# Patient Record
Sex: FEMALE | Race: WHITE | NOT HISPANIC OR LATINO | Employment: OTHER | URBAN - METROPOLITAN AREA
[De-identification: names, ages, dates, MRNs, and addresses within clinical notes are randomized per-mention and may not be internally consistent; named-entity substitution may affect disease eponyms.]

---

## 2017-01-20 ENCOUNTER — ALLSCRIPTS OFFICE VISIT (OUTPATIENT)
Dept: OTHER | Facility: OTHER | Age: 67
End: 2017-01-20

## 2017-01-20 ENCOUNTER — LAB REQUISITION (OUTPATIENT)
Dept: LAB | Facility: HOSPITAL | Age: 67
End: 2017-01-20
Payer: COMMERCIAL

## 2017-01-20 DIAGNOSIS — Z01.419 ENCOUNTER FOR GYNECOLOGICAL EXAMINATION WITHOUT ABNORMAL FINDING: ICD-10-CM

## 2017-01-20 PROCEDURE — G0145 SCR C/V CYTO,THINLAYER,RESCR: HCPCS | Performed by: OBSTETRICS & GYNECOLOGY

## 2017-01-25 LAB
LAB AP GYN PRIMARY INTERPRETATION: NORMAL
Lab: NORMAL

## 2017-01-27 ENCOUNTER — HOSPITAL ENCOUNTER (OUTPATIENT)
Dept: MAMMOGRAPHY | Facility: MEDICAL CENTER | Age: 67
Discharge: HOME | End: 2017-01-27
Payer: COMMERCIAL

## 2017-01-27 DIAGNOSIS — Z12.31 ENCOUNTER FOR SCREENING MAMMOGRAM FOR MALIGNANT NEOPLASM OF BREAST: ICD-10-CM

## 2017-01-27 PROCEDURE — G0202 SCR MAMMO BI INCL CAD: HCPCS

## 2017-02-23 ENCOUNTER — ALLSCRIPTS OFFICE VISIT (OUTPATIENT)
Dept: OTHER | Facility: OTHER | Age: 67
End: 2017-02-23

## 2017-05-26 ENCOUNTER — GENERIC CONVERSION - ENCOUNTER (OUTPATIENT)
Dept: OTHER | Facility: OTHER | Age: 67
End: 2017-05-26

## 2017-06-05 ENCOUNTER — GENERIC CONVERSION - ENCOUNTER (OUTPATIENT)
Dept: OTHER | Facility: OTHER | Age: 67
End: 2017-06-05

## 2017-07-24 ENCOUNTER — GENERIC CONVERSION - ENCOUNTER (OUTPATIENT)
Dept: OTHER | Facility: OTHER | Age: 67
End: 2017-07-24

## 2017-08-23 ENCOUNTER — ALLSCRIPTS OFFICE VISIT (OUTPATIENT)
Dept: OTHER | Facility: OTHER | Age: 67
End: 2017-08-23

## 2017-08-28 ENCOUNTER — GENERIC CONVERSION - ENCOUNTER (OUTPATIENT)
Dept: OTHER | Facility: OTHER | Age: 67
End: 2017-08-28

## 2017-11-14 DIAGNOSIS — E78.5 HYPERLIPIDEMIA: ICD-10-CM

## 2017-11-14 DIAGNOSIS — R41.3 OTHER AMNESIA: ICD-10-CM

## 2017-11-14 DIAGNOSIS — R53.82 CHRONIC FATIGUE: ICD-10-CM

## 2017-11-14 DIAGNOSIS — M79.7 FIBROMYALGIA: ICD-10-CM

## 2017-11-14 DIAGNOSIS — N18.30 CHRONIC KIDNEY DISEASE, STAGE III (MODERATE) (HCC): ICD-10-CM

## 2017-11-14 DIAGNOSIS — M81.0 AGE-RELATED OSTEOPOROSIS WITHOUT CURRENT PATHOLOGICAL FRACTURE: ICD-10-CM

## 2017-11-14 DIAGNOSIS — G31.84 MILD COGNITIVE IMPAIRMENT: ICD-10-CM

## 2017-11-29 ENCOUNTER — LAB CONVERSION - ENCOUNTER (OUTPATIENT)
Dept: OTHER | Facility: OTHER | Age: 67
End: 2017-11-29

## 2017-11-29 LAB
MISCELLANEOUS LAB TEST RESULT (HISTORICAL): 0.1
TEST NAME (HISTORICAL): NORMAL

## 2017-12-18 ENCOUNTER — GENERIC CONVERSION - ENCOUNTER (OUTPATIENT)
Dept: FAMILY MEDICINE CLINIC | Facility: CLINIC | Age: 67
End: 2017-12-18

## 2017-12-18 ENCOUNTER — LAB CONVERSION - ENCOUNTER (OUTPATIENT)
Dept: OTHER | Facility: OTHER | Age: 67
End: 2017-12-18

## 2017-12-18 LAB
25(OH)D3 SERPL-MCNC: 28 NG/ML (ref 30–100)
A/G RATIO (HISTORICAL): 1.5 (CALC) (ref 1–2.5)
ALBUMIN SERPL BCP-MCNC: 4.2 G/DL (ref 3.6–5.1)
ALP SERPL-CCNC: 65 U/L (ref 33–130)
ALT SERPL W P-5'-P-CCNC: 14 U/L (ref 6–29)
AST SERPL W P-5'-P-CCNC: 24 U/L (ref 10–35)
BILIRUB SERPL-MCNC: 0.4 MG/DL (ref 0.2–1.2)
BUN SERPL-MCNC: 14 MG/DL (ref 7–25)
BUN/CREA RATIO (HISTORICAL): 12 (CALC) (ref 6–22)
CALCIUM SERPL-MCNC: 9.4 MG/DL (ref 8.6–10.4)
CHLORIDE SERPL-SCNC: 103 MMOL/L (ref 98–110)
CHOLEST SERPL-MCNC: 292 MG/DL
CHOLEST/HDLC SERPL: 3.8 (CALC)
CO2 SERPL-SCNC: 29 MMOL/L (ref 20–31)
CREAT SERPL-MCNC: 1.13 MG/DL (ref 0.5–0.99)
EGFR AFRICAN AMERICAN (HISTORICAL): 58 ML/MIN/1.73M2
EGFR-AMERICAN CALC (HISTORICAL): 50 ML/MIN/1.73M2
ERYTHROCYTE SEDIMENTATION RATE (HISTORICAL): 38 MM/H
GAMMA GLOBULIN (HISTORICAL): 2.8 G/DL (CALC) (ref 1.9–3.7)
GLUCOSE (HISTORICAL): 84 MG/DL (ref 65–99)
HDLC SERPL-MCNC: 76 MG/DL
LDL CHOLESTEROL (HISTORICAL): 183 MG/DL (CALC)
NON-HDL-CHOL (CHOL-HDL) (HISTORICAL): 216 MG/DL (CALC)
POTASSIUM SERPL-SCNC: 4.1 MMOL/L (ref 3.5–5.3)
SODIUM SERPL-SCNC: 141 MMOL/L (ref 135–146)
TOTAL PROTEIN (HISTORICAL): 7 G/DL (ref 6.1–8.1)
TRIGL SERPL-MCNC: 178 MG/DL
TSH SERPL DL<=0.05 MIU/L-ACNC: 4.88 MIU/L (ref 0.4–4.5)
VIT B12 SERPL-MCNC: 948 PG/ML (ref 200–1100)

## 2017-12-20 ENCOUNTER — GENERIC CONVERSION - ENCOUNTER (OUTPATIENT)
Dept: FAMILY MEDICINE CLINIC | Facility: CLINIC | Age: 67
End: 2017-12-20

## 2018-01-04 ENCOUNTER — TRANSCRIBE ORDERS (OUTPATIENT)
Dept: ADMINISTRATIVE | Facility: HOSPITAL | Age: 68
End: 2018-01-04

## 2018-01-04 DIAGNOSIS — M76.30 ILIOTIBIAL BAND SYNDROME: ICD-10-CM

## 2018-01-04 DIAGNOSIS — Z12.31 ENCOUNTER FOR SCREENING MAMMOGRAM FOR MALIGNANT NEOPLASM OF BREAST: ICD-10-CM

## 2018-01-04 DIAGNOSIS — Z12.31 VISIT FOR SCREENING MAMMOGRAM: Primary | ICD-10-CM

## 2018-01-09 NOTE — PROGRESS NOTES
Chief Complaint  Pt here today for Prolia injection, administered SQ right arm  patient also mentioned her horse bit her in the right forearm, revealed a small open area with bruising  After speaking with Dr Peri Calvertie was also administered IM to the left deltoid  Active Problems    1  Acute gastritis (535 00) (K29 00)   2  Chronic fatigue (780 79) (R53 82)   3  Chronic kidney disease, stage III (moderate) (585 3) (N18 3)   4  Depression (311) (F32 9)   5  Elevated troponin level (790 6) (R79 89)   6  Encounter for cervical Pap smear with pelvic exam (V76 2,V72 31) (Z01 419)   7  Encounter for routine gynecological examination with Papanicolaou smear of cervix   (V72 31,V76 2) (Z01 419)   8  Encounter for screening colonoscopy (V76 51) (Z12 11)   9  Encounter for screening mammogram for malignant neoplasm of breast (V76 12)   (Z12 31)   10  Fibromyalgia (729 1) (M79 7)   11  Generalized anxiety disorder (300 02) (F41 1)   12  Hyperlipidemia (272 4) (E78 5)   13  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)   14  Left otitis externa (380 10) (H60 92)   15  Neoplasm of skin, benign (216 9) (D23 9)   16  Non-cardiac chest pain (786 59) (R07 89)   17  Osteopenia (733 90) (M85 80)   18  Osteoporosis (733 00) (M81 0)   19  Renal function test abnormal (794 4) (R94 4)   20  Somatic dysfunction of cervical region (739 1) (M99 01)   21  Somatic dysfunction of lumbar region (739 3) (M99 03)   22  Somatic dysfunction of thoracic region (739 2) (M99 02)   23  Transient global amnesia (437 7) (G45 4)   24  Unsatisfactory cervical Papanicolaou smear (795 08) (R87 615)    Current Meds   1  CholestOff 450 MG Oral Tablet; TAKE AS DIRECTED PER PACKAGE INSTRUCTIONS; Therapy: 66EBJ1660 to (Last Rx:35Ujs0869) Ordered   2  Fish Oil 1000 MG Oral Capsule; Therapy: 74PPP4815 to Recorded   3  Glucosamine Chondroitin Plus Oral Capsule; Therapy: 88JXU4051 to Recorded   4  Green Tea 150 MG Oral Capsule;    Therapy: 51XJH1163 to Recorded   5  LORazepam 1 MG Oral Tablet; TAKE ONE TABLET DAILY AS DIRECTED AND   NEEDED; Last VA:70NLB9735 Ordered   6  Multi Complete Oral Capsule; Therapy: 48LZD8941 to Recorded   7  Prolia 60 MG/ML Subcutaneous Solution; INJECT SUBCUTANEOUSLY  60 MG / 1 ML   EVERY 6 MONTHS; Therapy: 53QZJ5225 to (Last Rx:69Fpr0348) Ordered   8  RaNITidine HCl - 150 MG Oral Tablet; take one tablet by mouth twice daily; Therapy: 20Sgz0560 to (Evaluate:12Jun2015)  Requested for: 04Klb5033; Last   Rx:66Wkz3613 Ordered   9  Venlafaxine HCl ER 75 MG Oral Capsule Extended Release 24 Hour; TAKE 1 CAPSULE   BY MOUTH 3 TIMES A DAY; Therapy: 05KKD6180 to (Evaluate:04Maz2786)  Requested for: 83Bqy3063; Last   Rx:63Usq6585 Ordered    Allergies    1   No Known Drug Allergies    Plan  Bite wound of forearm, right, initial encounter    · Adacel 5-2-15 5 LF-MCG/0 5 Intramuscular Suspension  Osteoporosis    · Prolia 60 MG/ML Subcutaneous Solution    Future Appointments    Date/Time Provider Specialty Site   01/25/2018 02:20 PM Memo Sesay DO [facility]     Signatures   Electronically signed by : Jaqueline Mitchell DO; Feb 23 2088  5:15PM EST                       (Author)

## 2018-01-10 NOTE — PROGRESS NOTES
Assessment    1  Encounter for preventive health examination (V70 0) (Z00 00)   2  Osteopenia (733 90) (M85 80)    Plan  Encounter for cervical Pap smear with pelvic exam    · Follow-up visit in 1 year Evaluation and Treatment  Follow-up  Status: Hold For -  Scheduling  Requested for: 97QTK0538   Ordered; For: Encounter for cervical Pap smear with pelvic exam; Ordered By: Marshall Mccauley Performed:  Due: 58CPK6475  Encounter for screening mammogram for malignant neoplasm of breast    · Digital Bilateral Screening Mammogram With CAD; Status:Active; Requested  for:94Twn1022;    Perform:[facility]; AIA:21KTX7594; Last Updated By:Kailee Mcrae; 1/13/2016 5:05:08 PM;Ordered;  For:Encounter for screening mammogram for malignant neoplasm of breast; Ordered By:Christina Long;  Osteoporosis    · * DXA BONE DENSITY SPINE HIP AND PELVIS; Status:Hold For -  Scheduling,Retrospective By Protocol Authorization; Requested YQF:[id number];    Perform:[facility]; SWC:74VYO0068; Last Updated By:Kailee Mcrae; 1/15/2016 2:46:52 PM;Ordered;  For:Osteoporosis; Ordered By:Max Gonsales;    Discussion/Summary    Discussed potential gyn emergencies;will heed pap, mammo and DEXA; Rxs given; seen with p a  student, [address] [place], [place] [address]  Chief Complaint  The patient presents for her routine exam       Review of Systems    Constitutional: No fever, no chills, feels well, no tiredness, no recent weight gain or loss  ENT: no ear ache, no loss of hearing, no nosebleeds or nasal discharge, no sore throat or hoarseness  Cardiovascular: no complaints of slow or fast heart rate, no chest pain, no palpitations, no leg claudication or lower extremity edema  Respiratory: no complaints of shortness of breath, no wheezing, no dyspnea on exertion, no orthopnea or PND  Breasts: no complaints of breast pain, breast lump or nipple discharge     Gastrointestinal: no complaints of abdominal pain, no constipation, no nausea or diarrhea, no vomiting, no bloody stools  Genitourinary: no complaints of dysuria, no incontinence, no pelvic pain, no dysmenorrhea, no vaginal discharge or abnormal vaginal bleeding  Musculoskeletal: no complaints of arthralgia, no myalgia, no joint swelling or stiffness, no limb pain or swelling  Integumentary: no complaints of skin rash or lesion, no itching or dry skin, no skin wounds  Neurological: no complaints of headache, no confusion, no numbness or tingling, no dizziness or fainting  Active Problems    1  Acute gastritis (535 00) (K29 00)   2  Depression (311) (F32 9)   3  Elevated troponin level (790 6) (R79 89)   4  Encounter for screening colonoscopy (V76 51) (Z12 11)   5  Encounter for screening mammogram for malignant neoplasm of breast (V76 12)   (Z12 31)   6  Fibromyalgia (729 1) (M79 7)   7  Generalized anxiety disorder (300 02) (F41 1)   8  Hyperlipidemia (272 4) (E78 5)   9  Left otitis externa (380 10) (H60 92)   10  Neoplasm of skin, benign (216 9) (D23 9)   11  Non-cardiac chest pain (786 59) (R07 89)   12  Osteopenia (733 90) (M85 80)   13  Osteoporosis (733 00) (M81 0)   14  Somatic dysfunction of cervical region (739 1) (M99 01)   15  Somatic dysfunction of lumbar region (739 3) (M99 03)   16  Somatic dysfunction of thoracic region (739 2) (M99 02)   17  Transient global amnesia (437 7) (G45 4)   18   Unsatisfactory cervical Papanicolaou smear (795 08) (R87 615)    Past Medical History    · History of A Fall (E888 9)   · History of Abdominal pain, epigastric (789 06) (R10 13)   · History of Encounter for routine gynecological examination (V72 31) (Z01 419)   · History of Foot Pain (Soft Tissue) (729 5)   · History of Hip pain, unspecified laterality   · History of acute gastritis (V12 70) (Z87 19)   · History of Impacted cerumen of left ear (380 4) (H61 22)   · History of Malignant neoplasm without specification of site (199 1) (C80 1)   · History of Need for pneumococcal vaccination (V03 82) (Z23)   · History of Pain in hand (729 5) (M79 643)   · History of Routine Gynecological Exam With Cervical Pap Smear (V72 31)   · History of Thrombophlebitis Of Deep Vessels Of The Lower Extremity (V12 51)    Surgical History    · History of Appendiceal Laparoscopy   · History of Biopsy Oropharynx Tonsillar Fossa   · History of Chemotherapy Administration (V58 11)   · History of Chemotherapy Administration (V58 11)   · History of Diagnostic Cystoscopy   · History of Percutaneous Gastrojejunostomy   · History of Percutaneous Gastrojejunostomy   · History of Reconstruction Of Inferior Vena Cava    Family History    · Family history of Malignant neoplasm of breast, unspecified laterality    · Family history of Bone Cancer (170 9)   · Family history of Malignant neoplasm of breast, unspecified laterality    Social History    · Currently working   · Former smoker (R99 94) (X32 372)   · Quit 2004   · Single    Current Meds   1  Alpha-Lipoic Acid 200 MG Oral Capsule; Therapy: 38HCF0971 to Recorded   2  CholestOff 450 MG Oral Tablet; TAKE AS DIRECTED PER PACKAGE INSTRUCTIONS; Therapy: 68ASI1949 to (Last Rx:57Tsv3648) Ordered   3  Fish Oil 1000 MG Oral Capsule; Therapy: 90LAP6395 to Recorded   4  Glucosamine Chondroitin Plus Oral Capsule; Therapy: 38ZPZ6591 to Recorded   5  Green Tea 150 MG Oral Capsule; Therapy: 45URP4128 to Recorded   6  LORazepam 1 MG Oral Tablet; TAKE ONE TABLET DAILY AS DIRECTED AND   NEEDED; Last Rx:97Syc8612 Ordered   7  Multi Complete Oral Capsule; Therapy: 81ZJV1516 to Recorded   8  Prolia 60 MG/ML Subcutaneous Solution; INJECT SUBCUTANEOUSLY  60 MG / 1 ML   EVERY 6 MONTHS; Therapy: 91VTM5946 to (Last Rx:01Soc9791) Ordered   9  Ranitidine HCl - 150 MG Oral Tablet; take one tablet by mouth twice daily; Therapy: 02Nme8374 to (Evaluate:12Jun2015)  Requested for: 13Apr2015; Last   Rx:13Apr2015 Ordered   10   Venlafaxine HCl ER 37 5 MG Oral Capsule Extended Release 24 Hour; TAKE THREE    CAPSULES IN [facility] AND THREE CAPSULES IN THE EVENING; Therapy: 57EUN3429 to (Ane Resides)  Requested for: 63JMU7031; Last    Rx:73Brf1001 Ordered    Allergies    1  No Known Drug Allergies    Vitals   Recorded: 70TEN7627 73:74ZM   Systolic 548   Diastolic 80   Height 5 ft 3 8 in   Weight 141 lb    BMI Calculated 24 35   BSA Calculated 1 68   LMP 1997     Physical Exam    Constitutional   General appearance: No acute distress, well appearing and well nourished  Neck   Neck: Normal, supple, trachea midline, no masses  Thyroid: Normal, no thyromegaly  Pulmonary   Respiratory effort: No increased work of breathing or signs of respiratory distress  Auscultation of lungs: Clear to auscultation  Cardiovascular   Auscultation of heart: Normal rate and rhythm, normal S1 and S2, no murmurs  Peripheral vascular exam: Normal pulses Throughout  Genitourinary   External genitalia: Normal and no lesions appreciated  Vagina: Normal, no lesions or dryness appreciated  Urethra: Normal     Urethral meatus: Normal     Bladder: Normal, soft, non-tender and no prolapse or masses appreciated  Cervix: Normal, no palpable masses  ec and c pap done  Uterus: Normal, non-tender, not enlarged, and no palpable masses  Adnexa/parametria: Normal, non-tender and no fullness or masses appreciated  Anus, perineum, and rectum: Normal sphincter tone, no masses, and no prolapse  Chest   Breasts: Normal and no dimpling or skin changes noted  Abdomen   Abdomen: Normal, non-tender, and no organomegaly noted  Liver and spleen: No hepatomegaly or splenomegaly  Examination for hernias: No hernias appreciated  Stool sample for occult blood: Negative  Lymphatic   Palpation of lymph nodes in neck, axillae, groin and/or other locations: No lymphadenopathy or masses noted  Skin   Skin and subcutaneous tissue: Normal skin turgor and no rashes      Palpation of skin and subcutaneous tissue: Normal     Psychiatric   Orientation to person, place, and time: Normal     Mood and affect: Normal        Signatures   Electronically signed by : Kesah Dey DO; Serjio 15 2016  3:01PM EST                       (Author)

## 2018-01-12 ENCOUNTER — ALLSCRIPTS OFFICE VISIT (OUTPATIENT)
Dept: OTHER | Facility: OTHER | Age: 68
End: 2018-01-12

## 2018-01-12 NOTE — PROGRESS NOTES
Chief Complaint  Pt is here for a prolia inj  Active Problems    1  Acute gastritis (535 00) (K29 00)   2  Chronic fatigue (780 79) (R53 82)   3  Depression (311) (F32 9)   4  Elevated troponin level (790 6) (R79 89)   5  Encounter for cervical Pap smear with pelvic exam (V76 2,V72 31) (Z01 419)   6  Encounter for routine gynecological examination with Papanicolaou smear of cervix   (V72 31,V76 2) (Z01 419)   7  Encounter for screening colonoscopy (V76 51) (Z12 11)   8  Encounter for screening mammogram for malignant neoplasm of breast (V76 12)   (Z12 31)   9  Fibromyalgia (729 1) (M79 7)   10  Generalized anxiety disorder (300 02) (F41 1)   11  Hyperlipidemia (272 4) (E78 5)   12  Left otitis externa (380 10) (H60 92)   13  Neoplasm of skin, benign (216 9) (D23 9)   14  Non-cardiac chest pain (786 59) (R07 89)   15  Osteopenia (733 90) (M85 80)   16  Osteoporosis (733 00) (M81 0)   17  Somatic dysfunction of cervical region (739 1) (M99 01)   18  Somatic dysfunction of lumbar region (739 3) (M99 03)   19  Somatic dysfunction of thoracic region (739 2) (M99 02)   20  Transient global amnesia (437 7) (G45 4)   21  Unsatisfactory cervical Papanicolaou smear (795 08) (R87 615)    Current Meds   1  Alpha-Lipoic Acid 200 MG Oral Capsule; Therapy: 60CRK8609 to Recorded   2  CholestOff 450 MG Oral Tablet; TAKE AS DIRECTED PER PACKAGE INSTRUCTIONS; Therapy: 79MEW3260 to (Last Rx:48Fej1140) Ordered   3  Fish Oil 1000 MG Oral Capsule; Therapy: 18LAU0174 to Recorded   4  Glucosamine Chondroitin Plus Oral Capsule; Therapy: 16ATW5745 to Recorded   5  Green Tea 150 MG Oral Capsule; Therapy: 63RGO9246 to Recorded   6  LORazepam 1 MG Oral Tablet; TAKE ONE TABLET DAILY AS DIRECTED AND   NEEDED; Last Rx:30Jun2016 Ordered   7  Multi Complete Oral Capsule; Therapy: 38RED3559 to Recorded   8  Prolia 60 MG/ML Subcutaneous Solution; INJECT SUBCUTANEOUSLY  60 MG / 1 ML   EVERY 6 MONTHS;    Therapy: 63BXA0666 to (Last Rx: 15ZHR2092) Ordered   9  Ranitidine HCl - 150 MG Oral Tablet; take one tablet by mouth twice daily; Therapy: 61Tuc6407 to (Evaluate:12Jun2015)  Requested for: 13Apr2015; Last   Rx:99Hhq6396 Ordered   10  Venlafaxine HCl ER 75 MG Oral Capsule Extended Release 24 Hour; TAKE 1 CAPSULE    3 times daily; Therapy: 74DAP2501 to (Evaluate:27Oct2016)  Requested for: 31Xgd1753; Last    Rx:53Utb1570 Ordered    Allergies    1  No Known Drug Allergies    Assessment    1  Chronic fatigue (780 79) (R53 82)    Plan  Acute gastritis, Chronic fatigue    · (1) ALLERGY, FOOD PANEL; Status:Active;  Requested for:07Xgg2062;     Future Appointments    Date/Time Provider Specialty Site   01/20/2017 03:00 PM Marshall Mccauley DO Obstetrics/Gynecology [place] [facility] [place]     Signatures   Electronically signed by : Jeffrey Tirado DO; Aug  2 7322  1:21PM EST                       (Author)

## 2018-01-13 NOTE — PROGRESS NOTES
Assessment   1  Hyperlipidemia (272 4) (E78 5)   2  Hypothyroidism, adult (244 9) (E03 9)   3  Vitamin D deficiency, unspecified (268 9) (E55 9)    Plan   Hypothyroidism, adult    · (1) FREE T3; Status:Active; Requested FTD:44CIJ0717;    · (1) T4, FREE; Status:Active; Requested OJW:17PRC8146;    · (1) TSH; Status:Active; Requested NAM:33OIY2979;     Chief Complaint   Pt here for review blood work  Active Problems   1  Chronic fatigue (780 79) (R53 82)   2  Depression (311) (F32 9)   3  Encounter for routine gynecological examination with Papanicolaou smear of cervix     (V72 31,V76 2) (Z01 419)   4  Encounter for screening colonoscopy (V76 51) (Z12 11)   5  Encounter for screening mammogram for malignant neoplasm of breast (V76 12)     (Z12 31)   6  Fibromyalgia (729 1) (M79 7)   7  Generalized anxiety disorder (300 02) (F41 1)   8  Hyperlipidemia (272 4) (E78 5)   9  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)   10  Mild cognitive impairment (331 83) (G31 84)   11  Osteoporosis (733 00) (M81 0)    Past Medical History   1  History of A Fall (E888 9)   2  History of Abdominal pain, epigastric (789 06) (R10 13)   3  History of Encounter for routine gynecological examination (V72 31) (Z01 419)   4  History of Foot Pain (Soft Tissue) (729 5)   5  History of Hip pain, unspecified laterality   6  History of acute gastritis (V12 70) (Z87 19)   7  History of Impacted cerumen of left ear (380 4) (H61 22)   8  History of Malignant neoplasm without specification of site (199 1) (C80 1)   9  History of Need for pneumococcal vaccination (V03 82) (Z23)   10  History of Pain in hand (729 5) (M79 643)   11  History of Routine Gynecological Exam With Cervical Pap Smear (V72 31)   12  History of Thrombophlebitis Of Deep Vessels Of The Lower Extremity (V12 51)     The active problems and past medical history were reviewed and updated today  Surgical History   1  History of Appendiceal Laparoscopy   2   History of Biopsy Oropharynx Tonsillar Fossa   3  History of Chemotherapy Administration (V58 11)   4  History of Chemotherapy Administration (V58 11)   5  History of Diagnostic Cystoscopy   6  History of Percutaneous Gastrojejunostomy   7  History of Percutaneous Gastrojejunostomy   8  History of Reconstruction Of Inferior Vena Cava     The surgical history was reviewed and updated today  Family History   Mother    1  Family history of Malignant neoplasm of breast, unspecified laterality  Family History    2  Family history of Bone Cancer (170 9)   3  Family history of Malignant neoplasm of breast, unspecified laterality     The family history was reviewed and updated today  Social History    · Currently working   · Former smoker (P32 99) (Z91 200)   · Single  The social history was reviewed and updated today  Current Meds    1  Fish Oil 1000 MG Oral Capsule; Therapy: 29NTE0544 to Recorded   2  Glucosamine Chondroitin Plus Oral Capsule; Therapy: 12QUH3031 to Recorded   3  Green Tea 150 MG Oral Capsule; Therapy: 88ZOS4877 to Recorded   4  LORazepam 1 MG Oral Tablet; TAKE ONE TABLET DAILY AS DIRECTED AND NEEDED;     Last Rx:42Mmh5266 Ordered   5  Multi Complete Oral Capsule; Therapy: 58OCG7508 to Recorded   6  Prolia 60 MG/ML Subcutaneous Solution; INJECT SUBCUTANEOUSLY  60 MG / 1 ML     EVERY 6 MONTHS; Therapy: 54VFJ8860 to (Last Rx:54Skq7246) Ordered   7  Venlafaxine HCl ER 75 MG Oral Capsule Extended Release 24 Hour; TAKE 1 CAPSULE     BY MOUTH 3 TIMES A DAY; Therapy: 04JBG9987 to (Miah Rideau)  Requested for: 27Vot6065; Last     Rx:69Mqr2461 Ordered     The medication list was reviewed and updated today  Allergies   1   No Known Drug Allergies    Vitals   Vital Signs    Recorded: 12Jan2018 03:51PM Recorded: 13CER6604 07:63GQ   Systolic 673    Diastolic 72    Height  5 ft 3 8 in   Weight  154 lb 2 oz   BMI Calculated  26 62   BSA Calculated  1 75     Results/Data   COLONOSCOPY 65TQQ0002 12: 24QB [place]      Test Name Result Flag Reference   Colonoscopy 12/17/2017        (1) LIPID PANEL, FASTING 93OWM9173 56:80WN [place]      Test Name Result Flag Reference   CHOLESTEROL, TOTAL 292 mg/dL H <200   HDL CHOLESTEROL 76 mg/dL  >33   TRIGLICERIDES 862 mg/dL H <150   LDL-CHOLESTEROL 183 mg/dL (calc) H    Reference range: <100           Desirable range <100 mg/dL for patients with CHD or     diabetes and <70 mg/dL for diabetic patients with     known heart disease  LDL-C is now calculated using the Toni-Maynard      calculation, which is a validated novel method providing      better accuracy than the Friedewald equation in the      estimation of LDL-C  Danny Milton  Peri Guerrero  2300;551(83): 6514-3996      (http://EverCloud/faq/XUT880)   CHOL/HDLC RATIO 3 8 (calc)  <5 0   NON HDL CHOLESTEROL 216 mg/dL (calc) H <130   For patients with diabetes plus 1 major ASCVD risk      factor, treating to a non-HDL-C goal of <100 mg/dL      (LDL-C of <70 mg/dL) is considered a therapeutic      option  (1) COMPREHENSIVE METABOLIC PANEL 47CSI4050 83:04XF [place]      Test Name Result Flag Reference   GLUCOSE 84 mg/dL  65-99   Fasting reference interval   UREA NITROGEN (BUN) 14 mg/dL  7-25   CREATININE 1 13 mg/dL H 0 50-0 99   For patients >52years of age, the reference limit     for Creatinine is approximately 13% higher for people     identified as -American  eGFR NON-AFR   AMERICAN 50 mL/min/1 73m2 L > OR = 60   eGFR AFRICAN AMERICAN 58 mL/min/1 73m2 L > OR = 60   BUN/CREATININE RATIO 12 (calc)  6-22   SODIUM 141 mmol/L  135-146   POTASSIUM 4 1 mmol/L  3 5-5 3   CHLORIDE 103 mmol/L     CARBON DIOXIDE 29 mmol/L  20-31   CALCIUM 9 4 mg/dL  8 6-10 4   PROTEIN, TOTAL 7 0 g/dL  6 1-8 1   ALBUMIN 4 2 g/dL  3 6-5 1   GLOBULIN 2 8 g/dL (calc)  1 9-3 7   ALBUMIN/GLOBULIN RATIO 1 5 (calc)  1 0-2 5   BILIRUBIN, TOTAL 0 4 mg/dL  0 2-1 2   ALKALINE PHOSPHATASE 65 U/L     AST 24 U/L  10-35   ALT 14 U/L  6-29      (Q) SED RATE BY MODIFIED WESTERGREN 55YQB2706 96:57YH Colin Boudreaux      Test Name Result Flag Reference   SED RATE BY MODIFIED$WESTERGREN 38 mm/h H < OR = 30      (Q) TSH, 3RD GENERATION 67GVD4389 08:09QJ Colin Boudreaux      Test Name Result Flag Reference   TSH 4 88 mIU/L H 0 40-4 50      (1) VITAMIN B12 80RXK1458 80:63TX Colin Boudreaux      Test Name Result Flag Reference   VITAMIN B12 948 pg/mL  200-1100      *(Q) VITAMIN D, 25-HYDROXY, LC/MS/MS 87OQO6560 09:42PV Colin Boudreaux   REPORT COMMENT:     FASTING:YES      Test Name Result Flag Reference   VITAMIN D, 25-OH, TOTAL 28 ng/mL L    Vitamin D Status         25-OH Vitamin D:           Deficiency:                    <20 ng/mL     Insufficiency:             20 - 29 ng/mL     Optimal:                 > or = 30 ng/mL           For 25-OH Vitamin D testing on patients on      D2-supplementation and patients for whom quantitation      of D2 and D3 fractions is required, the QuestAssureD(TM)     25-OH VIT D, (D2,D3), LC/MS/MS is recommended: order      code 29373 (patients >2yrs)  For more information on this test, go to:     http://ReCoTech/faq/KLP466     (This link is being provided for      informational/educational purposes only )      [address] [employer] 46MBX3393 01:99QY Colin Boudreaux      Test Name Result Flag Reference   TEST NAME:      F2-Isoprostanes Urine   RESULT: 0 1 ng/mg Cr  <1 0      Signatures    Electronically signed by : Blaze Montoya DO; Jan 12 7501  3:52PM EST                       (Author)

## 2018-01-14 VITALS
DIASTOLIC BLOOD PRESSURE: 70 MMHG | SYSTOLIC BLOOD PRESSURE: 120 MMHG | WEIGHT: 148.56 LBS | HEIGHT: 64 IN | BODY MASS INDEX: 25.36 KG/M2

## 2018-01-16 NOTE — PROGRESS NOTES
Chief Complaint  Pt presented for Prolia inj; administered without incident and tolerated well  ksd,cma      Active Problems    1  Acute gastritis (535 00) (K29 00)   2  Bite wound of forearm, right, initial encounter (881 00) (S51 851A)   3  Chronic fatigue (780 79) (R53 82)   4  Chronic kidney disease, stage III (moderate) (585 3) (N18 3)   5  Depression (311) (F32 9)   6  Elevated troponin level (790 6) (R74 8)   7  Encounter for cervical Pap smear with pelvic exam (V76 2,V72 31) (Z01 419)   8  Encounter for routine gynecological examination with Papanicolaou smear of cervix   (V72 31,V76 2) (Z01 419)   9  Encounter for screening colonoscopy (V76 51) (Z12 11)   10  Encounter for screening mammogram for malignant neoplasm of breast (V76 12)    (Z12 31)   11  Fibromyalgia (729 1) (M79 7)   12  Generalized anxiety disorder (300 02) (F41 1)   13  Hyperlipidemia (272 4) (E78 5)   14  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)   15  Left otitis externa (380 10) (H60 92)   16  Neoplasm of skin, benign (216 9) (D23 9)   17  Non-cardiac chest pain (786 59) (R07 89)   18  Osteopenia (733 90) (M85 80)   19  Osteoporosis (733 00) (M81 0)   20  Renal function test abnormal (794 4) (R94 4)   21  Somatic dysfunction of cervical region (739 1) (M99 01)   22  Somatic dysfunction of lumbar region (739 3) (M99 03)   23  Somatic dysfunction of thoracic region (739 2) (M99 02)   24  Transient global amnesia (437 7) (G45 4)   25  Unsatisfactory cervical Papanicolaou smear (795 08) (R87 615)    Current Meds   1  CholestOff 450 MG Oral Tablet; TAKE AS DIRECTED PER PACKAGE INSTRUCTIONS; Therapy: 20AHP8355 to (Last Rx:53Zdt1208) Ordered   2  Fish Oil 1000 MG Oral Capsule; Therapy: 71HDJ7189 to Recorded   3  Glucosamine Chondroitin Plus Oral Capsule; Therapy: 27BWG3824 to Recorded   4  Green Tea 150 MG Oral Capsule; Therapy: 12HUB7583 to Recorded   5   LORazepam 1 MG Oral Tablet; TAKE ONE TABLET DAILY AS DIRECTED AND   NEEDED; Last Rx: [id number] Ordered   6  Multi Complete Oral Capsule; Therapy: 11AKT3724 to Recorded   7  Prolia 60 MG/ML Subcutaneous Solution; INJECT SUBCUTANEOUSLY  60 MG / 1 ML   EVERY 6 MONTHS; Therapy: 00CYE8241 to (Last Rx:96Dcm8244) Ordered   8  RaNITidine HCl - 150 MG Oral Tablet; take one tablet by mouth twice daily; Therapy: 00Wvc2440 to (Evaluate:12Jun2015)  Requested for: 72Mkq1397; Last   Rx:13Apr2015 Ordered   9  Venlafaxine HCl ER 75 MG Oral Capsule Extended Release 24 Hour; TAKE 1 CAPSULE   BY MOUTH 3 TIMES A DAY; Therapy: 61KTM8233 to (Evaluate:01Uoz3868)  Requested for: 76YNF1373; Last   Rx:[id number] Ordered    Allergies    1   No Known Drug Allergies    Plan  Osteopenia, Osteoporosis    · Prolia 60 MG/ML Subcutaneous Solution    Future Appointments    Date/Time Provider Specialty Site   01/25/2018 02:20 PM Jimmy Ramos DO Obstetrics/Gynecology [facility]     Signatures   Electronically signed by : Johnathan Johnson DO; Aug 29 0564  8:23PM EST                       (Author)

## 2018-01-17 ENCOUNTER — LAB CONVERSION - ENCOUNTER (OUTPATIENT)
Dept: OTHER | Facility: OTHER | Age: 68
End: 2018-01-17

## 2018-01-17 LAB
HEPATITIS C ANTIBODY (HISTORICAL): NORMAL
SIGNAL TO CUT-OFF (HISTORICAL): 0.01
T3FREE SERPL-MCNC: 2.4 PG/ML (ref 2.3–4.2)
T4 FREE SERPL-MCNC: 1.2 NG/DL (ref 0.8–1.8)
TSH SERPL DL<=0.05 MIU/L-ACNC: 3.02 MIU/L (ref 0.4–4.5)

## 2018-01-23 VITALS
BODY MASS INDEX: 26.31 KG/M2 | DIASTOLIC BLOOD PRESSURE: 72 MMHG | SYSTOLIC BLOOD PRESSURE: 118 MMHG | WEIGHT: 154.13 LBS | HEIGHT: 64 IN

## 2018-01-25 ENCOUNTER — OFFICE VISIT (OUTPATIENT)
Dept: OBGYN CLINIC | Facility: MEDICAL CENTER | Age: 68
End: 2018-01-25
Payer: COMMERCIAL

## 2018-01-25 VITALS
HEIGHT: 64 IN | DIASTOLIC BLOOD PRESSURE: 80 MMHG | BODY MASS INDEX: 27.02 KG/M2 | SYSTOLIC BLOOD PRESSURE: 130 MMHG | WEIGHT: 158.3 LBS

## 2018-01-25 DIAGNOSIS — Z00.00 WELCOME TO MEDICARE PREVENTIVE VISIT: Primary | ICD-10-CM

## 2018-01-25 PROCEDURE — S0612 ANNUAL GYNECOLOGICAL EXAMINA: HCPCS | Performed by: OBSTETRICS & GYNECOLOGY

## 2018-01-25 RX ORDER — VENLAFAXINE HYDROCHLORIDE 75 MG/1
CAPSULE, EXTENDED RELEASE ORAL
COMMUNITY
Start: 2012-02-15 | End: 2018-07-17 | Stop reason: SDUPTHER

## 2018-01-25 RX ORDER — GINKGO BILOBA LEAF EXTRACT 60 MG
CAPSULE ORAL
COMMUNITY
Start: 2014-10-27 | End: 2018-12-03

## 2018-01-25 RX ORDER — LORAZEPAM 1 MG/1
TABLET ORAL
COMMUNITY
End: 2018-07-17 | Stop reason: SDUPTHER

## 2018-01-25 RX ORDER — CHLORAL HYDRATE 500 MG
CAPSULE ORAL
COMMUNITY
Start: 2014-10-27 | End: 2018-12-03

## 2018-01-25 NOTE — PATIENT INSTRUCTIONS
upon p e  NO lesions noted in perirectal area, external genitalia, vagina, or cervix; bimanual wnl; breast and axillary area WNL; Rx for next screening mammogram was given by Dr Phyllis Modi; I asked pt   To have pathology repokrt from colonoscopy sent to myself; all questions addressed and appeared to be answered to her satisfaction; RTO  2 years or prn

## 2018-01-25 NOTE — PROGRESS NOTES
Pt  Here for yearly gyn preventive and wishes to discuss recent findings of HPV lesions during a colonoscopy by Abihlash Streeter; she admits to no coitus for the past 30 years, but has has cold sores and the vaccine for shingles; has never had abnormal paps and does not have pathology report from Dr Josue Odonnell; pt   Requests I examine for any lesions in the genital area

## 2018-01-26 ENCOUNTER — TELEPHONE (OUTPATIENT)
Dept: OBGYN CLINIC | Facility: MEDICAL CENTER | Age: 68
End: 2018-01-26

## 2018-01-26 NOTE — TELEPHONE ENCOUNTER
Pt  Called about report from colonoscopy, had to leave message; if queries, return call to my cell: [phone]

## 2018-01-29 ENCOUNTER — HOSPITAL ENCOUNTER (OUTPATIENT)
Dept: MAMMOGRAPHY | Facility: MEDICAL CENTER | Age: 68
Discharge: HOME | End: 2018-01-29
Payer: COMMERCIAL

## 2018-01-29 DIAGNOSIS — Z12.31 ENCOUNTER FOR SCREENING MAMMOGRAM FOR MALIGNANT NEOPLASM OF BREAST: ICD-10-CM

## 2018-01-29 PROCEDURE — 77067 SCR MAMMO BI INCL CAD: CPT

## 2018-03-15 ENCOUNTER — OFFICE VISIT (OUTPATIENT)
Dept: FAMILY MEDICINE CLINIC | Facility: CLINIC | Age: 68
End: 2018-03-15
Payer: COMMERCIAL

## 2018-03-15 DIAGNOSIS — E03.9 HYPOTHYROIDISM, ADULT: ICD-10-CM

## 2018-03-15 DIAGNOSIS — Z00.00 HEALTH CARE MAINTENANCE: Primary | ICD-10-CM

## 2018-03-15 PROBLEM — C09.9 TONSIL CANCER (HCC): Status: ACTIVE | Noted: 2018-03-15

## 2018-03-15 PROBLEM — G31.84 MILD COGNITIVE IMPAIRMENT: Status: ACTIVE | Noted: 2017-12-08

## 2018-03-15 PROBLEM — E55.9 VITAMIN D DEFICIENCY: Status: ACTIVE | Noted: 2018-01-12

## 2018-03-15 PROCEDURE — G0439 PPPS, SUBSEQ VISIT: HCPCS | Performed by: FAMILY MEDICINE

## 2018-03-15 RX ORDER — LIOTHYRONINE SODIUM 5 UG/1
5 TABLET ORAL DAILY
Qty: 30 TABLET | Refills: 3 | Status: SHIPPED | OUTPATIENT
Start: 2018-03-15 | End: 2018-03-15 | Stop reason: CLARIF

## 2018-03-15 NOTE — PROGRESS NOTES
HPI:  Nicholas Jang is a 79 y o  female here for her Subsequent Wellness Visit  Visit also until discussing her borderline abnormal thyroid results  TSH slightly improved but free T3 is on the borderline low side  Her preference is to keep close follow-up with another lab protocol in 3 months including TSH, free T 3 and free T4    Patient Active Problem List   Diagnosis    Chronic fatigue    Depression    Fibromyalgia    Generalized anxiety disorder    Hyperlipidemia    Hypothyroidism, adult    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 )    Mild cognitive impairment    Osteoporosis    [facility] ([email or web address] )    Vitamin D deficiency     Past Medical History:   Diagnosis Date    Fall     last assessed - 14Nov2012    Impacted cerumen of left ear     last assessed - 01VMD5227    Malignant neoplasm without specification of site [facility]) 06/2004    Phlebitis and thrombophlebitis of deep vessels of lower extremities, unspecified laterality ([email or web address] )      Past Surgical History:   Procedure Laterality Date    CYSTOSCOPY      Diagnostic    GASTROJEJUNOSTOMY      Percutaneous    LAPAROSCOPIC APPENDECTOMY      OTHER SURGICAL HISTORY      1)Biopsy oropharynx tonsillar fossa; 2)Chemotherapy Administration with chemoport for tonsillar cancer    VENA CAVA RECONSTRUCTION      Inferior vena cava     Family History   Problem Relation Age of Onset    Breast cancer Mother      unspecified laterality        Bone cancer Family     Breast cancer Family      unspecified laterality     History   Smoking Status    Former Smoker    Quit date: 2004   Smokeless Tobacco    Never Used     History   Alcohol Use No      History   Drug Use No     /72   Ht 5' 4" (1 626 m)   Wt 74 8 kg (165 lb)   BMI 28 32 kg/m²       Current Outpatient Prescriptions   Medication Sig Dispense Refill    denosumab (PROLIA) 60 mg/mL Inject under the skin every 6 (six) months      Green Tea 150 MG CAPS Take by mouth      LORazepam (ATIVAN) 1 mg tablet Take by mouth      Misc Natural Products (GLUCOSAMINE CHONDROITIN ADV PO) Take by mouth      Multiple Vitamins-Minerals (MULTI COMPLETE PO) Take by mouth      Omega-3 Fatty Acids (FISH OIL) 1,000 mg Take by mouth      venlafaxine (EFFEXOR-XR) 75 mg 24 hr capsule Take by mouth       No current facility-administered medications for this visit        Allergies   Allergen Reactions    Morphine Other (See Comments)     Immunization History   Administered Date(s) Administered    Pneumococcal Polysaccharide PPV23 10/01/2004, 12/04/2014    Tdap 02/23/2017    Zoster 11/19/2014       Patient Care Team:  Sol Valle DO as PCP - General    Medicare Screening Tests and Risk Assessments:  [facility]     ISAR:   Previous hospitalizations?:  No       Once in a Lifetime Medicare Screening:       Medicare Screening Tests and Risk Assessment:   AAA Risk Assessment    Osteoporosis Risk Assessment    HIV Risk Assessment        Drug and Alcohol Use:   Tobacco use    Cigarettes:  former smoker    Quit date:  1/1/00   Smokeless:  never used smokeless tobacco    Tobacco use duration    Tobacco Cessation Readiness    Alcohol use    Alcohol use:  never    Alcohol Treatment Readiness   Illicit Drug Use    Drug use:  never        Diet & Exercise:   Diet   What is your diet?:  Frequent junk food, Regular   How many servings a day of the following:   Fruits and Vegetables:  3-4 Meat:  0   Whole Grains:  1 Simple Carbs:  2, 3   Dairy:  3 Soda:  0   Coffee:  0 Tea:  1, 2   Exercise    Do you currently exercise?:  yes    Frequency:  daily    Minutes per day:  45   Times per week:  7     Type of exercise:  walking, weights       Cognitive Impairment Screening:   Cognitive Impairment Screening    Do you have difficulty learning or retaining new information?:  Yes Do you have difficulty handling new tasks?:  No   Do you have difficulty with reasoning?:  No Do you have difficulty with spatial ability and orientation?:  No   Do you have difficulty with language?:  No Do you have difficulty with behavior?:  No       Functional Ability/Level of Safety:   Hearing    Hearing difficulties:  Yes Bilateral:  slightly decreased   Left:  slightly decreased Right:  slightly decreased   Hearing aid:  No    Hearing Impairment Assessment    Hearing status:  Hearing loss in left ear, Hearing loss in right ear   Do your family members ever complain that you turn on the radio or T V  too loudly?:  No Do you find that other people have to repeat themselves when talking to you?:  Yes   Do you have difficulty hearing while talking on the phone?:  Yes Has anyone ever told you that you are speaking too loudly when talking with them?:  No   Do you have trouble hearing the doorbell or phone ringing?:  No Do you have difficulty hearing such that you feel frustrated talking to people?:  No   Do you feel sad because you cannot hear well?:  No Do you feel inconvienced due to your hearing problem?:  No    Would you be willing to go for a hearing aid fitting if suggested?:  Yes   Current Activities    Help needed with the folllowing:    Using the phone:  No Transportation:  No   Shopping:  No Preparing Meals:  No   Doing Housework:  No Doing Laundry:  No   Managing Medications:  No Managing Money:  No   ADL    Feeding:  Independant   Oral hygiene and Facial grooming:  Independant   Bathing:  Independant   Upper Body Dressing:  Independant   Lower Body Dressing:  Independant   Toileting:  Independant   Bed Mobility:  Independant   Fall Risk   Have you fallen in the last 12 months?:  No Are you unsteady on your feet?:  Yes    Are you taking any medications that may cause fatigue or dizziness?:  No   Do you have any chronic conditions that may contribute to a fall?:  Arthritis, Stroke Do you rush to the bathroom potentially risking a fall?:  No   Injury History       Home Safety:   Are there hazards in your environment?:  No   If you fell, would you need help to get back up from the ground?:  Yes Do you have problems or concerns getting in/out of a bed, chair, tub, or toilet?:  No   Do you feel unsteady when walking?:  Yes Is your activity limited by pain?:  No   Do you have handrails and grab-bars in the home?:  Yes Are emergency numbers kept by the phone and regularly updated?:  Yes   Are you and/or family members aware of the dangers of smoking in bed?:  Yes Are firearms stored securely?:  Yes   Do you have working smoke alarms and fire extinguisher?:  Yes Do all household members know how to use them?:  Yes   Have you left the stove on unsupervised?:  No    Home Safety Risk Factors   Unfamilar with surroundings:  No Uneven floors:  Yes   Stairs or handrail saftey risk:  Yes Loose rugs:  No   Household clutter:  Yes Poor household lighting:  No   No grab bars in bathroom:  No Further evaluation needed:  No       Advanced Directives:   Advanced Directives    Living Will:  No    Advanced directive:  No    Patient's End of Life Decisions        Urinary Incontinence:   Do you have urinary incontinence?:  No Do you have incomplete emptying?:  Yes   Do you urinate frequently?:  No Do you have urinary urgency?:  Yes   Do you have urinary hesitancy?:  No Do you have dysuria (painful and/or difficult urination)?:  No   Do you have nocturia (waking up to urinate)?:  Yes Do you strain when urinating (have to push to urinate)?:  No   Do you have a weak stream when urinating?:  No Do you have intermittent streaming when urinating?:  No   Do you dribble urine after finishing?:  No    Do you have vaginal pressure?:  No Do you have vaginal dryness?:  No       Glaucoma:            Provider Screening    No data filed           Vitals:    03/15/18 1502 03/15/18 1540   BP:  128/72   Weight: 74 8 kg (165 lb)    Height: 5' 4" (1 626 m)    Body mass index is 28 32 kg/m²  Physical Exam :  Physical Exam   Constitutional: She is oriented to person, place, and time  She appears well-developed and well-nourished  Pleasant 80-year-old white female who appears slightly younger than her stated age  Her BMI is slightly out of range   HENT:   Head: Normocephalic and atraumatic  Eyes: EOM are normal  Pupils are equal, round, and reactive to light  Neck: Normal range of motion  Cardiovascular: Normal rate and regular rhythm  Pulmonary/Chest: Effort normal and breath sounds normal    Abdominal: Soft  Bowel sounds are normal    Musculoskeletal: Normal range of motion  Neurological: She is alert and oriented to person, place, and time  Skin: Skin is warm and dry  Psychiatric: She has a normal mood and affect  Her behavior is normal  Judgment and thought content normal        Reviewed Updated [facility] Prior Wellness Visits:   Last Medicare wellness visit information was reviewed, patient interviewed , no change since last AWVyes  Last Medicare wellness visit information was reviewed, patient interviewed and updates made to the record today yes    Assessment and Plan:  1  Health care maintenance     2   Hypothyroidism, adult  T4, free    T3, free    TSH, 3rd generation    DISCONTINUED: liothyronine (CYTOMEL) 5 mcg tablet       Health Maintenance Due   Topic Date Due    Depression Screening PHQ-9  10/13/1962    Fall Risk  10/13/2015    Urinary Incontinence Screening  10/13/2015    GLAUCOMA SCREENING 67+ YR  10/13/2017

## 2018-03-18 VITALS
SYSTOLIC BLOOD PRESSURE: 128 MMHG | BODY MASS INDEX: 28.17 KG/M2 | HEIGHT: 64 IN | DIASTOLIC BLOOD PRESSURE: 72 MMHG | WEIGHT: 165 LBS

## 2018-03-23 ENCOUNTER — TELEPHONE (OUTPATIENT)
Dept: FAMILY MEDICINE CLINIC | Facility: CLINIC | Age: 68
End: 2018-03-23

## 2018-03-23 ENCOUNTER — CLINICAL SUPPORT (OUTPATIENT)
Dept: FAMILY MEDICINE CLINIC | Facility: CLINIC | Age: 68
End: 2018-03-23
Payer: COMMERCIAL

## 2018-03-23 DIAGNOSIS — M81.0 OSTEOPOROSIS, UNSPECIFIED OSTEOPOROSIS TYPE, UNSPECIFIED PATHOLOGICAL FRACTURE PRESENCE: Primary | ICD-10-CM

## 2018-03-23 PROCEDURE — 96372 THER/PROPH/DIAG INJ SC/IM: CPT | Performed by: FAMILY MEDICINE

## 2018-03-23 NOTE — TELEPHONE ENCOUNTER
Pt presented for nurse visit for her prolia injection; med pended to you, please send back when signed  Thank You

## 2018-04-06 ENCOUNTER — TELEPHONE (OUTPATIENT)
Dept: OBGYN CLINIC | Facility: MEDICAL CENTER | Age: 68
End: 2018-04-06

## 2018-04-06 ENCOUNTER — TELEPHONE (OUTPATIENT)
Dept: FAMILY MEDICINE CLINIC | Facility: CLINIC | Age: 68
End: 2018-04-06

## 2018-04-06 NOTE — TELEPHONE ENCOUNTER
Patient called and stated you approved her to get a diagnostic pap in our office due to recently diagnosed with HPV   I wanted to make sure this was correct and where you would like me to schedule

## 2018-04-06 NOTE — TELEPHONE ENCOUNTER
The patient called late Friday morning questions whether or not she should be getting a pap due to her cancer history and the possible issue with the HPV  She stated that Dr Katie Ricks said that she should still get them and she was just wondering  She also noted that since she was here already for her yearly appt this year would you prefer her to go to Dr Katie Ricks to have it done or would you rather her make an appt to be seen her with you

## 2018-04-09 NOTE — TELEPHONE ENCOUNTER
lmom for pt to call back  Temporarily put pt in on 05/15/2018   Waiting to hear back if she wants that appt date

## 2018-05-14 ENCOUNTER — TELEPHONE (OUTPATIENT)
Dept: FAMILY MEDICINE CLINIC | Facility: CLINIC | Age: 68
End: 2018-05-14

## 2018-05-14 NOTE — TELEPHONE ENCOUNTER
Patient called and wanted to give you a little FYI before her appt tomorrow  States she can acidic foods and today she ate a pineapple and afterwards for about 1 5 hours everything had an acidy taste  States even her tongue was bleeding  Wanted to know if you wanted to do any research about this before her appt tomorrow

## 2018-05-15 ENCOUNTER — ANNUAL EXAM (OUTPATIENT)
Dept: FAMILY MEDICINE CLINIC | Facility: CLINIC | Age: 68
End: 2018-05-15
Payer: COMMERCIAL

## 2018-05-15 VITALS — HEIGHT: 64 IN | WEIGHT: 168.4 LBS | BODY MASS INDEX: 28.75 KG/M2

## 2018-05-15 DIAGNOSIS — K62.9 ANAL LESION: ICD-10-CM

## 2018-05-15 DIAGNOSIS — B97.7 HPV IN FEMALE: Primary | ICD-10-CM

## 2018-05-15 DIAGNOSIS — E03.9 HYPOTHYROIDISM, ADULT: ICD-10-CM
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
## 2018-05-15 PROBLEM — A63.0 VENEREAL WART: Status: ACTIVE | Noted: 2018-03-26

## 2018-05-15 PROCEDURE — 3725F SCREEN DEPRESSION PERFORMED: CPT | Performed by: FAMILY MEDICINE

## 2018-05-15 PROCEDURE — 99214 OFFICE O/P EST MOD 30 MIN: CPT | Performed by: FAMILY MEDICINE

## 2018-05-15 NOTE — PROGRESS NOTES
Assessment/Plan:      Diagnoses and all orders for this visit:    HPV in female  -     HPV, High Risk, Hybrid Capture II W/Refl 16,18; Future    Anal lesion  -     HPV, High Risk, Hybrid Capture II W/Refl 16,18; Future    Hypothyroidism, adult  Comments: Follow-up lab work pending    Accident due to mechanical fall without injury, initial encounter  Comments:    Minor bruises and contusions improved  Patient will follow up with regards to results of the HPV testing on cervix  With regards to other issues noted in the HPI instructions have been given  Would avoid   pineapple, as I believe this was a allergic reaction  Patient is spreading herself too thin with her multiple tasks ensures and she will "triage" to try to eliminate stress  Follow up with regards to results of thyroid  Will forward appropriate results to Dr William Myers  patient was in the office for 30+ minutes with greater than 50% counseling performed  Subjective:   Chief Complaint   Patient presents with    Gynecologic Exam      Patient ID: Bhanu Rai is a 79 y o  female  Patient is a 44-year-old pleasant but anxious female who is here for confirmation all HPV thin prep Pap  She has a history of an anal lesion which was biopsied and ultimately  Destroyed with fulguration by Dr Tracie Green on April 11  Patient has not been sexually active in decades  She has no current partner  She has no known history of positive HPV on Pap tests  She has been having them done regularly  Patient also presents with questionable reaction to eating pineapple yesterday and her tongue became swollen and actually was bleeding but a bit  She had no respiratory distress but maybe felt a little bit swollen in the back of her tongue  also  She has had pineapple before this is the 1st time this type of reaction has occurred  It is better today    Patient also relates she had a fall a couple weeks ago on an uneven surface she tried to stop herself with her left hand  She did not hit her head full on but bumped the left forehead a bit  She screw to knees a little bit  She was not dizzy or lightheaded afterwards she had no nausea  She had some bruises and they have faded appropriately  She still has some slight tenderness in the 5th digit left hand  There is no deformity noted  Patient has lots of stressors at home with multiple tasks that need to be done with regards to her house in her garden and her fence  She feels a bit overwhelmed  She has decided she is going to increase her Effexor to 2 in the morning and 1 in the evening  She is going to take lorazepam dose every morning preventively  She also complains that despite the fact that she is trying to keep her core exercises up-to-date she still feels a lot of soreness  However on the other hand she does state that she is doing a lot of physical activities  She is going to be following up with chiropractor and will be having her therapeutic massage this upcoming week  There was also a history of borderline hypothyroidism  TSH is going to be repeated in June but since patient is having some increased discomfort will do the thyroid studies sooner  Gynecologic Exam   The patient's pertinent negatives include no genital odor or pelvic pain  Primary symptoms comment:  anal rectal lesion positive for HPV type changes  Patient is here for HPV directed thin prep to rule out presence of HPV in cervix    Associated symptoms include back pain  Pertinent negatives include no headaches or vomiting  Fall   The accident occurred more than 1 week ago  Fall occurred: Walking and tripped on uneven surface  She fell from a height of 1 to 2 ft  She landed on concrete  There was no blood loss  The point of impact was the face and left wrist  Pain location: No residual symptoms with regards to the head  Patient have some slight discomfort in the hand  The pain is at a severity of 1/10   Pertinent negatives include no headaches or vomiting  The following portions of the patient's history were reviewed and updated as appropriate: allergies, current medications, past family history, past medical history, past social history, past surgical history and problem list       Review of Systems   Constitutional:        Overwhelmed stressed   HENT: Negative for rhinorrhea  Eyes: Negative for visual disturbance  Respiratory: Negative for cough, shortness of breath and wheezing  Cardiovascular: Negative for chest pain  Gastrointestinal: Negative  Negative for vomiting  Genitourinary: Negative for pelvic pain and vaginal bleeding  As noted with regards to HPV history   Musculoskeletal: Positive for arthralgias and back pain  Neurological: Negative for headaches  Hematological: Does not bruise/bleed easily  Psychiatric/Behavioral:        Stressed anxious overwhelmed         Objective:  Ht 5' 4" (1 626 m)   Wt 76 4 kg (168 lb 6 4 oz)   BMI 28 91 kg/m²        Physical Exam   Constitutional: She is oriented to person, place, and time  She appears well-developed and well-nourished  Pleasant 66-year-old female who appears younger than her stated age with BMI of 28%   HENT:   Head: Normocephalic and atraumatic  Eyes: EOM are normal  Pupils are equal, round, and reactive to light  No nystagmus   Neck: Normal range of motion  Neck supple  Cardiovascular: Normal rate, regular rhythm and intact distal pulses  Pulmonary/Chest: Effort normal and breath sounds normal    Abdominal: Soft  Genitourinary: Vagina normal    Genitourinary Comments:  Cervix appears completely normal no lesions no cervical motion tenderness  Specimen obtained for HPV testing   Musculoskeletal:   Slight increased thoracic kyphosis   Neurological: She is alert and oriented to person, place, and time  Psychiatric: Judgment and thought content normal  Her mood appears anxious   Cognition and memory are normal  Concerned somewhat expansive and slightly hyper affect

## 2018-05-18 LAB
HPV I/H RISK 1 DNA CVX QL PROBE+SIG AMP: NORMAL
HPV I/H RISK 1 DNA CVX QL PROBE+SIG AMP: NOT DETECTED

## 2018-05-21 NOTE — PROGRESS NOTES
HPV testing is negative    Asked the patient if she wants us to forward this result to Dr Alice Deras , her  Colorectal specialist

## 2018-05-30 LAB
T3FREE SERPL-MCNC: 2.6 PG/ML (ref 2.3–4.2)
T4 FREE SERPL-MCNC: 1.1 NG/DL (ref 0.8–1.8)
TSH SERPL-ACNC: 2.25 MIU/L (ref 0.4–4.5)

## 2018-06-05 ENCOUNTER — OFFICE VISIT (OUTPATIENT)
Dept: FAMILY MEDICINE CLINIC | Facility: CLINIC | Age: 68
End: 2018-06-05
Payer: COMMERCIAL

## 2018-06-05 VITALS — HEIGHT: 64 IN | BODY MASS INDEX: 28.82 KG/M2 | WEIGHT: 168.8 LBS

## 2018-06-05 DIAGNOSIS — E03.9 HYPOTHYROIDISM, ADULT: ICD-10-CM

## 2018-06-05 DIAGNOSIS — Z12.2 ENCOUNTER FOR SCREENING FOR LUNG CANCER: ICD-10-CM

## 2018-06-05 DIAGNOSIS — K62.9 ANAL LESION: Primary | ICD-10-CM

## 2018-06-05 PROCEDURE — 99214 OFFICE O/P EST MOD 30 MIN: CPT | Performed by: FAMILY MEDICINE

## 2018-06-05 PROCEDURE — 3008F BODY MASS INDEX DOCD: CPT | Performed by: FAMILY MEDICINE

## 2018-06-05 RX ORDER — BIOTIN 1 MG
2000 TABLET ORAL DAILY
COMMUNITY
End: 2018-12-03

## 2018-06-05 NOTE — PROGRESS NOTES
Assessment/Plan:     Diagnoses and all orders for this visit:    Anal lesion  Comments:   condyloma acuminata AIN 1    Hypothyroidism, adult  Comments: Follow-up TSH, free T and free T4 in 6 months    Encounter for screening for lung cancer  -     CT lung screening program; Future    Other orders  -     Cholecalciferol (VITAMIN D3) 1000 units CAPS; Take 2,000 capsules by mouth daily         Discussed at length results of her anal excision biopsy as well as the negative HPV recent thin prep Pap  Appropriate surveillance is recommended with colorectal specialist to rule out any recurrence of the anal wart  Appropriate follow-up with all her other preventative health care measures  Was discussed  Total time spent in the office with consultation was 30+ minutes with greater than 50% counseling performed  Subjective:   Chief Complaint   Patient presents with    Results     discuss test results in chart      Patient ID: Qiana Pastrana is a 79 y o  female  This is a pleasant 70-year-old female who is here for follow-up on some recent testing  She sees Dr Chris Devine over the years for colonoscopy in follow-up on an anal lesion  This had characteristics of HPV related changes  Most recent diagnosis in the 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  Because of the concerns of HPV and other related areas a recent thin prep with HPV was done and that was negative  Patient has had normal Pap smears for many years  She has a history of tonsillar cancer dating back to 2004  Old records were pulled from those biopsy reports and there is no particular mention of HPV related changes  Patient was treated says fully with radiation and chemotherapy at that time  She had follow-up  With Oncology and has been deemed cancer free times many years  try to reassure the patient that regular follow-up with Dr Elida Beltre with regards to rechecking for recurrence of the anal lesion is appropriate    Also she will keep her regular gyn check with HPV screening yearly  Discussed the fact HPV indeed is a risk factor for squamous cancers in those areas where exposure may have taken place  Retrospectively there is no way we can re-evaluate those tissue samples from 2004 on her tonsillar cancer diagnosis  Initial tissue samples were done by ENT here locally and her definitive tonsillar surgery was done at [facility]  Warm partly patient does continue to be compliant with mammogram follow-up  We did discuss lung cancer screening with CT lung protocol and patient took that under advisement as she has previous smoking history  Thyroid Problem   Presents for follow-up (Patient has had fluctuating TSH level  Was in the subclinical range in now the numbers have corrected themselves ) visit  The symptoms have been stable (Will keep eye on numbers every 6 months)  The following portions of the patient's history were reviewed and updated as appropriate: allergies, current medications, past family history, past medical history, past social history, past surgical history and problem list       Review of Systems   Constitutional: Negative  HENT: Positive for postnasal drip  History of Zenker's diverticulum noted on  Esophagram August of 2017 after evaluation by ear nose and throat  Respiratory: Negative for choking  Cardiovascular: Negative for chest pain  Gastrointestinal: Negative  Genitourinary: Negative  Musculoskeletal: Positive for arthralgias  Psychiatric/Behavioral:         anxiety level is fairly stable         Objective:  Ht 5' 4" (1 626 m)   Wt 76 6 kg (168 lb 12 8 oz)   BMI 28 97 kg/m²        Physical Exam   Constitutional: She is oriented to person, place, and time  She appears well-developed and well-nourished  HENT:   Head: Normocephalic and atraumatic  Mouth/Throat: No oropharyngeal exudate  Neck: Normal range of motion     Cardiovascular: Normal rate and intact distal pulses  Pulmonary/Chest: Effort normal    Abdominal: Soft  Musculoskeletal: Normal range of motion  Lymphadenopathy:     She has no cervical adenopathy  Neurological: She is alert and oriented to person, place, and time  Psychiatric: She has a normal mood and affect   Her behavior is normal  Judgment and thought content normal

## 2018-07-17 DIAGNOSIS — F32.A DEPRESSION, UNSPECIFIED DEPRESSION TYPE: Primary | ICD-10-CM

## 2018-07-17 DIAGNOSIS — F41.1 GENERALIZED ANXIETY DISORDER: ICD-10-CM

## 2018-07-17 RX ORDER — VENLAFAXINE HYDROCHLORIDE 75 MG/1
75 CAPSULE, EXTENDED RELEASE ORAL 3 TIMES DAILY
Qty: 90 CAPSULE | Refills: 3 | Status: SHIPPED | OUTPATIENT
Start: 2018-07-17 | End: 2018-12-11 | Stop reason: HOSPADM

## 2018-07-17 RX ORDER — LORAZEPAM 1 MG/1
1 TABLET ORAL
Qty: 30 TABLET | Refills: 0 | Status: SHIPPED | OUTPATIENT
Start: 2018-07-17 | End: 2018-12-11 | Stop reason: HOSPADM

## 2018-09-20 ENCOUNTER — TELEPHONE (OUTPATIENT)
Dept: FAMILY MEDICINE CLINIC | Facility: CLINIC | Age: 68
End: 2018-09-20

## 2018-09-20 ENCOUNTER — TRANSCRIBE ORDERS (OUTPATIENT)
Dept: FAMILY MEDICINE CLINIC | Facility: CLINIC | Age: 68
End: 2018-09-20

## 2018-09-20 DIAGNOSIS — E55.9 VITAMIN D DEFICIENCY DISEASE: ICD-10-CM

## 2018-09-20 DIAGNOSIS — E03.8 SUBCLINICAL HYPOTHYROIDISM: Primary | ICD-10-CM

## 2018-09-20 NOTE — TELEPHONE ENCOUNTER
Patient is very tired, depressed, and her joints are achy  She is asking if she can have blood work done? TSH, T3, and T4    Please fax to [phone] on [address]    #[phone] please call patient when copmpleted

## 2018-09-22 LAB
25(OH)D3 SERPL-MCNC: 40 NG/ML (ref 30–100)
T3FREE SERPL-MCNC: 2.6 PG/ML (ref 2.3–4.2)
T4 FREE SERPL-MCNC: 1.1 NG/DL (ref 0.8–1.8)
TSH SERPL-ACNC: 2.35 MIU/L (ref 0.4–4.5)

## 2018-09-25 ENCOUNTER — OFFICE VISIT (OUTPATIENT)
Dept: FAMILY MEDICINE CLINIC | Facility: CLINIC | Age: 68
End: 2018-09-25
Payer: COMMERCIAL

## 2018-09-25 VITALS — SYSTOLIC BLOOD PRESSURE: 118 MMHG | DIASTOLIC BLOOD PRESSURE: 76 MMHG

## 2018-09-25 DIAGNOSIS — F41.1 GENERALIZED ANXIETY DISORDER: ICD-10-CM

## 2018-09-25 DIAGNOSIS — E03.9 HYPOTHYROIDISM, ADULT: ICD-10-CM

## 2018-09-25 DIAGNOSIS — M79.10 MYALGIA: ICD-10-CM

## 2018-09-25 DIAGNOSIS — M79.7 FIBROMYALGIA: ICD-10-CM

## 2018-09-25 DIAGNOSIS — R13.10 DYSPHAGIA, UNSPECIFIED TYPE: ICD-10-CM

## 2018-09-25 DIAGNOSIS — M25.50 ARTHRALGIA, UNSPECIFIED JOINT: Primary | ICD-10-CM

## 2018-09-25 PROCEDURE — 99215 OFFICE O/P EST HI 40 MIN: CPT | Performed by: NURSE PRACTITIONER

## 2018-09-25 PROCEDURE — 1036F TOBACCO NON-USER: CPT | Performed by: NURSE PRACTITIONER

## 2018-09-25 PROCEDURE — 1160F RVW MEDS BY RX/DR IN RCRD: CPT | Performed by: NURSE PRACTITIONER

## 2018-09-25 NOTE — PATIENT INSTRUCTIONS
I will talk to Dr Yousuf Hartmann this evening and we will get back to you  Schedule follow up with Dr Yousuf Hartmann

## 2018-09-25 NOTE — PROGRESS NOTES
Assessment/Plan:    Arthralgias/ Myalgias  Patient was resistant to give history of ailment to myself  She was very anxious about seeing a different provider other than Dr Rome Haddad  Patient was tearful and rude at times and other times happy and pleasant  From information I was able to obtain and reviewing previous notes patient should have work up completed for possible etiologies of arthralgias such as autoimmune causes or Lyme  Discussed plan of care with Dr Rome Haddad and agreed together on labs that would be appropriate to have done for next visit  Generalized Anxiety Disorder   Not well controlled at visit today  Patient states she felt very unprepared and wishes everything to be relayed to Dr Zeyad Elizondo and then formulate plan of care which was done  Plan of care established and patient will be informed of testing to be done prior to her next appointment with Dr Rome Haddad  Patient concerned about her cortisol levels secondary to an article that she read  Will check cortisol levels  Spent more than 60 minutes with patient and spent more than half the time counseling patient and calming her down and educating her on situation and plan  By the end of the visit patient was okay and in better spirits and understood the plan and felt less overwhelmed  Dysphagia  Patient states this was an issue for a while but seems to be worsned  Feels her neck is "tight" palpable lymph nodes that are equal on both sides and not pathologic  Patient has hx of tonsil cancer and surgery, dysphagia may be secondary to this and possible scar tissue  Recommended to patient about getting video barium swallowing study to evaluate swallowing process  Patient refuses this at this time and will discuss in the future with Dr Rome Haddad  Will also look into date due for patient's Prolia injection so she can hopefully get that completed at next visit  Patient is to call if any worsening symptoms or no improvement   We will update patient about lab work to be completed and date of next visit with Dr Beatrice Bhatti  Diagnoses and all orders for this visit:    Arthralgia, unspecified joint  -     RF Screen w/ Reflex to Titer; Future  -     IVETT Screen w/ Reflex to Titer/Pattern; Future  -     Lyme Antibody Profile with reflex to WB; Future  -     Sedimentation rate, automated; Future  -     C-reactive protein; Future  -     Cyclic citrul peptide antibody, IgG; Future    Fibromyalgia  -     RF Screen w/ Reflex to Titer; Future  -     IVETT Screen w/ Reflex to Titer/Pattern; Future  -     Lyme Antibody Profile with reflex to WB; Future  -     Sedimentation rate, automated; Future  -     C-reactive protein; Future  -     Cyclic citrul peptide antibody, IgG; Future    Myalgia  -     RF Screen w/ Reflex to Titer; Future  -     IVETT Screen w/ Reflex to Titer/Pattern; Future  -     Lyme Antibody Profile with reflex to WB; Future  -     Sedimentation rate, automated; Future  -     C-reactive protein; Future  -     Cyclic citrul peptide antibody, IgG; Future    Hypothyroidism, adult  -     Thyroid Antibodies Panel; Future    Generalized anxiety disorder  -     SALIVARY CORTISOL,ONE SPECIMEN; Future    Dysphagia, unspecified type      Patient verbalizes understand and agrees with treatment plan  Subjective:        Patient ID: Shannon Mitchell is a 79 y o  female  Chief Complaint   Patient presents with    Muscle Cramping     with joint pain; very fatigue  Patient presents to office today for muscle aches and pains  Patient states that she has had this pain for ever  Patient states that over time she has tried glucosamine and essential oils and Aleve  Patient states that there is no pattern to the pain  She states that sometimes it is there and sometimes not  Patient does spend a lot of time outside gardening  Patient denies any known history of a tick bite     Patient states that when it does occur it seems unrelated to any physical activity  Patient also has concerns thinking in this is related to thyroid but most recent thyroid testing was within normal range  Patient has concerns if this is related to heard renal glands as she has read an article about adrenal fatigue and states that she has been very stressed for a long time  Patient is very tearful and frustrated due to multiple illnesses and not feeling well  Patient also states that her neck does feel tight at times and she does have trouble swallowing at times  Patient states that she has had swallowing study in the past   Patient does have pertinent history of tonsil cancer  The following portions of the patient's history were reviewed and updated as appropriate: allergies, current medications, past family history, past social history and problem list     Review of Systems   Constitutional: Negative for chills and fever  HENT: Negative for congestion  Eyes: Negative for pain and visual disturbance  Respiratory: Negative for cough and shortness of breath  Cardiovascular: Negative for chest pain, palpitations and leg swelling  Gastrointestinal: Negative for abdominal pain, diarrhea, nausea and vomiting  Genitourinary: Negative for difficulty urinating and dysuria  Musculoskeletal: Positive for arthralgias and myalgias  Skin: Negative for color change and rash  Neurological: Negative for dizziness, syncope, numbness and headaches  Hematological: Negative for adenopathy  Psychiatric/Behavioral: Negative for agitation and behavioral problems  The patient is nervous/anxious  Objective:  /76 (BP Location: Left arm, Patient Position: Sitting, Cuff Size: Large)      Physical Exam   Constitutional: She is oriented to person, place, and time  She appears well-developed  No distress  HENT:   Head: Normocephalic and atraumatic     Right Ear: External ear normal    Left Ear: External ear normal    Nose: Nose normal  Mouth/Throat: Oropharynx is clear and moist  No oral lesions  No oropharyngeal exudate, posterior oropharyngeal edema or posterior oropharyngeal erythema  Eyes: Conjunctivae and lids are normal  Right eye exhibits no discharge  Left eye exhibits no discharge  Neck: Normal range of motion  Neck supple  No tracheal deviation present  Cardiovascular: Normal rate and regular rhythm  No murmur heard  Pulmonary/Chest: Effort normal and breath sounds normal  No respiratory distress  She has no wheezes  Abdominal: Soft  Bowel sounds are normal  She exhibits no distension  There is no tenderness  There is no guarding  Musculoskeletal: Normal range of motion  She exhibits no edema, tenderness or deformity  Lymphadenopathy:     She has no cervical adenopathy  Neurological: She is alert and oriented to person, place, and time  Coordination normal    Skin: Skin is warm and dry  No rash noted  She is not diaphoretic  No erythema  Psychiatric: Her speech is normal  Judgment and thought content normal  Her mood appears anxious  She is agitated  Cognition and memory are normal  She exhibits a depressed mood  Mood and affect inappropriate, easily happy and then sad and tearful  Very anxious  Cannot focus on questions asked  Rude at times  Irritable  Nursing note and vitals reviewed

## 2018-09-26 ENCOUNTER — TELEPHONE (OUTPATIENT)
Dept: FAMILY MEDICINE CLINIC | Facility: CLINIC | Age: 68
End: 2018-09-26

## 2018-09-26 NOTE — TELEPHONE ENCOUNTER
----- Message from [address] sent at 9/25/2018  7:28 PM EDT -----  [facility] to [facility] so they are there for patient  It's a whole bunch of labs  If she asks yes we are checking her adrenal glands  Discussed with dr Madie Sibley and we formulated plan with lab work to be done before next visit  I messaged Larrine to find when she's due for prolia so we can sync that up to next visit if possible if she's due     Dr Madie Sibley is going to look at her schedule and give date of when she come in sooner compared to date that is currently scheduled  Wait until we have date from Dr Madie Sibley before calling

## 2018-09-27 ENCOUNTER — TELEPHONE (OUTPATIENT)
Dept: FAMILY MEDICINE CLINIC | Facility: CLINIC | Age: 68
End: 2018-09-27

## 2018-09-27 NOTE — TELEPHONE ENCOUNTER
----- Message from Colleen Ravi, DO sent at 2/26/0348 12:20 PM EDT -----  Regarding: ENT  Can we contact [facility] (ENT) office and find out last visit? Notes?  I can't find in chart

## 2018-09-27 NOTE — TELEPHONE ENCOUNTER
Called ENT office and spoke with Karis Park she informed me that the patient was last seen there on 8/23/2017 and said that we should have gotten the letter from that last visit  Please advise        By Domingo Closs, MA

## 2018-09-27 NOTE — TELEPHONE ENCOUNTER
Pt aware that lab slips are at the lab and stated she wanted to keep the November appt with Dr Latha Bird   She then started to cry saying she is frustrated because she is talking to 3 different people  (me, you and dr Kaya Lara) she would like for you or dr Kaya Lara to personally call her to discuss "things "   # [phone]

## 2018-09-27 NOTE — TELEPHONE ENCOUNTER
----- Message from Domingo Tucker, DO sent at 3/99/3385 12:20 PM EDT -----  Regarding: ENT  Can we contact Greg Hendrix (ENT) office and find out last visit? Notes?  I can't find in chart

## 2018-10-11 ENCOUNTER — CLINICAL SUPPORT (OUTPATIENT)
Dept: FAMILY MEDICINE CLINIC | Facility: CLINIC | Age: 68
End: 2018-10-11
Payer: COMMERCIAL

## 2018-10-11 DIAGNOSIS — M81.0 AGE RELATED OSTEOPOROSIS, UNSPECIFIED PATHOLOGICAL FRACTURE PRESENCE: Primary | ICD-10-CM

## 2018-10-11 PROCEDURE — 90471 IMMUNIZATION ADMIN: CPT | Performed by: NURSE PRACTITIONER

## 2018-10-11 PROCEDURE — 4040F PNEUMOC VAC/ADMIN/RCVD: CPT | Performed by: NURSE PRACTITIONER

## 2018-10-16 ENCOUNTER — TELEPHONE (OUTPATIENT)
Dept: FAMILY MEDICINE CLINIC | Facility: CLINIC | Age: 68
End: 2018-10-16

## 2018-10-18 LAB
ANA SER QL IF: NEGATIVE
B BURGDOR AB SER IA-ACNC: <0.9 INDEX
CCP IGG SERPL-ACNC: <16 UNITS
CRP SERPL-MCNC: 1.1 MG/L
ERYTHROCYTE [SEDIMENTATION RATE] IN BLOOD BY WESTERGREN METHOD: 33 MM/H
FT4I SERPL CALC-MCNC: 2.5 (ref 1.4–3.8)
RHEUMATOID FACT SERPL-ACNC: <14 IU/ML
T3RU NFR SERPL: 30 % (ref 22–35)
T4 SERPL-MCNC: 8.2 MCG/DL (ref 5.1–11.9)

## 2018-10-18 NOTE — PROGRESS NOTES
Let the patient know had a time that the extensive blood work to rule out any sort of systemic rheumatism was negative  The only thing that was slightly elevated was sed rate which is a nonspecific test it was 33 with less than 30 being normal so would is very marginally elevated    This can be elevated in regular osteoarthritis or any issues with tendinitis or bursitis

## 2018-10-19 ENCOUNTER — TELEPHONE (OUTPATIENT)
Dept: FAMILY MEDICINE CLINIC | Facility: CLINIC | Age: 68
End: 2018-10-19

## 2018-10-19 NOTE — TELEPHONE ENCOUNTER
Patient called to cancel her appointment with you for November as her results from labs are negative  I tried to reschedule her for her routine appointment in about 4-6 months from now however patient would not allow me to do so

## 2018-10-23 LAB — CORTIS SAL-MCNC: NORMAL UG/DL

## 2018-12-03 ENCOUNTER — HOSPITAL ENCOUNTER (EMERGENCY)
Facility: HOSPITAL | Age: 68
End: 2018-12-04
Attending: EMERGENCY MEDICINE
Payer: COMMERCIAL

## 2018-12-03 DIAGNOSIS — F32.A DEPRESSION: Primary | ICD-10-CM

## 2018-12-03 DIAGNOSIS — Z00.8 MEDICAL CLEARANCE FOR PSYCHIATRIC ADMISSION: Primary | ICD-10-CM

## 2018-12-03 DIAGNOSIS — F41.9 ANXIETY: ICD-10-CM

## 2018-12-03 LAB
ALBUMIN SERPL BCP-MCNC: 3.9 G/DL (ref 3.5–5)
ALP SERPL-CCNC: 72 U/L (ref 46–116)
ALT SERPL W P-5'-P-CCNC: 20 U/L (ref 12–78)
AMPHETAMINES SERPL QL SCN: NEGATIVE
ANION GAP SERPL CALCULATED.3IONS-SCNC: 9 MMOL/L (ref 4–13)
AST SERPL W P-5'-P-CCNC: 20 U/L (ref 5–45)
BACTERIA UR QL AUTO: ABNORMAL /HPF
BARBITURATES UR QL: NEGATIVE
BASOPHILS # BLD AUTO: 0.04 THOUSANDS/ΜL (ref 0–0.1)
BASOPHILS NFR BLD AUTO: 1 % (ref 0–1)
BENZODIAZ UR QL: NEGATIVE
BILIRUB SERPL-MCNC: 0.51 MG/DL (ref 0.2–1)
BILIRUB UR QL STRIP: NEGATIVE
BUN SERPL-MCNC: 24 MG/DL (ref 5–25)
CALCIUM SERPL-MCNC: 9.9 MG/DL (ref 8.3–10.1)
CHLORIDE SERPL-SCNC: 99 MMOL/L (ref 100–108)
CLARITY UR: CLEAR
CO2 SERPL-SCNC: 30 MMOL/L (ref 21–32)
COCAINE UR QL: NEGATIVE
COLOR UR: YELLOW
CREAT SERPL-MCNC: 1.46 MG/DL (ref 0.6–1.3)
EOSINOPHIL # BLD AUTO: 0.05 THOUSAND/ΜL (ref 0–0.61)
EOSINOPHIL NFR BLD AUTO: 1 % (ref 0–6)
ERYTHROCYTE [DISTWIDTH] IN BLOOD BY AUTOMATED COUNT: 13.3 % (ref 11.6–15.1)
ETHANOL SERPL-MCNC: <3 MG/DL (ref 0–3)
GFR SERPL CREATININE-BSD FRML MDRD: 37 ML/MIN/1.73SQ M
GLUCOSE SERPL-MCNC: 104 MG/DL (ref 65–140)
GLUCOSE UR STRIP-MCNC: NEGATIVE MG/DL
HCT VFR BLD AUTO: 42.1 % (ref 34.8–46.1)
HGB BLD-MCNC: 13.6 G/DL (ref 11.5–15.4)
HGB UR QL STRIP.AUTO: ABNORMAL
IMM GRANULOCYTES # BLD AUTO: 0.02 THOUSAND/UL (ref 0–0.2)
IMM GRANULOCYTES NFR BLD AUTO: 0 % (ref 0–2)
KETONES UR STRIP-MCNC: NEGATIVE MG/DL
LEUKOCYTE ESTERASE UR QL STRIP: NEGATIVE
LYMPHOCYTES # BLD AUTO: 1.08 THOUSANDS/ΜL (ref 0.6–4.47)
LYMPHOCYTES NFR BLD AUTO: 21 % (ref 14–44)
MCH RBC QN AUTO: 30.3 PG (ref 26.8–34.3)
MCHC RBC AUTO-ENTMCNC: 32.3 G/DL (ref 31.4–37.4)
MCV RBC AUTO: 94 FL (ref 82–98)
METHADONE UR QL: NEGATIVE
MONOCYTES # BLD AUTO: 0.63 THOUSAND/ΜL (ref 0.17–1.22)
MONOCYTES NFR BLD AUTO: 12 % (ref 4–12)
NEUTROPHILS # BLD AUTO: 3.3 THOUSANDS/ΜL (ref 1.85–7.62)
NEUTS SEG NFR BLD AUTO: 65 % (ref 43–75)
NITRITE UR QL STRIP: NEGATIVE
NON-SQ EPI CELLS URNS QL MICRO: ABNORMAL /HPF
NRBC BLD AUTO-RTO: 0 /100 WBCS
OPIATES UR QL SCN: NEGATIVE
PCP UR QL: NEGATIVE
PH UR STRIP.AUTO: 7 [PH] (ref 4.5–8)
PLATELET # BLD AUTO: 271 THOUSANDS/UL (ref 149–390)
PMV BLD AUTO: 8.7 FL (ref 8.9–12.7)
POTASSIUM SERPL-SCNC: 4.9 MMOL/L (ref 3.5–5.3)
PROT SERPL-MCNC: 8 G/DL (ref 6.4–8.2)
PROT UR STRIP-MCNC: NEGATIVE MG/DL
RBC # BLD AUTO: 4.49 MILLION/UL (ref 3.81–5.12)
RBC #/AREA URNS AUTO: ABNORMAL /HPF
SODIUM SERPL-SCNC: 138 MMOL/L (ref 136–145)
SP GR UR STRIP.AUTO: 1.01 (ref 1–1.03)
THC UR QL: NEGATIVE
TSH SERPL DL<=0.05 MIU/L-ACNC: 3.37 UIU/ML (ref 0.36–3.74)
UROBILINOGEN UR QL STRIP.AUTO: 0.2 E.U./DL
WBC # BLD AUTO: 5.12 THOUSAND/UL (ref 4.31–10.16)
WBC #/AREA URNS AUTO: ABNORMAL /HPF

## 2018-12-03 PROCEDURE — 81001 URINALYSIS AUTO W/SCOPE: CPT | Performed by: EMERGENCY MEDICINE

## 2018-12-03 PROCEDURE — 80320 DRUG SCREEN QUANTALCOHOLS: CPT | Performed by: EMERGENCY MEDICINE

## 2018-12-03 PROCEDURE — 36415 COLL VENOUS BLD VENIPUNCTURE: CPT | Performed by: EMERGENCY MEDICINE

## 2018-12-03 PROCEDURE — 80307 DRUG TEST PRSMV CHEM ANLYZR: CPT | Performed by: EMERGENCY MEDICINE

## 2018-12-03 PROCEDURE — 93005 ELECTROCARDIOGRAM TRACING: CPT

## 2018-12-03 PROCEDURE — 99285 EMERGENCY DEPT VISIT HI MDM: CPT

## 2018-12-03 PROCEDURE — 84443 ASSAY THYROID STIM HORMONE: CPT | Performed by: EMERGENCY MEDICINE

## 2018-12-03 PROCEDURE — 80053 COMPREHEN METABOLIC PANEL: CPT | Performed by: EMERGENCY MEDICINE

## 2018-12-03 PROCEDURE — G0480 DRUG TEST DEF 1-7 CLASSES: HCPCS | Performed by: EMERGENCY MEDICINE

## 2018-12-03 PROCEDURE — 85025 COMPLETE CBC W/AUTO DIFF WBC: CPT | Performed by: EMERGENCY MEDICINE

## 2018-12-03 RX ORDER — HALOPERIDOL 2 MG/1
2 TABLET ORAL EVERY 8 HOURS PRN
Status: CANCELLED | OUTPATIENT
Start: 2018-12-03

## 2018-12-03 RX ORDER — MAGNESIUM HYDROXIDE/ALUMINUM HYDROXICE/SIMETHICONE 120; 1200; 1200 MG/30ML; MG/30ML; MG/30ML
30 SUSPENSION ORAL EVERY 4 HOURS PRN
Status: CANCELLED | OUTPATIENT
Start: 2018-12-03

## 2018-12-03 RX ORDER — HYDROXYZINE HYDROCHLORIDE 25 MG/1
25 TABLET, FILM COATED ORAL EVERY 6 HOURS PRN
Status: CANCELLED | OUTPATIENT
Start: 2018-12-03

## 2018-12-03 RX ORDER — ACETAMINOPHEN 325 MG/1
650 TABLET ORAL EVERY 6 HOURS PRN
Status: CANCELLED | OUTPATIENT
Start: 2018-12-03

## 2018-12-03 RX ORDER — ACETAMINOPHEN 325 MG/1
650 TABLET ORAL ONCE
Status: COMPLETED | OUTPATIENT
Start: 2018-12-03 | End: 2018-12-03

## 2018-12-03 RX ORDER — VENLAFAXINE HYDROCHLORIDE 75 MG/1
75 CAPSULE, EXTENDED RELEASE ORAL
COMMUNITY
End: 2018-12-11 | Stop reason: HOSPADM

## 2018-12-03 RX ORDER — LORAZEPAM 1 MG/1
1 TABLET ORAL ONCE
Status: COMPLETED | OUTPATIENT
Start: 2018-12-03 | End: 2018-12-03

## 2018-12-03 RX ORDER — BENZTROPINE MESYLATE 1 MG/1
1 TABLET ORAL EVERY 6 HOURS PRN
Status: CANCELLED | OUTPATIENT
Start: 2018-12-03

## 2018-12-03 RX ORDER — RISPERIDONE 1 MG/1
1 TABLET, ORALLY DISINTEGRATING ORAL EVERY 8 HOURS PRN
Status: CANCELLED | OUTPATIENT
Start: 2018-12-03

## 2018-12-03 RX ORDER — TRAZODONE HYDROCHLORIDE 50 MG/1
25 TABLET ORAL
Status: CANCELLED | OUTPATIENT
Start: 2018-12-03

## 2018-12-03 RX ORDER — HALOPERIDOL 5 MG/ML
2 INJECTION INTRAMUSCULAR EVERY 6 HOURS PRN
Status: CANCELLED | OUTPATIENT
Start: 2018-12-03

## 2018-12-03 RX ADMIN — ACETAMINOPHEN 650 MG: 325 TABLET, FILM COATED ORAL at 19:54

## 2018-12-03 RX ADMIN — LORAZEPAM 1 MG: 1 TABLET ORAL at 16:22

## 2018-12-03 NOTE — ED NOTES
Upon attempting to triage, assess, and do medication rec pt  Refusing to answer questions  Pt  States "I don't like to repeat myself, how many other people are going to come in and ask me the same questions " I told patient the physician and crisis worker would be seeing her next and she refused to answer any further questions of mine        Airam Moreno RN  12/03/18 [id number]

## 2018-12-03 NOTE — ED NOTES
Insurance Authorization:   Phone call placed to [employer] number: [id number]     Spoke to: Wellington Naranjo approved-3  Level of care: Inpatient treatment  Review on 12/5/18  Authorization # [id number]

## 2018-12-03 NOTE — ED PROVIDER NOTES
History  Chief Complaint   Patient presents with    Depression     Pt  arrived via EMS  Per EMS pt  came from PCP for depression x 30 years with reports of SI with a plan to cut her wrists, also reports issues at home with sister  Pt  refuses to answer any further questions at this time  75 YO female presents for evaluation of worsening depression and anxiety  States she has been dealing with depression for years, this is 2/2 having no friends despite trying  She states this time of year is worse as the holidays are approaching  She notes worsening feeling of hopelessness and states often she just doesn't feel like going on  She saw a massage therapist today and mentioned these feelings, was told to go to the ED  Pt takes Ativan and Effexor but notes she takes this intermittently  Pt has never tried to harm herself in the past; she has a therapist, has never seen a psychiatrist, stating her PCP manages her psychiatric medications  Pt admits she has problems with her temper  Pt notes she does not sleep well, she is up most of the night and sleeping until the afternoon, pt is having difficulty taking care of her ADL's  Pt denies CP/SOB/F/C/N/V/D/C, no dysuria, burning on urination or blood in urine  History provided by:  Patient   used: No    Depression   Presenting symptoms: agitation and depression    Degree of incapacity (severity): Moderate  Onset quality:  Gradual  Timing:  Constant  Progression:  Worsening  Chronicity:  Chronic  Context: noncompliance    Treatment compliance:  Some of the time  Relieved by:  Nothing  Worsened by:  Nothing  Associated symptoms: feelings of worthlessness and insomnia    Associated symptoms: no abdominal pain, no chest pain and no fatigue        Prior to Admission Medications   Prescriptions Last Dose Informant Patient Reported? Taking?    LORazepam (ATIVAN) 1 mg tablet   No Yes   Sig: Take 1 tablet (1 mg total) by mouth daily at bedtime Patient taking differently: Take 1 mg by mouth daily as needed     venlafaxine (EFFEXOR-XR) 75 mg 24 hr capsule  Self No No   Sig: Take 1 capsule (75 mg total) by mouth 3 (three) times a day   Patient taking differently: Take 150 mg by mouth every morning     venlafaxine (EFFEXOR-XR) 75 mg 24 hr capsule   Yes Yes   Sig: Take 75 mg by mouth daily after dinner        Facility-Administered Medications: None       Past Medical History:   Diagnosis Date    Fall     last assessed - 14Nov2012    Impacted cerumen of left ear     last assessed - [id number]    Malignant neoplasm without specification of site [facility]) 06/2004    Phlebitis and thrombophlebitis of deep vessels of lower extremities, unspecified laterality (HCC)        Past Surgical History:   Procedure Laterality Date    CYSTOSCOPY      Diagnostic    GASTROJEJUNOSTOMY      Percutaneous    LAPAROSCOPIC APPENDECTOMY      OTHER SURGICAL HISTORY      1)Biopsy oropharynx tonsillar fossa; 2)Chemotherapy Administration with chemoport for tonsillar cancer    TONSILLECTOMY      VENA CAVA RECONSTRUCTION      Inferior vena cava       Family History   Problem Relation Age of Onset    Breast cancer Mother         unspecified laterality        Bone cancer Family     Breast cancer Family         unspecified laterality     I have reviewed and agree with the history as documented  Social History   Substance Use Topics    Smoking status: Former Smoker     Quit date: 2000    Smokeless tobacco: Never Used    Alcohol use No        Review of Systems   Constitutional: Negative for chills, fatigue and fever  HENT: Negative for dental problem  Eyes: Negative for visual disturbance  Respiratory: Negative for shortness of breath  Cardiovascular: Negative for chest pain  Gastrointestinal: Negative for abdominal pain, diarrhea and vomiting  Genitourinary: Negative for dysuria and frequency  Musculoskeletal: Negative for arthralgias  Skin: Negative for rash  Neurological: Negative for dizziness, weakness and light-headedness  Psychiatric/Behavioral: Positive for agitation and depression  Negative for behavioral problems and confusion  The patient has insomnia  All other systems reviewed and are negative  Physical Exam  Physical Exam   Constitutional: She is oriented to person, place, and time  She appears well-developed and well-nourished  HENT:   Head: Normocephalic and atraumatic  Eyes: Pupils are equal, round, and reactive to light  EOM are normal    Neck: Normal range of motion  Cardiovascular: Normal rate, regular rhythm and normal heart sounds  Pulmonary/Chest: Effort normal and breath sounds normal    Abdominal: Soft  Musculoskeletal: Normal range of motion  Neurological: She is alert and oriented to person, place, and time  Skin: Skin is warm and dry  Psychiatric: Her behavior is normal  She exhibits a depressed mood  She expresses suicidal ideation  She expresses no homicidal ideation  Nursing note and vitals reviewed        Vital Signs  ED Triage Vitals   Temperature Pulse Respirations Blood Pressure SpO2   12/03/18 1555 12/03/18 1555 12/03/18 1555 12/03/18 1555 12/03/18 1555   98 2 °F (36 8 °C) 88 16 (!) 187/121 94 %      Temp Source Heart Rate Source Patient Position - Orthostatic VS BP Location FiO2 (%)   12/03/18 1555 12/04/18 0259 -- 12/04/18 0259 --   Oral Monitor  Left arm       Pain Score       12/03/18 1555       No Pain           Vitals:    12/03/18 1555 12/03/18 1810 12/04/18 0259   BP: (!) 187/121 151/95 116/83   Pulse: 88 84 82       Visual Acuity      ED Medications  Medications   LORazepam (ATIVAN) tablet 1 mg (1 mg Oral Given 12/3/18 1622)   acetaminophen (TYLENOL) tablet 650 mg (650 mg Oral Given 12/3/18 1954)       Diagnostic Studies  Results Reviewed     Procedure Component Value Units Date/Time    Rapid drug screen, urine [[id number]]  (Normal) Collected:  12/03/18 1648    Lab Status:  Final result Specimen: Urine from Urine, Clean Catch Updated:  12/03/18 1737     Amph/Meth UR Negative     Barbiturate Ur Negative     Benzodiazepine Urine Negative     Cocaine Urine Negative     Methadone Urine Negative     Opiate Urine Negative     PCP Ur Negative     THC Urine Negative    Narrative:         FOR MEDICAL PURPOSES ONLY  IF CONFIRMATION NEEDED PLEASE CONTACT THE LAB WITHIN 5 DAYS  Drug Screen Cutoff Levels:  AMPHETAMINE/METHAMPHETAMINES  1000 ng/mL  BARBITURATES     200 ng/mL  BENZODIAZEPINES     200 ng/mL  COCAINE      300 ng/mL  METHADONE      300 ng/mL  OPIATES      300 ng/mL  PHENCYCLIDINE     25 ng/mL  THC       50 ng/mL    Urine Microscopic [[id number]]  (Abnormal) Collected:  12/03/18 1648    Lab Status:  Final result Specimen:  Urine from Urine, Clean Catch Updated:  12/03/18 1732     RBC, UA 2-4 (A) /hpf      WBC, UA 1-2 (A) /hpf      Epithelial Cells Occasional /hpf      Bacteria, UA Occasional /hpf     Ethanol [[id number]]  (Normal) Collected:  12/03/18 1645    Lab Status:  Final result Specimen:  Blood from Arm, Left Updated:  12/03/18 1728     Ethanol Lvl <3 mg/dL     UA w Reflex to Microscopic w Reflex to Culture [[id number]]  (Abnormal) Collected:  12/03/18 1648    Lab Status:  Final result Specimen:  Urine from Urine, Clean Catch Updated:  12/03/18 1724     Color, UA Yellow     Clarity, UA Clear     Specific Gravity, UA 1 010     pH, UA 7 0     Leukocytes, UA Negative     Nitrite, UA Negative     Protein, UA Negative mg/dl      Glucose, UA Negative mg/dl      Ketones, UA Negative mg/dl      Urobilinogen, UA 0 2 E U /dl      Bilirubin, UA Negative     Blood, UA Trace-lysed (A)    TSH [[id number]]  (Normal) Collected:  12/03/18 1645    Lab Status:  Final result Specimen:  Blood from Arm, Left Updated:  12/03/18 1722     TSH 3RD GENERATON 3 367 uIU/mL     Narrative:         Patients undergoing fluorescein dye angiography may retain small amounts of fluorescein in the body for 48-72 hours post procedure  Samples containing fluorescein can produce falsely depressed TSH values  If the patient had this procedure,a specimen should be resubmitted post fluorescein clearance  The recommended reference ranges for TSH during pregnancy are as follows:  First trimester 0 1 to 2 5 uIU/mL  Second trimester  0 2 to 3 0 uIU/mL  Third trimester 0 3 to 3 0 uIU/m      Comprehensive metabolic panel [[id number]]  (Abnormal) Collected:  12/03/18 1645    Lab Status:  Final result Specimen:  Blood from Arm, Left Updated:  12/03/18 1714     Sodium 138 mmol/L      Potassium 4 9 mmol/L      Chloride 99 (L) mmol/L      CO2 30 mmol/L      ANION GAP 9 mmol/L      BUN 24 mg/dL      Creatinine 1 46 (H) mg/dL      Glucose 104 mg/dL      Calcium 9 9 mg/dL      AST 20 U/L      ALT 20 U/L      Alkaline Phosphatase 72 U/L      Total Protein 8 0 g/dL      Albumin 3 9 g/dL      Total Bilirubin 0 51 mg/dL      eGFR 37 ml/min/1 73sq m     Narrative:         National Kidney Disease Education Program recommendations are as follows:  GFR calculation is accurate only with a steady state creatinine  Chronic Kidney disease less than 60 ml/min/1 73 sq  meters  Kidney failure less than 15 ml/min/1 73 sq  meters      CBC and differential [[id number]]  (Abnormal) Collected:  12/03/18 1645    Lab Status:  Final result Specimen:  Blood from Arm, Left Updated:  12/03/18 1652     WBC 5 12 Thousand/uL      RBC 4 49 Million/uL      Hemoglobin 13 6 g/dL      Hematocrit 42 1 %      MCV 94 fL      MCH 30 3 pg      MCHC 32 3 g/dL      RDW 13 3 %      MPV 8 7 (L) fL      Platelets 329 Thousands/uL      nRBC 0 /100 WBCs      Neutrophils Relative 65 %      Immat GRANS % 0 %      Lymphocytes Relative 21 %      Monocytes Relative 12 %      Eosinophils Relative 1 %      Basophils Relative 1 %      Neutrophils Absolute 3 30 Thousands/µL      Immature Grans Absolute 0 02 Thousand/uL      Lymphocytes Absolute 1 08 Thousands/µL      Monocytes Absolute 0 63 Thousand/µL Eosinophils Absolute 0 05 Thousand/µL      Basophils Absolute 0 04 Thousands/µL                  No orders to display              Procedures  ECG 12 Lead Documentation  Date/Time: 12/3/2018 4:57 PM  Performed by: Herrera Bell by: Mansoor ENGLE     ECG reviewed by me, the ED Provider: yes    Patient location:  ED  Interpretation:     Interpretation: normal    Rate:     ECG rate:  93    ECG rate assessment: normal    Rhythm:     Rhythm: sinus rhythm    QRS:     QRS axis:  Normal    QRS intervals:  Normal  Conduction:     Conduction: normal    ST segments:     ST segments:  Normal  T waves:     T waves: normal             Phone Contacts  ED Phone Contact    ED Course  ED Course as of Dec 04 0846   Mon Dec 03, 2018   1720 Fluctuation in previous creatinine, 1 13 eleven months prior but 1 42 two years prior and 1 7 prior to this  Creatinine: (!) 1 46                               MDM  Number of Diagnoses or Management Options  Anxiety: new and requires workup  Depression: new and requires workup  Diagnosis management comments: 1  Depression - Pt states she has no social support, this is the primary etiology for her depression  States she does not have family, , children and this makes her very depressed  Worse during the holiday season  She is debilitated by this depression to the point that she is not taking care of her ADL's  Feel pt will require transfer to a psychiatric facility  Will speak with crisis         Amount and/or Complexity of Data Reviewed  Clinical lab tests: ordered and reviewed  Discuss the patient with other providers: yes  Independent visualization of images, tracings, or specimens: yes    Patient Progress  Patient progress: stable    CritCare Time    Disposition  Final diagnoses:   Depression   Anxiety     Time reflects when diagnosis was documented in both MDM as applicable and the Disposition within this note     Time User Action Codes Description Comment    12/3/2018  5:35 PM Lonwaltere He Add [F32 9] Depression     12/3/2018  5:35 PM Robe ENGLE Add [F41 9] Anxiety       ED Disposition     ED Disposition Condition Comment    Transfer to Another [place] should be transferred out to [facility] heart      MD Documentation      Most Recent Value   Patient Condition  The patient has been stabilized such that within reasonable medical probability, no material deterioration of the patient condition or the condition of the unborn child(savage) is likely to result from the transfer   Reason for Transfer  Level of Care needed not available at this facility   Benefits of Transfer  Specialized equipment and/or services available at the receiving facility (Include comment)________________________   Risks of Transfer  Potential for delay in receiving treatment, Increased discomfort during transfer   Accepting Physician  Dr Herman Molina Name, [facility]     (Name & Tel number)  [facility] [phone]   Transported by [facility] and Unit #)  Jerad [facility]   Sending MD Dr Ilya Meyer   Provider Certification  General risk, such as traffic hazards, adverse weather conditions, rough terrain or turbulence, possible failure of equipment (including vehicle or aircraft), or consequences of actions of persons outside the control of the transport personnel      RN Documentation      Most [address] Name, [facility]     (Name & Tel number)  Ric Keith   Transported by (Company and Unit #)  Jerad Cox      Follow-up Information    None         Discharge Medication List as of 12/4/2018  3:51 AM      CONTINUE these medications which have NOT CHANGED    Details   LORazepam (ATIVAN) 1 mg tablet Take 1 tablet (1 mg total) by mouth daily at bedtime, Starting Tue 7/17/2018, Normal      !! venlafaxine (EFFEXOR-XR) 75 mg 24 hr capsule Take 1 capsule (75 mg total) by mouth 3 (three) times a day, Starting Tue 7/17/2018, Normal      !! venlafaxine (EFFEXOR-XR) 75 mg 24 hr capsule Take 75 mg by mouth daily at bedtime, Historical Med       !! - Potential duplicate medications found  Please discuss with provider  No discharge procedures on file      ED Provider  Electronically Signed by           Dionte Cameron MD  12/04/18 6264

## 2018-12-03 NOTE — ED NOTES
Pt reports increased anxiety and depression  She reports she has no desire to take care of herself anymore and feels unworthy  She reports her dad would put her down when she was younger by calling her fat and lazy and her sister has been doing it to her as well  She does not have any supports and told ems she would cut herself with a  knife because she has nowhere to turn  She lives alone and the holidays are very difficult for patient  She says she doesnt take care of her home, she doesnt like to walk anymore and doesnt like to bathe  She doesnt feel the need to carry on because she doesnt have anyone and is very lonely  Pt will sign a 201 for treatment

## 2018-12-03 NOTE — LETTER
[address]  [address] [address]  Dept: [address] [facility]                                         1950                              MRN [id number]    I have been informed of my rights regarding examination, treatment, and transfer   by Dr Chepe Mackey DO    Benefits: Specialized equipment and/or services available at the receiving facility (Include comment)________________________    Risks: Potential for delay in receiving treatment, Increased discomfort during transfer      Consent for Transfer:  I acknowledge that my medical condition has been evaluated and explained to me by the emergency department physician or other qualified medical person and/or my attending physician, who has recommended that I be transferred to the service of  Accepting Physician: Dr Paula Leyva at [address] [place] [address] Name, [facility]   The above potential benefits of such transfer, the potential risks associated with such transfer, and the probable risks of not being transferred have been explained to me, and I fully understand them  The doctor has explained that, in my case, the benefits of transfer outweigh the risks  I agree to be transferred  I authorize the performance of emergency medical procedures and treatments upon me in both transit and upon arrival at the receiving facility  Additionally, I authorize the release of any and all medical records to the receiving facility and request they be transported with me, if possible  I understand that the safest mode of transportation during a medical emergency is an ambulance and that the Hospital advocates the use of this mode of transport   Risks of traveling to the receiving facility by car, including absence of medical control, life sustaining equipment, such as oxygen, and medical personnel has been explained to me and I fully understand them     ([facility])  [ X ]  I consent to the stated transfer and to be transported by ambulance/helicopter  [  ]  I consent to the stated transfer, but refuse transportation by ambulance and accept full responsibility for my transportation by car  I understand the risks of non-ambulance transfers and I exonerate the Hospital and its staff from any deterioration in my condition that results from this refusal     X___________________________________________    DATE  18  TIME________  Signature of patient or legally responsible individual signing on patient behalf           RELATIONSHIP TO PATIENT_________________________          Provider Certification    NAME Osei Martinez                                         1950                              MRN [id number]    A medical screening exam was performed on the above named patient  Based on the examination:    Condition Necessitating Transfer The primary encounter diagnosis was Depression  A diagnosis of Anxiety was also pertinent to this visit      Patient Condition: The patient has been stabilized such that within reasonable medical probability, no material deterioration of the patient condition or the condition of the unborn child(savage) is likely to result from the transfer    Reason for Transfer: Level of Care needed not available at this facility    Transfer Requirements:  [address]    · Space available and qualified personnel available for treatment as acknowledged by Joycelyn Villanueva  · Agreed to accept transfer and to provide appropriate medical treatment as acknowledged by       Dr Kenny Fernandez  · Appropriate medical records of the examination and treatment of the patient are provided at the time of transfer   [address] _______  · Transfer will be performed by qualified personnel from [facility]  and appropriate transfer equipment as required, including the use of necessary and appropriate life support measures  Provider Certification: I have examined the patient and explained the following risks and benefits of being transferred/refusing transfer to the patient/family:  General risk, such as traffic hazards, adverse weather conditions, rough terrain or turbulence, possible failure of equipment (including vehicle or aircraft), or consequences of actions of persons outside the control of the transport personnel      Based on these reasonable risks and benefits to the patient and/or the unborn child(savage), and based upon the information available at the time of the patients examination, I certify that the medical benefits reasonably to be expected from the provision of appropriate medical treatments at another medical facility outweigh the increasing risks, if any, to the individuals medical condition, and in the case of labor to the unborn child, from effecting the transfer      X____________________________________________ DATE 12/03/18        TIME_______      ORIGINAL - SEND TO MEDICAL RECORDS   COPY - SEND WITH PATIENT DURING TRANSFER

## 2018-12-04 ENCOUNTER — HOSPITAL ENCOUNTER (INPATIENT)
Facility: HOSPITAL | Age: 68
LOS: 7 days | Discharge: HOME | DRG: 885 | End: 2018-12-11
Attending: PSYCHIATRY & NEUROLOGY | Admitting: PSYCHIATRY & NEUROLOGY
Payer: COMMERCIAL

## 2018-12-04 ENCOUNTER — TELEPHONE (OUTPATIENT)
Dept: FAMILY MEDICINE CLINIC | Facility: CLINIC | Age: 68
End: 2018-12-04

## 2018-12-04 VITALS
TEMPERATURE: 98.2 F | HEART RATE: 82 BPM | OXYGEN SATURATION: 97 % | DIASTOLIC BLOOD PRESSURE: 83 MMHG | SYSTOLIC BLOOD PRESSURE: 116 MMHG | BODY MASS INDEX: 27.12 KG/M2 | RESPIRATION RATE: 16 BRPM | WEIGHT: 158 LBS

## 2018-12-04 DIAGNOSIS — F32.A DEPRESSION, UNSPECIFIED DEPRESSION TYPE: Primary | ICD-10-CM

## 2018-12-04 DIAGNOSIS — E78.5 HYPERLIPIDEMIA: ICD-10-CM

## 2018-12-04 DIAGNOSIS — Z00.8 MEDICAL CLEARANCE FOR PSYCHIATRIC ADMISSION: ICD-10-CM

## 2018-12-04 PROBLEM — F32.9 MAJOR DEPRESSIVE DISORDER, SINGLE EPISODE: Status: ACTIVE | Noted: 2018-12-04

## 2018-12-04 PROBLEM — F33.9 MAJOR DEPRESSION, RECURRENT (HCC): Status: ACTIVE | Noted: 2018-12-04

## 2018-12-04 LAB
25(OH)D3 SERPL-MCNC: 31.6 NG/ML (ref 30–100)
ALBUMIN SERPL BCP-MCNC: 3.7 G/DL (ref 3–5.2)
ALP SERPL-CCNC: 61 U/L (ref 43–122)
ALT SERPL W P-5'-P-CCNC: 28 U/L (ref 9–52)
ANION GAP SERPL CALCULATED.3IONS-SCNC: 5 MMOL/L (ref 5–14)
AST SERPL W P-5'-P-CCNC: 23 U/L (ref 14–36)
ATRIAL RATE: 93 BPM
BILIRUB SERPL-MCNC: 0.6 MG/DL
BILIRUB UR QL STRIP: NEGATIVE
BUN SERPL-MCNC: 20 MG/DL (ref 5–25)
CALCIUM SERPL-MCNC: 8.8 MG/DL (ref 8.4–10.2)
CHLORIDE SERPL-SCNC: 98 MMOL/L (ref 97–108)
CHOLEST SERPL-MCNC: 269 MG/DL
CLARITY UR: CLEAR
CO2 SERPL-SCNC: 33 MMOL/L (ref 22–30)
COLOR UR: NORMAL
CREAT SERPL-MCNC: 1.09 MG/DL (ref 0.6–1.2)
ERYTHROCYTE [DISTWIDTH] IN BLOOD BY AUTOMATED COUNT: 13.5 %
FOLATE SERPL-MCNC: >20 NG/ML (ref 3.1–17.5)
GFR SERPL CREATININE-BSD FRML MDRD: 52 ML/MIN/1.73SQ M
GLUCOSE P FAST SERPL-MCNC: 126 MG/DL (ref 70–99)
GLUCOSE SERPL-MCNC: 126 MG/DL (ref 70–99)
GLUCOSE UR STRIP-MCNC: NEGATIVE MG/DL
HCT VFR BLD AUTO: 39.2 % (ref 36–46)
HDLC SERPL-MCNC: 52 MG/DL (ref 40–59)
HGB BLD-MCNC: 12.8 G/DL (ref 12–16)
HGB UR QL STRIP.AUTO: NEGATIVE
KETONES UR STRIP-MCNC: NEGATIVE MG/DL
LDLC SERPL CALC-MCNC: 196 MG/DL
LEUKOCYTE ESTERASE UR QL STRIP: NEGATIVE
LYMPHOCYTES # BLD AUTO: 0.84 THOUSAND/UL (ref 0.5–4)
LYMPHOCYTES # BLD AUTO: 19 % (ref 20–50)
MCH RBC QN AUTO: 29.9 PG (ref 26–34)
MCHC RBC AUTO-ENTMCNC: 32.6 G/DL (ref 31–36)
MCV RBC AUTO: 92 FL (ref 80–100)
MONOCYTES # BLD AUTO: 0.26 THOUSAND/UL (ref 0.2–0.9)
MONOCYTES NFR BLD AUTO: 6 % (ref 1–10)
NEUTS BAND NFR BLD MANUAL: 3 % (ref 0–8)
NEUTS SEG # BLD: 3.3 THOUSAND/UL (ref 1.8–7.8)
NEUTS SEG NFR BLD AUTO: 72 %
NITRITE UR QL STRIP: NEGATIVE
NONHDLC SERPL-MCNC: 217 MG/DL
P AXIS: 57 DEGREES
PH UR STRIP.AUTO: 7 [PH] (ref 4.5–8)
PLATELET # BLD AUTO: 265 THOUSANDS/UL (ref 150–450)
PLATELET BLD QL SMEAR: ADEQUATE
PMV BLD AUTO: 7.3 FL (ref 8.9–12.7)
POTASSIUM SERPL-SCNC: 3.9 MMOL/L (ref 3.6–5)
PR INTERVAL: 170 MS
PROT SERPL-MCNC: 6.5 G/DL (ref 5.9–8.4)
PROT UR STRIP-MCNC: NEGATIVE MG/DL
QRS AXIS: 43 DEGREES
QRSD INTERVAL: 78 MS
QT INTERVAL: 350 MS
QTC INTERVAL: 435 MS
RBC # BLD AUTO: 4.28 MILLION/UL (ref 4–5.2)
RBC MORPH BLD: NORMAL
RPR SER QL: NORMAL
SODIUM SERPL-SCNC: 136 MMOL/L (ref 137–147)
SP GR UR STRIP.AUTO: 1.01 (ref 1–1.04)
T WAVE AXIS: 34 DEGREES
TOTAL CELLS COUNTED SPEC: 100
TRIGL SERPL-MCNC: 105 MG/DL
TSH SERPL DL<=0.05 MIU/L-ACNC: 2.18 UIU/ML (ref 0.47–4.68)
UROBILINOGEN UA: NEGATIVE MG/DL
VENTRICULAR RATE: 93 BPM
VIT B12 SERPL-MCNC: 907 PG/ML (ref 100–900)
WBC # BLD AUTO: 4.4 THOUSAND/UL (ref 4.5–11)

## 2018-12-04 PROCEDURE — 80053 COMPREHEN METABOLIC PANEL: CPT | Performed by: PSYCHIATRY & NEUROLOGY

## 2018-12-04 PROCEDURE — 81003 URINALYSIS AUTO W/O SCOPE: CPT | Performed by: PSYCHIATRY & NEUROLOGY

## 2018-12-04 PROCEDURE — 85007 BL SMEAR W/DIFF WBC COUNT: CPT | Performed by: PSYCHIATRY & NEUROLOGY

## 2018-12-04 PROCEDURE — 99232 SBSQ HOSP IP/OBS MODERATE 35: CPT | Performed by: INTERNAL MEDICINE

## 2018-12-04 PROCEDURE — 93010 ELECTROCARDIOGRAM REPORT: CPT | Performed by: INTERNAL MEDICINE

## 2018-12-04 PROCEDURE — 82746 ASSAY OF FOLIC ACID SERUM: CPT | Performed by: PSYCHIATRY & NEUROLOGY

## 2018-12-04 PROCEDURE — 80061 LIPID PANEL: CPT | Performed by: PSYCHIATRY & NEUROLOGY

## 2018-12-04 PROCEDURE — 82306 VITAMIN D 25 HYDROXY: CPT | Performed by: PSYCHIATRY & NEUROLOGY

## 2018-12-04 PROCEDURE — 86592 SYPHILIS TEST NON-TREP QUAL: CPT | Performed by: PSYCHIATRY & NEUROLOGY

## 2018-12-04 PROCEDURE — 84443 ASSAY THYROID STIM HORMONE: CPT | Performed by: PSYCHIATRY & NEUROLOGY

## 2018-12-04 PROCEDURE — 82607 VITAMIN B-12: CPT | Performed by: PSYCHIATRY & NEUROLOGY

## 2018-12-04 PROCEDURE — 85027 COMPLETE CBC AUTOMATED: CPT | Performed by: PSYCHIATRY & NEUROLOGY

## 2018-12-04 RX ORDER — HALOPERIDOL 5 MG/ML
2 INJECTION INTRAMUSCULAR EVERY 6 HOURS PRN
Status: DISCONTINUED | OUTPATIENT
Start: 2018-12-04 | End: 2018-12-11 | Stop reason: HOSPADM

## 2018-12-04 RX ORDER — HYDROXYZINE HYDROCHLORIDE 25 MG/1
25 TABLET, FILM COATED ORAL EVERY 6 HOURS PRN
Status: DISCONTINUED | OUTPATIENT
Start: 2018-12-04 | End: 2018-12-11 | Stop reason: HOSPADM

## 2018-12-04 RX ORDER — BENZTROPINE MESYLATE 1 MG/1
1 TABLET ORAL EVERY 6 HOURS PRN
Status: DISCONTINUED | OUTPATIENT
Start: 2018-12-04 | End: 2018-12-11 | Stop reason: HOSPADM

## 2018-12-04 RX ORDER — HALOPERIDOL 0.5 MG/1
2 TABLET ORAL EVERY 8 HOURS PRN
Status: DISCONTINUED | OUTPATIENT
Start: 2018-12-04 | End: 2018-12-11 | Stop reason: HOSPADM

## 2018-12-04 RX ORDER — MAGNESIUM HYDROXIDE/ALUMINUM HYDROXICE/SIMETHICONE 120; 1200; 1200 MG/30ML; MG/30ML; MG/30ML
30 SUSPENSION ORAL EVERY 4 HOURS PRN
Status: DISCONTINUED | OUTPATIENT
Start: 2018-12-04 | End: 2018-12-11 | Stop reason: HOSPADM

## 2018-12-04 RX ORDER — ATORVASTATIN CALCIUM 10 MG/1
10 TABLET, FILM COATED ORAL
Status: DISCONTINUED | OUTPATIENT
Start: 2018-12-04 | End: 2018-12-11 | Stop reason: HOSPADM

## 2018-12-04 RX ORDER — TRAZODONE HYDROCHLORIDE 50 MG/1
25 TABLET ORAL
Status: DISCONTINUED | OUTPATIENT
Start: 2018-12-04 | End: 2018-12-11 | Stop reason: HOSPADM

## 2018-12-04 RX ORDER — RISPERIDONE 1 MG/1
1 TABLET, ORALLY DISINTEGRATING ORAL EVERY 8 HOURS PRN
Status: DISCONTINUED | OUTPATIENT
Start: 2018-12-04 | End: 2018-12-11 | Stop reason: HOSPADM

## 2018-12-04 RX ORDER — ACETAMINOPHEN 325 MG/1
650 TABLET ORAL EVERY 6 HOURS PRN
Status: DISCONTINUED | OUTPATIENT
Start: 2018-12-04 | End: 2018-12-11 | Stop reason: HOSPADM

## 2018-12-04 RX ADMIN — SERTRALINE HYDROCHLORIDE 50 MG: 50 TABLET ORAL at 10:00

## 2018-12-04 RX ADMIN — ACETAMINOPHEN 650 MG: 325 TABLET ORAL at 04:56

## 2018-12-04 NOTE — ASSESSMENT & PLAN NOTE
Continue with psychotropic medications  Continue care per primary team  Patient admitted to inpatient psych

## 2018-12-04 NOTE — EMTALA/ACUTE CARE TRANSFER
[facility]  [address] [address]  Dept: 36[address] Diandra Allison                                         1950                              MRN [id number]    I have been informed of my rights regarding examination, treatment, and transfer   by Dr Marino DO    Benefits: Specialized equipment and/or services available at the receiving facility (Include comment)________________________    Risks: Potential for delay in receiving treatment, Increased discomfort during transfer      Consent for Transfer:  I acknowledge that my medical condition has been evaluated and explained to me by the emergency department physician or other qualified medical person and/or my attending physician, who has recommended that I be transferred to the service of  Accepting Physician: Dr Benita Bhardwaj at [address] [place] [address] Name, [address] : [facility]   The above potential benefits of such transfer, the potential risks associated with such transfer, and the probable risks of not being transferred have been explained to me, and I fully understand them  The doctor has explained that, in my case, the benefits of transfer outweigh the risks  I agree to be transferred  I authorize the performance of emergency medical procedures and treatments upon me in both transit and upon arrival at the receiving facility  Additionally, I authorize the release of any and all medical records to the receiving facility and request they be transported with me, if possible  I understand that the safest mode of transportation during a medical emergency is an ambulance and that the Hospital advocates the use of this mode of transport   Risks of traveling to the receiving facility by car, including absence of medical control, life sustaining equipment, such as oxygen, and medical personnel has been explained to me and I fully understand them     ([facility])  [  ]  I consent to the stated transfer and to be transported by ambulance/helicopter  [  ]  I consent to the stated transfer, but refuse transportation by ambulance and accept full responsibility for my transportation by car  I understand the risks of non-ambulance transfers and I exonerate the Hospital and its staff from any deterioration in my condition that results from this refusal     X___________________________________________    DATE  18  TIME________  Signature of patient or legally responsible individual signing on patient behalf           RELATIONSHIP TO PATIENT_________________________          Provider Certification    NAME Nomi Perdomo                                         1950                              MRN [id number]    A medical screening exam was performed on the above named patient  Based on the examination:    Condition Necessitating Transfer The primary encounter diagnosis was Depression  A diagnosis of Anxiety was also pertinent to this visit      Patient Condition: The patient has been stabilized such that within reasonable medical probability, no material deterioration of the patient condition or the condition of the unborn child(savage) is likely to result from the transfer    Reason for Transfer: Level of Care needed not available at this facility    Transfer Requirements:  [address]    · Space available and qualified personnel available for treatment as acknowledged by Ric Keith  · Agreed to accept transfer and to provide appropriate medical treatment as acknowledged by       Dr Junior Hinojosa  · Appropriate medical records of the examination and treatment of the patient are provided at the time of transfer   [address] _______  · Transfer will be performed by qualified personnel from [facility]  and appropriate transfer equipment as required, including the use of necessary and appropriate life support measures  Provider Certification: I have examined the patient and explained the following risks and benefits of being transferred/refusing transfer to the patient/family:  General risk, such as traffic hazards, adverse weather conditions, rough terrain or turbulence, possible failure of equipment (including vehicle or aircraft), or consequences of actions of persons outside the control of the transport personnel      Based on these reasonable risks and benefits to the patient and/or the unborn child(savage), and based upon the information available at the time of the patients examination, I certify that the medical benefits reasonably to be expected from the provision of appropriate medical treatments at another medical facility outweigh the increasing risks, if any, to the individuals medical condition, and in the case of labor to the unborn child, from effecting the transfer      X____________________________________________ DATE 12/03/18        TIME_______      ORIGINAL - SEND TO MEDICAL RECORDS   COPY - SEND WITH PATIENT DURING TRANSFER

## 2018-12-04 NOTE — PROGRESS NOTES
Alert,awake and visible on the unit  Pt reported feeling depressed,denies any thoughts of self harm at this time  Pt reported feeling safe here in the hospital,adgusting to the unit well  Pt was started on Zoloft 50 mg po daily, education was provided to pt  Pt wants to check with her PCP before taking it  Lab work from today was reported to Harris Lopez NP  Will continue to monitor and provide support

## 2018-12-04 NOTE — NURSING NOTE
Patient is nice and pleasant to talk  She is in the good mood and refused Lipitor and wants to discuss it with her primary doctor  She stated she was watching her cholesterol levels and was not concerned  Will continue to monitor

## 2018-12-04 NOTE — ED NOTES
Patient is accepted at [facility]  Patient is accepted by Miranda Buenrostro  per [address] is arranged with [facility] [place], operated by [facility] is scheduled for 330am    Nurse report is to be called to [phone]   prior to patient transfer

## 2018-12-04 NOTE — ED NOTES
Patient ambulated to the bathroom  Patient has no concerns at this time       Love Phillips RN  12/04/18 5095
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
## 2018-12-04 NOTE — PROGRESS NOTES
Awake, alert, oriented  Admitted as 201 status of Dr Samia Aguiar  Denies any SI/HI at time of admission, admits to having SI with plan to cut wrists prior to admission  Pleasant and cooperative on approach  Depressed, anxious at times  Medicated with PRN Tylenol at this time for 6/10 headache  Neurologically intact with equal strengths bilaterally  Heart rate regular, + pulses, trace b/l EL edema; denies any calf tenderness  Lungs CTA bilaterally, no cough observed at this time  Abdomen soft, non-tender, non-distended, LBM 12/3/18 per pt  Ambulates independently through hallway with steady gait, denies history of falls  SLIM provider notified of admission

## 2018-12-04 NOTE — TREATMENT PLAN
TREATMENT PLAN REVIEW - [facility] [place] 76 y o  1950 female MRN: [id number]    [facility] [address] [facility] Room / Bed: Ronen Patel/-66 Encounter: [id number]          Admit Date/Time:  12/4/2018  4:16 AM    Treatment Team: Attending Provider: Sterling Babcock MD; Registered Nurse: Em Fontana, RN; Patient Care Assistant: Alphonse Chan; Day Respiratory Care Practitioner: Kishor Weiss RT; Registered Nurse: Bartolo Renteria, ALVARADO; Patient Care Assistant: Guilherme Mora; : Juan Antonio Zamora; Occupational Therapist: Zunilda Mathews OT; : Juan Antonio Zamora    Diagnosis: Principal Problem:    Major depression, recurrent [facility])      Patient Strengths/Assets: ability for insight, adapts well, motivation for treatment/growth    Patient Barriers/Limitations: limited family ties, limited support system, low self esteem    Short Term Goals: decrease in depressive symptoms    Long Term Goals: improvement in depression    Progress Towards Goals: starting psychiatric medications as prescribed    Recommended Treatment: medication management, patient medication education, group therapy, milieu therapy, continued Behavioral Health psychiatric evaluation/assessment process    Treatment Frequency: daily medication monitoring, group and milieu therapy daily, monitoring through interdisciplinary rounds, monitoring through weekly patient care conferences    Expected Discharge Date: 1 week    Discharge Plan: discharge to home    Treatment Plan Created/Updated By: Alphonse Nielsen PA-C

## 2018-12-04 NOTE — PLAN OF CARE
Anxiety     Anxiety is at manageable level Progressing        Depression     Treatment Goal: Demonstrate behavioral control of depressive symptoms, verbalize feelings of improved mood/affect, and adopt new coping skills prior to discharge Progressing     Verbalize thoughts and feelings Progressing     Refrain from harming self Progressing     Refrain from [address] from self-neglect Progressing     Attend and participate in unit activities, including therapeutic, recreational, and educational groups Progressing     Complete daily ADLs, including personal hygiene independently, as able Progressing

## 2018-12-04 NOTE — TELEPHONE ENCOUNTER
Patient called stating she is in behavioral health at [facility], they want to stop her Effexor and change her to 50 mg of Zoloft this morning  Wants your thoughts on this? Will call back in about an hour

## 2018-12-04 NOTE — CONSULTS
Consult- Nomi Perdomo 1950, 76 y o  female MRN: [id number]    Unit/Bed#: [facility] 7[address]-12 Encounter: [id number]    Primary Care Provider: Johnathan Johnson DO   Date and time admitted to hospital: 12/4/2018  4:16 AM      Consults    * Major depressive disorder, single episode   Assessment & Plan    Continue with psychotropic medications  Continue care per primary team  Patient admitted to inpatient psych       Patient with no active medical problems  Will sign off at this time and will be available p r n  Toya Black Please call us for any additional questions  Total Time for Visit, including Counseling / Coordination of Care: 20 minutes  Greater than 50% of this total time spent on direct patient counseling and coordination of care  Reason for consultation:   Medical management    History of Present Illness:    Nomi Perdomo is a 76 y o  female who is admitted to inpatient psych for worsening depression and suicidal ideations  Patient does have a history of hypothyroidism for which she no longer takes medications as her thyroid function tests have improved then also reports a distant history of tonsillar cancer  Denies any chronic medications other than her psych medications  Denies any chest pain, shortness of breath, nausea, vomiting, diarrhea  Review of Systems:    12 point review of systems is grossly negative       Past Medical and Surgical History:     Past Medical History:   Diagnosis Date    Fall     last assessed - 14Nov2012    Impacted cerumen of left ear     last assessed - [id number]    Malignant neoplasm without specification of site [facility]) 06/2004    Phlebitis and thrombophlebitis of deep vessels of lower extremities, unspecified laterality ([facility] [address] )        Past Surgical History:   Procedure Laterality Date    CYSTOSCOPY      Diagnostic    GASTROJEJUNOSTOMY      Percutaneous    LAPAROSCOPIC APPENDECTOMY      OTHER SURGICAL HISTORY      1)Biopsy oropharynx tonsillar fossa; 2)Chemotherapy Administration with chemoport for tonsillar cancer    TONSILLECTOMY      VENA CAVA RECONSTRUCTION      Inferior vena cava       Meds/Allergies:    Prior to Admission medications    Medication Sig Start Date End Date Taking? Authorizing Provider   LORazepam (ATIVAN) 1 mg tablet Take 1 tablet (1 mg total) by mouth daily at bedtime  Patient taking differently: Take 1 mg by mouth daily as needed   7/65/71   Belem Gomes, DO   venlafaxine (EFFEXOR-XR) 75 mg 24 hr capsule Take 1 capsule (75 mg total) by mouth 3 (three) times a day  Patient taking differently: Take 150 mg by mouth every morning   5/86/55   Belemvishal Gomes, DO   venlafaxine (EFFEXOR-XR) 75 mg 24 hr capsule Take 75 mg by mouth daily after dinner      Historical Provider, MD     I have reviewed home medications using allscripts  Allergies: Allergies   Allergen Reactions    Morphine Other (See Comments)     "confusion & paranoid behavior"    Pineapple Tongue Swelling     Tongue bleeding       Social History:     Marital Status: Single     Substance Use History:   History   Alcohol Use No     History   Smoking Status    Former Smoker    Quit date: 2000   Smokeless Tobacco    Never Used     History   Drug Use No       Family History:    Family History   Problem Relation Age of Onset    Breast cancer Mother         unspecified laterality        Bone cancer Family     Breast cancer Family         unspecified laterality       Physical Exam:     Vitals:   Blood Pressure: 152/89 (12/04/18 0437)  Pulse: 76 (12/04/18 0437)  Temperature: (!) 97 2 °F (36 2 °C) (12/04/18 0437)  Temp Source: Temporal (12/04/18 0437)  Respirations: 16 (12/04/18 0437)  Height: 5' 4" (162 6 cm) (12/04/18 0437)  Weight - Scale: 72 7 kg (160 lb 4 4 oz) (12/04/18 0437)  SpO2: 92 % (12/04/18 0437)    General:  No apparent distress  Comfortable  HEENT:  EOMI  Mucous membranes moist   Cardiovascular:  S1-S2  Pulmonary: Clear to auscultation b/l   No rales, rhonchi Abdomen: soft, NT/ND  BS +ve  Extremities: FROM  No cyanosis, trace pedal edema  Skin: warm, dry, intact  Neurological: Alert and oriented x 3  No focal deficits  Psychiatric:  Depressed mood and affect      Additional Data:     Lab Results: I have personally reviewed pertinent reports  Results from last 7 days  Lab Units 12/03/18  1645   WBC Thousand/uL 5 12   HEMOGLOBIN g/dL 13 6   HEMATOCRIT % 42 1   PLATELETS Thousands/uL 271   NEUTROS PCT % 65   LYMPHS PCT % 21   MONOS PCT % 12   EOS PCT % 1       Results from last 7 days  Lab Units 12/04/18  0551   POTASSIUM mmol/L 3 9   CHLORIDE mmol/L 98   CO2 mmol/L 33*   BUN mg/dL 20   CREATININE mg/dL 1 09   CALCIUM mg/dL 8 8   ALK PHOS U/L 61   ALT U/L 28   AST U/L 23           Imaging: I have personally reviewed pertinent reports  Allscripts / Epic Records Reviewed: Yes     ** Please Note: This note has been constructed using a voice recognition system   **
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
## 2018-12-05 RX ADMIN — SERTRALINE HYDROCHLORIDE 50 MG: 50 TABLET ORAL at 08:20

## 2018-12-05 RX ADMIN — ACETAMINOPHEN 650 MG: 325 TABLET ORAL at 20:05

## 2018-12-05 RX ADMIN — HYDROXYZINE HYDROCHLORIDE 25 MG: 25 TABLET ORAL at 12:12

## 2018-12-05 NOTE — PROGRESS NOTES
Pt reported increase anxiety and Atarax 25 mg po PRN given per pt request at 0570 with good report noted  Pt in dinning room just finished her lunch,no distress noted  No complaints at this time  Will continue to monitor closely

## 2018-12-05 NOTE — PROGRESS NOTES
Psychiatry Progress Note    Subjective: Interval History     The patient is lying in bed this morning  She states she is not hungry and does not want to get out of bed  She continues to be depressed and withdrawn  Patient was not caring for herself properly at home  She lives alone  Patient states she did sleep well  She was started on Zoloft yesterday  She denies any suicidal ideations  She did have some of these thoughts when she was admitted        Current medications:    Current Facility-Administered Medications:     acetaminophen (TYLENOL) tablet 650 mg, 650 mg, Oral, Q6H PRN, Tracy Canales MD, 650 mg at 12/04/18 0456    aluminum-magnesium hydroxide-simethicone (MYLANTA) 200-200-20 mg/5 mL oral suspension 30 mL, 30 mL, Oral, Q4H PRN, Tracy Canales MD    atorvastatin (LIPITOR) tablet 10 mg, 10 mg, Oral, Daily With Dinner, JOYCE Kim    benztropine (COGENTIN) tablet 1 mg, 1 mg, Oral, Q6H PRN, Tracy Canales MD    haloperidol (HALDOL) tablet 2 mg, 2 mg, Oral, Q8H PRN, Tracy Canales MD    haloperidol lactate (HALDOL) injection 2 mg, 2 mg, Intramuscular, Q6H PRN, Tracy Canales MD    hydrOXYzine HCL (ATARAX) tablet 25 mg, 25 mg, Oral, Q6H PRN, Tracy Canales MD    magnesium hydroxide (MILK OF MAGNESIA) 400 mg/5 mL oral suspension 30 mL, 30 mL, Oral, Daily PRN, Tracy Canales MD    risperiDONE (RisperDAL M-TABS) dispersible tablet 1 mg, 1 mg, Oral, Q8H PRN, Tracy Canales MD    sertraline (ZOLOFT) tablet 50 mg, 50 mg, Oral, Daily, Reshma Iqbal PA-C, 50 mg at 12/05/18 0820    traZODone (DESYREL) tablet 25 mg, 25 mg, Oral, HS PRN, Tracy Canales MD    Current Problem List:    Patient Active Problem List   Diagnosis    Chronic fatigue    Depression    Fibromyalgia    Generalized anxiety disorder    Hyperlipidemia    Hypothyroidism, adult    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    Mild cognitive impairment    Osteoporosis    [facility]    Vitamin D deficiency    Anal lesion    Major depression, recurrent ([facility] )       Problem list reviewed 12/05/18     Objective:     Vital Signs:  Vitals:    12/04/18 0437 12/04/18 0743 12/04/18 1557 12/05/18 0716   BP: 152/89 124/73 114/69 106/64   BP Location: Left arm Left arm Right arm Left arm   Pulse: 76 76 70 65   Resp: 16 18 18 18   Temp: (!) 97 2 °F (36 2 °C) (!) 97 4 °F (36 3 °C) 97 6 °F (36 4 °C) 98 5 °F (36 9 °C)   TempSrc: Temporal Temporal Tympanic Tympanic   SpO2: 92% 97% 96% 95%   Weight: 72 7 kg (160 lb 4 4 oz)      Height: 5' 4" (1 626 m)            Appearance:  age appropriate and casually dressed   Behavior:  normal   Speech:  soft   Mood:  constricted and depressed   Affect:  constricted   Thought Process:  normal   Thought Content:  normal   Perceptual Disturbances: None   Risk Potential: none   Sensorium:  person, place, situation and time   Cognition:  intact   Consciousness:  alert and awake    Attention: attention span and concentration were age appropriate   Intellect: average   Insight:  fair   Judgment: fair      Motor Activity: no abnormal movements       Behavior over the last 24 hours:  unchanged  Sleep: normal  Appetite: normal  Medication side effects: No  ROS: no complaints      I/O Past 24 hours:  I/O last 3 completed shifts: In: 900 [P O :900]  Out: -   No intake/output data recorded  Labs:  Reviewed 12/05/18    Assessment / Plan:     Major depression, recurrent ([facility] )    Recommended Treatment:      Medication changes:  1) continue current medication regimen  Zoloft started yesterday  Non-pharmacological treatments  1) Continue with group therapy, milieu therapy and occupational therapy  Safety  1) Safety/communication plan established targeting dynamic risk factors above  2) Risks, benefits, and possible side effects of medications explained to patient and patient verbalizes understanding  Counseling / Coordination of Care    Total floor / unit time spent today 20 minutes   Greater than 50% of total time was spent with the patient and / or family counseling and / or coordination of care  A description of the counseling / coordination of care  Patient's Rights, confidentiality and exceptions to confidentiality, use of automated medical record, [facility] staff access to medical record, and consent to treatment reviewed      Monisha Garrett PA-C

## 2018-12-05 NOTE — PROGRESS NOTES
Pt attended healthy relationships/conflict resolution group  Pt hopeless and helpless with depressive thoughts and tearful during group  Pt able to express needs and self reflect with cognitive awareness  Pt mentioned she is a cancer survivor, family dynamics with sister (suspicious of relationship breakdown), grief and attended codependency groups in the past   Pt able to engage in interactions and assert her boundaries  Pt expressed difficulty with control issues and dealing with MH needs  Continue to provide therapeutic group support

## 2018-12-05 NOTE — PROGRESS NOTES
Pt noted to be more visible on the unit and social with select peers  Pt refused Lipitor at this time,stated she wants to follow up with her PCP on discharge  No distress noted  No behavior issues at this time  Will continue to monitor closely

## 2018-12-05 NOTE — PROGRESS NOTES
Alert,awake but isolative to her room this morning,stated that she wasn't hungry  Pt continues to be depressed ,denies any thoughts of self harm this morning  Med compliant  pt awake at this time and taking shower  No other complaints at this time  Will continue to monitor closely

## 2018-12-05 NOTE — PROGRESS NOTES
Monitored on Q 7 minute safety checks  Currently observed in bed with eyes closed and even respirations noted  Appears to be sleeping  No behavior issues noted

## 2018-12-05 NOTE — PLAN OF CARE
Anxiety     Anxiety is at manageable level Progressing        Depression     Treatment Goal: Demonstrate behavioral control of depressive symptoms, verbalize feelings of improved mood/affect, and adopt new coping skills prior to discharge Progressing     Verbalize thoughts and feelings Progressing     Refrain from harming self Progressing     Refrain from [address] from self-neglect Progressing     Attend and participate in unit activities, including therapeutic, recreational, and educational groups Progressing     Complete daily ADLs, including personal hygiene independently, as able [address] Discharge to home or other facility with appropriate resources Progressing

## 2018-12-06 RX ADMIN — SERTRALINE HYDROCHLORIDE 50 MG: 50 TABLET ORAL at 08:28

## 2018-12-06 NOTE — NURSING NOTE
Patient has been visible in unit, pleasant and cooperative on approach  Needy at the nurses station during the evening, no irritability or aggression  Given tylenol 650 mg po at 2005 for 6/10 headache which has been effective  Labs, orders and vital signs have been reviewed  On routine checks, will continue to monitor

## 2018-12-06 NOTE — PROGRESS NOTES
Psychiatry Progress Note    Subjective: Interval History     Patient is lying in bed this morning  She continues to be withdrawn  She refused breakfast yesterday and ate lunch and dinner  Patient states she is feeling a little better here in the hospital because she is around people and not alone  Patient was recently started on Zoloft  She continues to be helpless and needy at times  Patient has soft speech  She has thought blocking during conversation  Patient states she has been feeling anxious  She states I am afraid to be old and alone    Patient states she does not have a therapist but would like to start seeing one  Patient denies suicidal ideations        Current medications:    Current Facility-Administered Medications:     acetaminophen (TYLENOL) tablet 650 mg, 650 mg, Oral, Q6H PRN, Luke Clayton MD, 650 mg at 12/05/18 2005    aluminum-magnesium hydroxide-simethicone (MYLANTA) 200-200-20 mg/5 mL oral suspension 30 mL, 30 mL, Oral, Q4H PRN, Luke Clayton MD    atorvastatin (LIPITOR) tablet 10 mg, 10 mg, Oral, Daily With Dinner, Vinnie Apley Ciminieri, CRNP    benztropine (COGENTIN) tablet 1 mg, 1 mg, Oral, Q6H PRN, Luke Clayton MD    haloperidol (HALDOL) tablet 2 mg, 2 mg, Oral, Q8H PRN, Luke Clayton MD    haloperidol lactate (HALDOL) injection 2 mg, 2 mg, Intramuscular, Q6H PRN, Luke Clayton MD    hydrOXYzine HCL (ATARAX) tablet 25 mg, 25 mg, Oral, Q6H PRN, uLke Clayton MD, 25 mg at 12/05/18 1212    magnesium hydroxide (MILK OF MAGNESIA) 400 mg/5 mL oral suspension 30 mL, 30 mL, Oral, Daily PRN, Luke Clayton MD    risperiDONE (RisperDAL M-TABS) dispersible tablet 1 mg, 1 mg, Oral, Q8H PRN, Luke Clayton MD    sertraline (ZOLOFT) tablet 50 mg, 50 mg, Oral, Daily, Reshma Iqbal PA-C, 50 mg at 12/06/18 6581    traZODone (DESYREL) tablet 25 mg, 25 mg, Oral, HS PRN, Luke Clayton MD    Current Problem List:    Patient Active Problem List   Diagnosis    Chronic fatigue    Depression    Fibromyalgia  Generalized anxiety disorder    Hyperlipidemia    Hypothyroidism, adult    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HCC)    Mild cognitive impairment    Osteoporosis    [facility]    Vitamin D deficiency    Anal lesion    [facility] depression, recurrent [facility] )       Problem list reviewed 12/06/18     Objective:     Vital Signs:  Vitals:    12/04/18 1557 12/05/18 0716 12/05/18 1617 12/06/18 0701   BP: 114/69 106/64 134/88 161/72   BP Location: Right arm Left arm Left arm Right arm   Pulse: 70 65 88 70   Resp: 18 18 18   Temp: 97 6 °F (36 4 °C) 98 5 °F (36 9 °C) 98 2 °F (36 8 °C) 98 3 °F (36 8 °C)   TempSrc: Tympanic Tympanic Temporal Temporal   SpO2: 96% 95% 95% 97%   Weight:       Height:             Appearance:  age appropriate and casually dressed   Behavior:  normal   Speech:  soft   Mood:  constricted and depressed   Affect:  constricted   Thought Process:  normal   Thought Content:  normal   Perceptual Disturbances: None   Risk Potential: none   Sensorium:  person, place, situation and time   Cognition:  intact   Consciousness:  alert and awake    Attention: attention span and concentration were age appropriate   Intellect: average   Insight:  fair   Judgment: fair      Motor Activity: no abnormal movements       Behavior over the last 24 hours:  unchanged  Sleep: normal  Appetite: normal  Medication side effects: No  ROS: no complaints      I/O Past 24 hours:  I/O last 3 completed shifts: In: 600 [P O :600]  Out: -   No intake/output data recorded  Labs:  Reviewed 12/06/18    Assessment / Plan:     Major depression, recurrent ([facility] )    Recommended Treatment:      Medication changes:  1) continue current medication regimen  Zoloft recently started  Non-pharmacological treatments  1) Continue with group therapy, milieu therapy and occupational therapy  Safety  1) Safety/communication plan established targeting dynamic risk factors above      2) Risks, benefits, and possible side effects of medications explained to patient and patient verbalizes understanding  Counseling / Coordination of Care    Total floor / unit time spent today 20 minutes  Greater than 50% of total time was spent with the patient and / or family counseling and / or coordination of care  A description of the counseling / coordination of care  Patient's Rights, confidentiality and exceptions to confidentiality, use of automated medical record, [facility] staff access to medical record, and consent to treatment reviewed      Too Knott PA-C

## 2018-12-06 NOTE — PROGRESS NOTES
Pt attended reminscence group  Pt alert and engaged in group discussion  Pt expressed that she was interested in the group topic  Pt did speak at length and needed to be redirected for the opportunity for other peers to express thoughts and opinions on subject  Continue to provide therpeuitc group support  Constance Meredith

## 2018-12-06 NOTE — PROGRESS NOTES
Pt initially refused breakfast this morning, but then came out when breakfast was over and did ate cereal Pt continues to complains of ongoing depression and anxiety,denies any thoughts of self harm at this time  Med compliant  No distress noted  Encouraged pt to attend groups  Will continue to monitor closely

## 2018-12-06 NOTE — NURSING NOTE
Patient has been visible in unit, pleasant and cooperative  Anxious at times, preoccupied and on the phone often  Patient has been compliant with medications and meals  Labs, orders and vital signs have been reviewed  On routine checks, will continue to monitor

## 2018-12-06 NOTE — NURSING NOTE
Patient has been in bed asleep through the night  No concerns expressed, no apparent distress noted  On routine checks, will continue to monitor

## 2018-12-06 NOTE — PROGRESS NOTES
12/06/18 1000   Activity/Group Checklist   Group Other (Comment)  (Art Therapy Process Group / Open Choice)   Attendance Attended   Attendance Duration (min) Greater than 60   Interactions Interacted appropriately   Affect/Mood Other (Comment); Appropriate  (Labile, but redirectable)   Goals Achieved Identified feelings; Discussed self-esteem issues; Increased hopefulness; Able to listen to others; Identified distorted thoughts/beliefs; Able to engage in interactions; Able to reflect/comment on own behavior;Able to manage/cope with feelings; Able to recieve feedback; Able to give feedback to another;Able to experience relief/decrease in symptoms  (Able to engage art materials and gain insight)     Patient process lacked full focus and connection; emotions were labile as she frequently reacted to others  Artwork reflected lack of personal connection and commitment, though it may have been an effort to help contain her emotionally reactive state  Sense of self is uncertain, not in natural environment; alone  Patient able to share imagery and fully participate in discussion  Able to acknowledge she has "huge" abandonment issues and is hypersensitive to how others may perceive her  Patient appears motivated and willing to challenge self to gain insight

## 2018-12-07 PROCEDURE — 97150 GROUP THERAPEUTIC PROCEDURES: CPT

## 2018-12-07 PROCEDURE — 97166 OT EVAL MOD COMPLEX 45 MIN: CPT

## 2018-12-07 RX ADMIN — SERTRALINE HYDROCHLORIDE 50 MG: 50 TABLET ORAL at 08:21

## 2018-12-07 NOTE — PROGRESS NOTES
Visible on the unit and social with select peers  Pleasant and cooperative with staff  Pt refused her scheduled dose of Lipitor,stated she is going to addressed it with her PCP on discharge  Will continue to monitor closely

## 2018-12-07 NOTE — PROGRESS NOTES
Appears to be sleeping, in bed with eyes closed and respirations noted  Monitored on Q 7 minute safety checks  No behavior issues noted

## 2018-12-07 NOTE — OCCUPATIONAL THERAPY NOTE
Occupational Therapy Group Treatment Note      Min Dillon    12/7/2018    Patient Active Problem List   Diagnosis    Chronic fatigue    Depression    Fibromyalgia    Generalized anxiety disorder    Hyperlipidemia    Hypothyroidism, adult    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    Mild cognitive impairment    Osteoporosis    Tonsil cancer ([facility]    Vitamin D deficiency    Anal lesion    Major depression, recurrent ([facility] )       Past Medical History:   Diagnosis Date    Fall     last assessed - [id number]    Impacted cerumen of left ear     last assessed - [id number]    Malignant neoplasm without specification of site [facility]) 06/2004    Phlebitis and thrombophlebitis of deep vessels of lower extremities, unspecified laterality ([facility] )        Past Surgical History:   Procedure Laterality Date    CYSTOSCOPY      Diagnostic    GASTROJEJUNOSTOMY      Percutaneous    LAPAROSCOPIC APPENDECTOMY      OTHER SURGICAL HISTORY      1)Biopsy oropharynx tonsillar fossa; 2)Chemotherapy Administration with chemoport for tonsillar cancer    TONSILLECTOMY      VENA CAVA RECONSTRUCTION      Inferior vena cava        12/07/18 1015   Assessment   Assessment Odalys Tavares was present, engaged for the full OT Life Management session  She was attentive to stress management bingo activity  She explored with the group types of stressors, symptoms, and relievers  She marked her card accordingly  She was pleasant, insightful, often able to explain select phrases to the group  She was well-invested in group activity  Progress has been consistent towards her goals  Her efforts and contributions were commended  Plan   Treatment Interventions ADL retraining;Continued evaluation; Activityengagement  (coping skills instruction, life management instruction)   Goal Expiration Date 01/06/19   Treatment Day 1   OT Frequency 5x/wk   Dima Bowles, OT

## 2018-12-07 NOTE — PROGRESS NOTES
Alert,awake and visible during breakfast time in dinning room  Pleasant and brighter on approach,socila with select peers  Med compliant  Pt reported good sleep,some improvement in her depression  Pt continues to report anxiety at times  Pt denies any thoughts of self harm at this time  No distress noted  Will continue to monitor closely

## 2018-12-07 NOTE — PROGRESS NOTES
Pt attended positive coping skills group  Pt imrpoved in mood and thought process  Positive interactions with peers , respectful  Pt able to self reflect  Pt idenfied that she needs to address self esteem and self worth  Pt recognized that she has patterns of behaviors to manipulate others for her personal gain  Pt expressed learning skills develop positive coping strategies and behaviors  Pt expressed a willingness to address  Discussed examples of positive coping skills, strengths, interests, goals, and organization skills  Continue to provide therpeutic group support

## 2018-12-07 NOTE — PROGRESS NOTES
12/07/18 1000   Activity/Group Checklist   Group Other (Comment)  (Art Therapy Process Group / Open Choice, Discussion)   Attendance Attended   Attendance Duration (min) 46-60   Interactions Interacted appropriately   Affect/Mood Appropriate   Goals Achieved Identified feelings; Identified triggers; Discussed coping strategies; Discussed self-esteem issues; Displayed empathy;Identified distorted thoughts/beliefs; Able to listen to others; Able to engage in interactions; Able to reflect/comment on own behavior;Able to manage/cope with feelings;Verbalized increased hopefulness; Able to self-disclose; Able to recieve feedback; Able to give feedback to another;Able to experience relief/decrease in symptoms  (Able to engage art materials and gain insight)     Patient able to continue with unfinished artwork, however, remained unable to commit  Artwork and process reflect detachment and ungrounded chaos she feels in her life  Patient was able to independently disclose to the group that she "tries to be more giving and loving than she really is to manipulate people into liking her"  This therapist praised her for her honesty and courage to admit this  She was able to also acknowledged her hypersensitivity to rejection and criticism  This therapist provided her with greater understanding of unreconciled emotions resulting from past trauma  Patient was clearly more present and receptive to feedback and insight

## 2018-12-07 NOTE — SOCIAL WORK
Patient continues to be pleasant and cooperative while on the unit  Patient is medication and meal compliant  Writer met with patient at length; discussed speaking with her sister, staying on the unit longer, and continuing to attend groups since she is feeling better with them  Patient continues to be social and visible on the unit  SW will continue to follow and provide services as needed

## 2018-12-07 NOTE — OCCUPATIONAL THERAPY NOTE
Occupational Therapy Evaluation      juan antonio John    12/7/2018    Patient Active Problem List   Diagnosis    Chronic fatigue    Depression    Fibromyalgia    Generalized anxiety disorder    Hyperlipidemia    Hypothyroidism, adult    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[facility]    Mild cognitive impairment    Osteoporosis    Tonsil cancer ([facility] )    Vitamin D deficiency    Anal lesion    [facility] ([facility]       Past Medical History:   Diagnosis Date    Fall     last assessed - 14Nov2012    Impacted cerumen of left ear     last assessed - [id number]    Malignant neoplasm without specification of site [facility]) 06/2004    Phlebitis and thrombophlebitis of deep vessels of lower extremities, unspecified laterality ([facility] )        Past Surgical History:   Procedure Laterality Date    CYSTOSCOPY      Diagnostic    GASTROJEJUNOSTOMY      Percutaneous    LAPAROSCOPIC APPENDECTOMY      OTHER SURGICAL HISTORY      1)Biopsy oropharynx tonsillar fossa; 2)Chemotherapy Administration with chemoport for tonsillar cancer    TONSILLECTOMY      VENA CAVA RECONSTRUCTION      Inferior vena cava        12/07/18 0920   Note Type   Note type Eval/Treat   Restrictions/Precautions   Other Precautions (15 minute behavioral health checks)   Pain Assessment   Pain Assessment 0-10   Pain Score 1   Pain Location Other (Comment)  (hiatal hernia- bottom of ribs)   Clinical Progression Other (Comment)  (gets this on occasion when eating, goes away)   Hospital Pain Intervention(s) Declines   Home Living   Type of [address] Multi-level;Bed/bath upstairs   Additional Comments She stated that her bathroom is on the second level, she sleeps on the second level  She stated she is OK with the steps, there is one side railing  No steps to enter her home     Prior Function   Level of [place] Independent with ADLs and functional mobility  (reports independence in all IADL's)   Lives With Alone   Receives Help From Other (Comment)  (she stated that she does not have supports, this is issue)   ADL Assistance Independent   IADLs Independent   Falls in the last 6 months 0   Vocational Part time employment  (receives social security, is concerned about job)   Comments She stated that she does plan to return to her house, she likes her house, but this is a "huge emotional responsibility" for her combined with that she does not have supports (she stated that she does not know why she does not have supports, she has tried in past, has been socially active)  She stated that she came to the hospital due to depression and her feeling that she did not want to do anything, she attributed this to lack of connections (and the holidays)  Per her chart she was admitted to inpatient psyche for worsening depression and suidical ideation, anxiety, depression, no desire to take care of herself anymore, feels unworthy  She had also reported on admission that her dad would make remarks to put her down when she was younger, would call her fat and lazy (her sister also reportedly did this)  She was noted to have told EMS that she would cut herself with a butcherknife, she has no where to turn  She admitted to suicidal ideation  Lifestyle   Autonomy She lives alone although she stated that she would like friends, socialization, and is not sure why she does not have these at this time  Reciprocal Relationships she stated that she does not have any current supports   Service to Others she does work parttime    Intrinsic Gratification she stated that in the past she liked horseback riding, but she does not do this currently, in part due to her own fear that she could get hurt but also because her sister has discouraged this  Psychosocial   Psychosocial (WDL) X   Patient Behaviors/Mood Anxious;Brightens with approach; Cooperative;Pleasant;Verbal   Needs Expressed Other (Comment)  (expresed concern for what to tell her job(since in hospital))   Ability to Express Feelings Able to express   Ability to Express Needs Able to express   Ability to Express Thoughts Able to express   Ability to Understand Others Understands   Subjective   Subjective RE: reason for hospitalization: "I'm here because I''m not connected " "I'm alone " "I've been having trouble with motivation to get up, get out of bed  " She also stated that it is the holidays, she has been feeling hopeless  ADL   Additional Comments She feeds herself independently in this environment  She carries out personal care independently  Per most recent daily care documentation she is noted to be independent in hygiene needs  Transfers   Additional Comments She ambulates, transfers independently  Functional Mobility   Additional Comments She is independent in ambulation  Activity Tolerance   Activity Tolerance Patient tolerated treatment well   RUE Assessment   RUE Assessment WFL   LUE Assessment   LUE Assessment WFL   Hand Function   Gross Motor Coordination Functional   Fine Motor Coordination Functional   Vision-Basic Assessment   Current Vision Wears glasses all the time   Cognition   Overall Cognitive Status WFL  (she does report some forgetfulness at times)   Arousal/Participation Alert; Responsive;Arousable; Cooperative   Attention Within functional limits   Orientation Level Oriented X4   Memory Within functional limits   Following Commands Follows multistep commands with increased time or repetition   Comments She was easily able to carry out multistep writing task  She was able to carry out 1-2 step whip stitch task independently and error correct  She was willing to try multistep single cordovan task, she did require repetition, at times assistance for error correction  Her frustration tolerance was at least fair, she did state that she has to practice things to get them right     Assessment   Limitation Mood limitation  (reports decreased motivation, decreased coping skills)   Prognosis Good Assessment Pt is a 76 y o  female seen for OT evaluation s/p admit to [address] [place] [address] on 12/4/2018 w/ Major depression, recurrent ([address] )  Comorbidities affecting pt's functional performance at time of assessment include: malignant neoplasm without specific site, phlebitis, thrombophlebitis of deep vessels of LE's, fibromyalgia, generalized anxiety DO, hyperlipidemia, hypothyroidism, mild cognitive impairment, osteoporosis, tonsil CA, vitamin D deficiency  Personal factors affecting pt at time of IE include:steps to enter environment, limited home support, behavioral pattern, decreased initiation and engagement , financial barriers, health management , environment and decreased coping skills, decreased life management skills  Prior to admission, pt was living in her own home independently  Upon evaluation: Pt requires treatment with consideration of  the following deficits impacting occupational performance: impaired problem solving, impaired interpersonal skills, decreased coping skills and decreased life management skills  Pt to benefit from continued skilled OT tx while in the hospital to address deficits as defined above and maximize level of functional independence w ADL's and functional mobility  Occupational Performance areas to address include: socialization, health maintenance, social participation, jobskills and opportunities for volunteering, and coping skills instruction, life management instruction  From OT standpoint, recommendation at time of d/c would be to return to her own home with community supports when stable  Goals   Patient Goals "I want to get healthy, get on with my life "   LTG Time Frame (30 days)   Long Term Goal #1 Attend/participaten in 2 OT groups offered on the unit to offset admitting behaviors/ symptoms  Long Term Goal #2 Identify and explore stategies to promote improved functioning and wellness  Long Term Goal Identify 3 positive qualities of self   #4Consistently utilize positive coping skills to deal with daily life stressors without resorting to thoughts/ threats of self harm 100% of the time  #5  Consistently utilize positive life management strategies to promote optimum health and wellness 100% of the time  Plan   Treatment Interventions ADL retraining;Continued evaluation; Activityengagement  (coping skills instruction, life management instruction)   Goal Expiration Date 01/06/19   Treatment Day 1   OT Frequency 5x/wk   Priscilla Hoover, OT

## 2018-12-07 NOTE — PROGRESS NOTES
Psychiatry Progress Note    Subjective: Interval History     Patient continues to be isolative and withdrawn  This morning she was seen resting comfortably in bed  Patient states that she is tolerating the transition from Effexor to Zoloft  Patient was somewhat concerned that she does not feel as though Zoloft is immediately working  Patient again was re-educated that Zoloft may take days to weeks to notice an improvement  She continues to report feeling hopeless and helpless at times  She does become needy at times  Her thoughts are somewhat circumstantial and loose throughout the conversation  She does think that her depression is slowly improving however she expected her depression to fully resolve nearly overnight if Zoloft was to work        Current medications:    Current Facility-Administered Medications:     acetaminophen (TYLENOL) tablet 650 mg, 650 mg, Oral, Q6H PRN, Georgia Champion MD, 650 mg at 12/05/18 2005    aluminum-magnesium hydroxide-simethicone (MYLANTA) 200-200-20 mg/5 mL oral suspension 30 mL, 30 mL, Oral, Q4H PRN, Georgia Champion MD    atorvastatin (LIPITOR) tablet 10 mg, 10 mg, Oral, Daily With DinnerPari CRNP    benztropine (COGENTIN) tablet 1 mg, 1 mg, Oral, Q6H PRN, Georgia Champion MD    haloperidol (HALDOL) tablet 2 mg, 2 mg, Oral, Q8H PRN, Georgia Champion MD    haloperidol lactate (HALDOL) injection 2 mg, 2 mg, Intramuscular, Q6H PRN, Georgia Champion MD    hydrOXYzine HCL (ATARAX) tablet 25 mg, 25 mg, Oral, Q6H PRN, Georgia Champion MD, 25 mg at 12/05/18 1212    magnesium hydroxide (MILK OF MAGNESIA) 400 mg/5 mL oral suspension 30 mL, 30 mL, Oral, Daily PRN, Georgia Champion MD    risperiDONE (RisperDAL M-TABS) dispersible tablet 1 mg, 1 mg, Oral, Q8H PRN, Georgia Champion MD    sertraline (ZOLOFT) tablet 50 mg, 50 mg, Oral, Daily, Reshma Iqbal PA-C, 50 mg at 12/07/18 0230    traZODone (DESYREL) tablet 25 mg, 25 mg, Oral, HS PRN, Georgia Champion MD    Current Problem List:    Patient Active Problem List   Diagnosis    Chronic fatigue    Depression    Fibromyalgia    Generalized anxiety disorder    Hyperlipidemia    Hypothyroidism, adult    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[facility] )    Mild cognitive impairment    Osteoporosis    Tonsil cancer ([facility] )    Vitamin D deficiency    Anal lesion    Major depression, recurrent ([facility] )       Problem list reviewed 12/07/18     Objective:     Vital Signs:  Vitals:    12/06/18 0701 12/06/18 1531 12/06/18 1554 12/07/18 0743   BP: 161/72 (!) 160/107 136/82 131/83   BP Location: Right arm Left arm Left arm Right arm   Pulse: 70 78 76 68   Resp: 18 18 17   Temp: 98 3 °F (36 8 °C) 97 5 °F (36 4 °C)  (!) 97 1 °F (36 2 °C)   TempSrc: Temporal Temporal  Temporal   SpO2: 97% 94%  96%   Weight:       Height:             Appearance:  age appropriate and casually dressed   Behavior:  normal   Speech:  soft   Mood:  constricted and depressed   Affect:  constricted   Thought Process:  normal   Thought Content:  normal   Perceptual Disturbances: None   Risk Potential: none   Sensorium:  person, place, situation and time   Cognition:  intact   Consciousness:  alert and awake    Attention: attention span and concentration were age appropriate   Intellect: average   Insight:  fair   Judgment: fair      Motor Activity: no abnormal movements       Behavior over the last 24 hours:  unchanged  Sleep: normal  Appetite: normal  Medication side effects: No  ROS: no complaints      I/O Past 24 hours:  I/O last 3 completed shifts: In: 0 [P O :880]  Out: 1 [Stool:1]  No intake/output data recorded  Labs:  Reviewed 12/07/18    Assessment / Plan:     Major depression, recurrent ([facility] )    Recommended Treatment:      Medication changes:  1) continue current medications    Non-pharmacological treatments  1) Continue with group therapy, milieu therapy and occupational therapy  Safety  1) Safety/communication plan established targeting dynamic risk factors above      2) Risks, benefits, and possible side effects of medications explained to patient and patient verbalizes understanding  Counseling / Coordination of Care    Total floor / unit time spent today 20 minutes  Greater than 50% of total time was spent with the patient and / or family counseling and / or coordination of care  A description of the counseling / coordination of care  Patient's Rights, confidentiality and exceptions to confidentiality, use of automated medical record, [facility] staff access to medical record, and consent to treatment reviewed      Kasia Payne PA-C

## 2018-12-07 NOTE — PLAN OF CARE
Problem: OCCUPATIONAL THERAPY ADULT  Goal: Performs self-care activities at highest level of function for planned discharge setting  See evaluation for individualized goals  Treatment Interventions: ADL retraining, Continued evaluation, Activityengagement (coping skills instruction, life management instruction)          See flowsheet documentation for full assessment, interventions and recommendations  Outcome: Progressing  Limitation: Mood limitation (reports decreased motivation, decreased coping skills)  Prognosis: Good  Assessment: Adithya Arias was present, engaged for the full OT Life Management session  She was attentive to stress management bingo activity  She explored with the group types of stressors, symptoms, and relievers  She marked her card accordingly  She was pleasant, insightful, often able to explain select phrases to the group  She was well-invested in group activity  Progress has been consistent towards her goals  Her efforts and contributions were commended

## 2018-12-07 NOTE — PLAN OF CARE
Problem: OCCUPATIONAL THERAPY ADULT  Goal: Performs self-care activities at highest level of function for planned discharge setting  See evaluation for individualized goals  Treatment Interventions: ADL retraining, Continued evaluation, Activityengagement (coping skills instruction, life management instruction)          See flowsheet documentation for full assessment, interventions and recommendations  Outcome: Progressing  Limitation: Mood limitation (reports decreased motivation, decreased coping skills)  Prognosis: Good  Assessment: Pt is a 76 y o  female seen for OT evaluation s/p admit to [facility] on 12/4/2018 w/ Major depression, recurrent ([facility] )  Comorbidities affecting pt's functional performance at time of assessment include: malignant neoplasm without specific site, phlebitis, thrombophlebitis of deep vessels of LE's, fibromyalgia, generalized anxiety DO, hyperlipidemia, hypothyroidism, mild cognitive impairment, osteoporosis, tonsil CA, vitamin D deficiency  Personal factors affecting pt at time of IE include:steps to enter environment, limited home support, behavioral pattern, decreased initiation and engagement , financial barriers, health management , environment and decreased coping skills, decreased life management skills  Prior to admission, pt was living in her own home independently  Upon evaluation: Pt requires treatment with consideration of  the following deficits impacting occupational performance: impaired problem solving, impaired interpersonal skills, decreased coping skills and decreased life management skills  Pt to benefit from continued skilled OT tx while in the hospital to address deficits as defined above and maximize level of functional independence w ADL's and functional mobility   Occupational Performance areas to address include: socialization, health maintenance, social participation, jobskills and opportunities for volunteering, and coping skills instruction, life management instruction  From OT standpoint, recommendation at time of d/c would be to return to her own home with community supports when stable

## 2018-12-07 NOTE — PLAN OF CARE
Problem: DISCHARGE PLANNING  Goal: Discharge to home or other facility with appropriate resources  INTERVENTIONS:  - Identify barriers to discharge w/patient and caregiver  - Arrange for needed discharge resources and transportation as appropriate  - Identify discharge learning needs (meds, wound care, etc )  - Arrange for interpretive services to assist at discharge as needed  - Refer to Case Management Department for coordinating discharge planning if the patient needs post-hospital services based on physician/advanced practitioner order or complex needs related to functional status, cognitive ability, or social support system   Outcome: Progressing  D/C Plan: Patient continues to be depressed and anxious, improve mood in order to discharge home

## 2018-12-08 RX ADMIN — SERTRALINE HYDROCHLORIDE 50 MG: 50 TABLET ORAL at 08:25

## 2018-12-08 NOTE — PROGRESS NOTES
Patient visible in the small tv room  Patient is pleasant, has been interactive with staff and peers and cooperative  She is hyper verbal  Patient denies any needs at this time  Will continue to monitor on q 7 minute checks

## 2018-12-08 NOTE — PROGRESS NOTES
Psychiatry Progress Note    Subjective: Interval History     Patient reports that she is tolerating Zoloft with no adverse effects  Patient reports she feels that the medication is more effective for her then Effexor was  Patient reports that she feels her mood is improving and that she is looking forward to her life now  Patient reports that she also feels the medication is helping to improve her memory  Patient denying any suicidal ideations or homicidal ideations  Patient medication and meal compliant  Patient slept at long intervals last evening      Behavior over the last 24 hours:  unchanged  Sleep: normal  Appetite: normal  Medication side effects: No  ROS: no complaints    Current medications:    Current Facility-Administered Medications:     acetaminophen (TYLENOL) tablet 650 mg, 650 mg, Oral, Q6H PRN, Miguelina Arambula MD, 650 mg at 12/05/18 2005    aluminum-magnesium hydroxide-simethicone (MYLANTA) 200-200-20 mg/5 mL oral suspension 30 mL, 30 mL, Oral, Q4H PRN, Miguelina Arambula MD    atorvastatin (LIPITOR) tablet 10 mg, 10 mg, Oral, Daily With Dinner, JOYCE Adame    benztropine (COGENTIN) tablet 1 mg, 1 mg, Oral, Q6H PRN, Miguelina Arambula MD    haloperidol (HALDOL) tablet 2 mg, 2 mg, Oral, Q8H PRN, Miguelina Arambula MD    haloperidol lactate (HALDOL) injection 2 mg, 2 mg, Intramuscular, Q6H PRN, Miguelina Arambula MD    hydrOXYzine HCL (ATARAX) tablet 25 mg, 25 mg, Oral, Q6H PRN, Miguelina Arambula MD, 25 mg at 12/05/18 1212    magnesium hydroxide (MILK OF MAGNESIA) 400 mg/5 mL oral suspension 30 mL, 30 mL, Oral, Daily PRN, Miguelina Arambula MD    risperiDONE (RisperDAL M-TABS) dispersible tablet 1 mg, 1 mg, Oral, Q8H PRN, Miguelina Arambula MD    sertraline (ZOLOFT) tablet 50 mg, 50 mg, Oral, Daily, Reshma Iqbal PA-C, 50 mg at 12/07/18 3249    traZODone (DESYREL) tablet 25 mg, 25 mg, Oral, HS PRN, Miguelina Arambula MD    Current Problem List:    Patient Active Problem List   Diagnosis    Chronic fatigue    Depression    Fibromyalgia    Generalized anxiety disorder    Hyperlipidemia    Hypothyroidism, adult    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HCC)    Mild cognitive impairment    Osteoporosis    [facility]    Vitamin D deficiency    Anal lesion    [facility]       Problem list reviewed 12/08/18     Objective:     Vital Signs:  Vitals:    12/06/18 1531 12/06/18 1554 12/07/18 0743 12/07/18 1628   BP: (!) 160/107 136/82 131/83 125/76   BP Location: Left arm Left arm Right arm Left arm   Pulse: 78 76 68 88   Resp: 18 17 18   Temp: 97 5 °F (36 4 °C)  (!) 97 1 °F (36 2 °C) (!) 96 8 °F (36 °C)   TempSrc: Temporal  Temporal Tympanic   SpO2: 94%  96% 98%   Weight:       Height:             Appearance:  age appropriate and casually dressed   Behavior:  normal   Speech:  normal volume   Mood:  constricted   Affect:  constricted   Thought Process:  normal   Thought Content:  normal   Perceptual Disturbances: None   Risk Potential: none   Sensorium:  person, place, situation and time   Cognition:  intact   Consciousness:  alert and awake    Attention: attention span and concentration were age appropriate   Intellect: average   Insight:  limited   Judgment: limited      Motor Activity: no abnormal movements       I/O Past 24 hours:  I/O last 3 completed shifts: In: 2200 [P O :2200]  Out: 1 [Stool:1]  No intake/output data recorded  Labs:  Reviewed 12/08/18    Progress Toward Goals:  unchanged    Assessment / Plan:     Major depression, recurrent ([facility] )    Recommended Treatment:      Medication changes:  1) continue current medication regimen  Non-pharmacological treatments  1) Continue with group therapy, milieu therapy and occupational therapy  Safety  1) Safety/communication plan established targeting dynamic risk factors above  2) Risks, benefits, and possible side effects of medications explained to patient and patient verbalizes understanding        Counseling / Coordination of Care    Total floor / unit time spent today 20 minutes  Greater than 50% of total time was spent with the patient and / or family counseling and / or coordination of care  A description of the counseling / coordination of care  Patient's Rights, confidentiality and exceptions to confidentiality, use of automated medical record, [facility] staff access to medical record, and consent to treatment reviewed      Princess Parrish PA-C

## 2018-12-08 NOTE — PROGRESS NOTES
Pt is visible on unit  This morning while flossing, pt's crown came out  RN paged dental resident  Pt called her dentist and dentist told pt to put crown back in using polygrip  Pt is hyperverbal and intrusive, but pleasant  Med and meal compliant  Will continue to encourage group participation for a healthy recovery

## 2018-12-09 RX ADMIN — SERTRALINE HYDROCHLORIDE 50 MG: 50 TABLET ORAL at 08:28

## 2018-12-09 NOTE — PROGRESS NOTES
Psychiatry Progress Note    Subjective: Interval History     Patient is somewhat hyperverbal this morning  Patient continuing to report that she is noticing a positive response to Zoloft  Continues to report that she feels her mood is improving and is optimistic about the future  Somewhat elated  Patient again today expressing content that she feels her memory has been improving  Patient does endorse improvement in her focus and concentration which could be causing resulting not improvement and memory  Staff does report that patient has been hyperverbal throughout the weekend and intrusive at times  Patient however pleasant has been able to be redirected  Patient denies suicidal ideations, homicidal ideations  Patient with no mood lability or psychosis  Patient slept well last evening  Report indicates no history of bipolar disorder  Will continue to rule out any underlying initiation of hypomania      Behavior over the last 24 hours:  unchanged  Sleep: normal  Appetite: normal  Medication side effects: No  ROS: no complaints    Current medications:    Current Facility-Administered Medications:     acetaminophen (TYLENOL) tablet 650 mg, 650 mg, Oral, Q6H PRN, Libertad Garcia MD, 650 mg at 12/05/18 2005    aluminum-magnesium hydroxide-simethicone (MYLANTA) 200-200-20 mg/5 mL oral suspension 30 mL, 30 mL, Oral, Q4H PRN, Libertad Garcia MD    atorvastatin (LIPITOR) tablet 10 mg, 10 mg, Oral, Daily With Dinner, [facility]    benztropine (COGENTIN) tablet 1 mg, 1 mg, Oral, Q6H PRN, Libertad Garcia MD    haloperidol (HALDOL) tablet 2 mg, 2 mg, Oral, Q8H PRN, Libertad Garcia MD    haloperidol lactate (HALDOL) injection 2 mg, 2 mg, Intramuscular, Q6H PRN, Libertad Garcia MD    hydrOXYzine HCL (ATARAX) tablet 25 mg, 25 mg, Oral, Q6H PRN, Libertad Garcia MD, 25 mg at 12/05/18 1212    magnesium hydroxide (MILK OF MAGNESIA) 400 mg/5 mL oral suspension 30 mL, 30 mL, Oral, Daily PRN, Libertad Garcia MD    risperiDONE (RisperDAL M-TABS) dispersible tablet 1 mg, 1 mg, Oral, Q8H PRN, Isa Knapp MD    sertraline (ZOLOFT) tablet 50 mg, 50 mg, Oral, Daily, Rolando Mercado PA-C, 50 mg at 12/08/18 0825    traZODone (DESYREL) tablet 25 mg, 25 mg, Oral, HS PRN, Isa Knapp MD    Current Problem List:    Patient Active Problem List   Diagnosis    Chronic fatigue    Depression    Fibromyalgia    Generalized anxiety disorder    Hyperlipidemia    Hypothyroidism, adult    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[facility] )    Mild cognitive impairment    Osteoporosis    Tonsil cancer ([facility] )    Vitamin D deficiency    Anal lesion    Major depression, recurrent ([facility] )       Problem list reviewed 12/09/18     Objective:     Vital Signs:  Vitals:    12/07/18 1628 12/08/18 0716 12/08/18 1550 12/09/18 0703   BP: 125/76 167/87 117/73 144/76   BP Location: Left arm Right arm Right arm Right arm   Pulse: 88 61 63 81   Resp: 18 16 18 16   Temp: (!) 96 8 °F (36 °C) 97 6 °F (36 4 °C) (!) 97 °F (36 1 °C) 98 2 °F (36 8 °C)   TempSrc: Tympanic Temporal Temporal Temporal   SpO2: 98% 98% 97% 98%   Weight:       Height:             Appearance:  age appropriate and casually dressed   Behavior:  normal   Speech:  normal volume   Mood:  constricted   Affect:  constricted   Thought Process:  normal   Thought Content:  normal   Perceptual Disturbances: None   Risk Potential: none   Sensorium:  person, place, situation and time   Cognition:  intact   Consciousness:  alert and awake    Attention: attention span and concentration were age appropriate   Intellect: average   Insight:  limited   Judgment: limited      Motor Activity: no abnormal movements       I/O Past 24 hours:  I/O last 3 completed shifts: In: 720 [P O :720]  Out: 1 [Stool:1]  No intake/output data recorded          Labs:  Reviewed 12/09/18    Progress Toward Goals:  unchanged    Assessment / Plan:     Major depression, recurrent ([facility] )    Recommended Treatment:      Medication changes:  1) continue current medication regimen  Non-pharmacological treatments  1) Continue with group therapy, milieu therapy and occupational therapy  Safety  1) Safety/communication plan established targeting dynamic risk factors above  2) Risks, benefits, and possible side effects of medications explained to patient and patient verbalizes understanding  Counseling / Coordination of Care    Total floor / unit time spent today 20 minutes  Greater than 50% of total time was spent with the patient and / or family counseling and / or coordination of care  A description of the counseling / coordination of care  Patient's Rights, confidentiality and exceptions to confidentiality, use of automated medical record, [facility]bin staff access to medical record, and consent to treatment reviewed      Velma Ramirez PA-C

## 2018-12-09 NOTE — PROGRESS NOTES
Pt is visible on unit, but has been more isolative than yesterday  Pt not as talkative  Was fixated on [employer] and communion earlier this morning  Pt asked when "the nun is going to come up for bible study?"  When asked what she was referring to, pt states "I was told that a nun comes every Sunday for bible study "  RN informed pt that  would probably be up for communion, but unit did not have "bible study"  Pt is eating only small percentage of meals because she states "I'm sure you're aware of my tooth falling out yesterday  I can't eat without it in "  Pt's diet was changed to dental soft yesterday by RN, but even with this change, pt states "I'm going to choke "  Will continue to encourage socialization with peers for a healthy recovery

## 2018-12-09 NOTE — PROGRESS NOTES
Pt was  A  bit emotional last night  She said she is having a problem by trying to control situation and worries a lot about other people  Pt was pleasant and cooperative  She denied pain, SI, refused to take any PRN meds  She appeared to be sleeping all night

## 2018-12-10 ENCOUNTER — TELEPHONE (OUTPATIENT)
Dept: FAMILY MEDICINE CLINIC | Facility: CLINIC | Age: 68
End: 2018-12-10

## 2018-12-10 PROCEDURE — 97150 GROUP THERAPEUTIC PROCEDURES: CPT

## 2018-12-10 RX ORDER — ATORVASTATIN CALCIUM 10 MG/1
10 TABLET, FILM COATED ORAL
Qty: 30 TABLET | Refills: 0 | Status: SHIPPED | OUTPATIENT
Start: 2018-12-10 | End: 2019-02-05 | Stop reason: ALTCHOICE

## 2018-12-10 RX ADMIN — SERTRALINE HYDROCHLORIDE 50 MG: 50 TABLET ORAL at 08:40

## 2018-12-10 NOTE — OCCUPATIONAL THERAPY NOTE
Occupational Therapy Group Treatment Note      Erendira Julien    12/10/2018    Patient Active Problem List   Diagnosis    Chronic fatigue    Depression    Fibromyalgia    Generalized anxiety disorder    Hyperlipidemia    Hypothyroidism, adult    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    Mild cognitive impairment    Osteoporosis    Tonsil cancer ([facility] )    Vitamin D deficiency    Anal lesion    Major depression, recurrent ([facility] )       Past Medical History:   Diagnosis Date    Fall     last assessed - [id number]    Impacted cerumen of left ear     last assessed - [id number]    Malignant neoplasm without specification of site [facility]) 06/2004    Phlebitis and thrombophlebitis of deep vessels of lower extremities, unspecified laterality ([facility] )        Past Surgical History:   Procedure Laterality Date    CYSTOSCOPY      Diagnostic    GASTROJEJUNOSTOMY      Percutaneous    LAPAROSCOPIC APPENDECTOMY      OTHER SURGICAL HISTORY      1)Biopsy oropharynx tonsillar fossa; 2)Chemotherapy Administration with chemoport for tonsillar cancer    TONSILLECTOMY      VENA CAVA RECONSTRUCTION      Inferior vena cava        12/10/18 1125   Assessment   Assessment Deon Shearer was present, alert for the full session  She freely engaged in morning stretch exercises, and she stated that that she does regularly participate in various exercises and walks  She was friendly, initiating  She engaged in current events discussion and this day in history  She did freely verbalize her needs and assert herself, stating that she does not like things such as the site of blood or even things like pictures of prosthesis  She was pleasant and engaged throughout the session  Progress has been consistent towards her goals  Plan   Treatment Interventions ADL retraining;Continued evaluation; Activityengagement  (coping skills instruction, life management instruction)   Goal Expiration Date 01/06/19   Treatment Day 4   OT Frequency 5x/wk   Albina Olivarez OT

## 2018-12-10 NOTE — PROGRESS NOTES
Pt was in a great mood last night, was energetic, involved in activities and conversations with peers  She denied pain and did not want to take any PRN medications  It seemed she was sleeping well all night, no distress noted

## 2018-12-10 NOTE — PROGRESS NOTES
Pt anxious but pleasant and med-compliant  Good appetite and steady gait  VSS  Brighter affect  Monitored for safety and support

## 2018-12-10 NOTE — OCCUPATIONAL THERAPY NOTE
Occupational Therapy Group Treatment Note      Andrea Commander    12/10/2018    Patient Active Problem List   Diagnosis    Chronic fatigue    Depression    Fibromyalgia    Generalized anxiety disorder    Hyperlipidemia    Hypothyroidism, adult    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[facility]    Mild cognitive impairment    Osteoporosis    Tonsil cancer ([facility]    Vitamin D deficiency    Anal lesion    Major depression, recurrent ([facility] )       Past Medical History:   Diagnosis Date    Fall     last assessed - [id number]    Impacted cerumen of left ear     last assessed - [id number]    Malignant neoplasm without specification of site [facility]) 06/2004    Phlebitis and thrombophlebitis of deep vessels of lower extremities, unspecified laterality ([facility] )        Past Surgical History:   Procedure Laterality Date    CYSTOSCOPY      Diagnostic    GASTROJEJUNOSTOMY      Percutaneous    LAPAROSCOPIC APPENDECTOMY      OTHER SURGICAL HISTORY      1)Biopsy oropharynx tonsillar fossa; 2)Chemotherapy Administration with chemoport for tonsillar cancer    TONSILLECTOMY      VENA CAVA RECONSTRUCTION      Inferior vena cava        12/10/18 1010   Assessment   Assessment Freddie Ibrahim was present, alert, engaged for the full OT Life management session  She was provided with handouts on ROWDY and Warmline, and these community support services were reviewed with her  She was pleasant, initiating  She asked relevant questions  She did engage in group discussion activity, "managing Your Mental Health " She discussed stress, goals, and explored hypothetical situations  She stated that she would not discontinue her medication, she knows that she needs this  She was encouraging to other group members and supportive of group process  Progress has been consistent towards her goals  Her efforts and group contributions have been commended  Plan   Treatment Interventions ADL retraining;Continued evaluation; Activityengagement  (coping skills instruction, life management instruction)   Goal Expiration Date 01/06/18   Treatment Day 4   OT Frequency 5x/wk   Dejuan Flores OT

## 2018-12-10 NOTE — PLAN OF CARE
Problem: OCCUPATIONAL THERAPY ADULT  Goal: Performs self-care activities at highest level of function for planned discharge setting  See evaluation for individualized goals  Treatment Interventions: ADL retraining, Continued evaluation, Activityengagement (coping skills instruction, life management instruction)          See flowsheet documentation for full assessment, interventions and recommendations  Outcome: Progressing  Limitation: Mood limitation (reports decreased motivation, decreased coping skills)  Prognosis: Good  Assessment: Bill Rojas was present, alert for the full session  She freely engaged in morning stretch exercises, and she stated that that she does regularly participate in various exercises and walks  She was friendly, initiating  She engaged in current events discussion and this day in history  She did freely verbalize her needs and assert herself, stating that she does not like things such as the site of blood or even things like pictures of prosthesis  She was pleasant and engaged throughout the session  Progress has been consistent towards her goals

## 2018-12-10 NOTE — SOCIAL WORK
Patient has improved and feels like she is ready to go home tomorrow  Writer explained that a therapist and psychiatrist were set up for her and patient was happy  Patient continues to be anxious about leaving as she feels like she finally found a group of people to talk with and now she is leaving  Patient is medication and meal compliant  Writer spoke with Lakia Guerrero, massage therapist, who stated that her car is still at their facility and it is safe  SW will continue to follow and provide services as needed

## 2018-12-10 NOTE — PROGRESS NOTES
Psychiatry Progress Note    Subjective: Interval History     The patient is walking around the unit this morning  She states she is feeling better on the Zoloft  She feels she has more energy and her depression is improving  She states she slept well  She is medication and meal compliant and tolerating them well  Patient seems to be hyperverbal at times  Patient continues to deny any history of manic episodes  She denies mood swings  She denies suicidal ideation  She offers no other concerns today      Current medications:    Current Facility-Administered Medications:     acetaminophen (TYLENOL) tablet 650 mg, 650 mg, Oral, Q6H PRN, Ozzie Loera MD, 650 mg at 12/05/18 2005    aluminum-magnesium hydroxide-simethicone (MYLANTA) 200-200-20 mg/5 mL oral suspension 30 mL, 30 mL, Oral, Q4H PRN, Ozzie Loera MD    atorvastatin (LIPITOR) tablet 10 mg, 10 mg, Oral, Daily With Dinner, Jason Leyden Ciminieri, CRNP    benztropine (COGENTIN) tablet 1 mg, 1 mg, Oral, Q6H PRN, Ozzie Loera MD    haloperidol (HALDOL) tablet 2 mg, 2 mg, Oral, Q8H PRN, Ozzie Loera MD    haloperidol lactate (HALDOL) injection 2 mg, 2 mg, Intramuscular, Q6H PRN, Ozzie Loera MD    hydrOXYzine HCL (ATARAX) tablet 25 mg, 25 mg, Oral, Q6H PRN, Ozzie Loera MD, 25 mg at 12/05/18 1212    magnesium hydroxide (MILK OF MAGNESIA) 400 mg/5 mL oral suspension 30 mL, 30 mL, Oral, Daily PRN, Ozzie Loera MD    risperiDONE (RisperDAL M-TABS) dispersible tablet 1 mg, 1 mg, Oral, Q8H PRN, Ozzie Loera MD    sertraline (ZOLOFT) tablet 50 mg, 50 mg, Oral, Daily, Reshma Iqbal PA-C, 50 mg at 12/10/18 0840    traZODone (DESYREL) tablet 25 mg, 25 mg, Oral, HS PRN, Ozzie Loera MD    Current Problem List:    Patient Active Problem List   Diagnosis    Chronic fatigue    Depression    Fibromyalgia    Generalized anxiety disorder    Hyperlipidemia    Hypothyroidism, adult    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[facility] [address] [address] )    Mild cognitive impairment    Osteoporosis    Tonsil cancer ([facility] )    Vitamin D deficiency    Anal lesion    Major depression, recurrent ([facility] )       Problem list reviewed 12/10/18     Objective:     Vital Signs:  Vitals:    12/08/18 1550 12/09/18 0703 12/09/18 1526 12/10/18 0655   BP: 117/73 144/76 151/88 164/86   BP Location: Right arm Right arm Left arm Right arm   Pulse: 63 81 93 68   Resp: 18 16 18 18   Temp: (!) 97 °F (36 1 °C) 98 2 °F (36 8 °C) (!) 97 °F (36 1 °C) 97 5 °F (36 4 °C)   TempSrc: Temporal Temporal Temporal Temporal   SpO2: 97% 98% 99% 98%   Weight:       Height:             Appearance:  age appropriate and casually dressed   Behavior:  normal   Speech:  soft   Mood:  anxious   Affect:  constricted   Thought Process:  normal   Thought Content:  normal   Perceptual Disturbances: None   Risk Potential: none   Sensorium:  person, place, situation and time   Cognition:  intact   Consciousness:  alert and awake    Attention: attention span and concentration were age appropriate   Intellect: average   Insight:  fair   Judgment: fair      Motor Activity: no abnormal movements       Behavior over the last 24 hours:  unchanged  Sleep: normal  Appetite: normal  Medication side effects: No  ROS: no complaints      I/O Past 24 hours:  I/O last 3 completed shifts: In: 1140 [P O :1140]  Out: -   I/O this shift:  In: 300 [P O :300]  Out: -         Labs:  Reviewed 12/10/18    Assessment / Plan:     Major depression, recurrent ([place] [address] )    Recommended Treatment:      Medication changes:  1) continue current medication regimen  Non-pharmacological treatments  1) Continue with group therapy, milieu therapy and occupational therapy  Safety  1) Safety/communication plan established targeting dynamic risk factors above  2) Risks, benefits, and possible side effects of medications explained to patient and patient verbalizes understanding  Counseling / Coordination of Care    Total floor / unit time spent today 20 minutes   Greater than 50% of total time was spent with the patient and / or family counseling and / or coordination of care  A description of the counseling / coordination of care  Patient's Rights, confidentiality and exceptions to confidentiality, use of automated medical record, [facility] staff access to medical record, and consent to treatment reviewed      Steve Goodwin PA-C

## 2018-12-10 NOTE — PROGRESS NOTES
Patient is alert and oriented x 4, Visible on the unit  Interacts well with peers  States " I had much fun here than I had in my last 10 years of my life"Is meals and medication compliant with exception for Lipitor because her PCP is trying a natural approach for lowering cholesterol  Denies SI and hallucinations  Will keep monitoring for support and safety

## 2018-12-10 NOTE — TELEPHONE ENCOUNTER
[facility] called to schedule a hospital discharge appt for pt  She is being discharged 12/11/2018   She scheduled for 12/28/2018 at 12:30pm with Dr Priscilla Rao

## 2018-12-10 NOTE — PLAN OF CARE
Problem: OCCUPATIONAL THERAPY ADULT  Goal: Performs self-care activities at highest level of function for planned discharge setting  See evaluation for individualized goals  Treatment Interventions: ADL retraining, Continued evaluation, Activityengagement (coping skills instruction, life management instruction)          See flowsheet documentation for full assessment, interventions and recommendations  Outcome: Progressing  Limitation: Mood limitation (reports decreased motivation, decreased coping skills)  Prognosis: Good  Assessment: Boby Parrish was present, alert, engaged for the full OT Life management session  She was provided with handouts on ROWDY and Warmline, and these community support services were reviewed with her  She was pleasant, initiating  She asked relevant questions  She did engage in group discussion activity, "managing Your Mental Health " She discussed stress, goals, and explored hypothetical situations  She stated that she would not discontinue her medication, she knows that she needs this  She was encouraging to other group members and supportive of group process  Progress has been consistent towards her goals  Her efforts and group contributions have been commended

## 2018-12-11 VITALS
DIASTOLIC BLOOD PRESSURE: 70 MMHG | SYSTOLIC BLOOD PRESSURE: 120 MMHG | OXYGEN SATURATION: 96 % | HEART RATE: 66 BPM | BODY MASS INDEX: 26.38 KG/M2 | HEIGHT: 64 IN | TEMPERATURE: 98 F | RESPIRATION RATE: 18 BRPM | WEIGHT: 154.54 LBS

## 2018-12-11 PROCEDURE — 97150 GROUP THERAPEUTIC PROCEDURES: CPT

## 2018-12-11 RX ADMIN — HYDROXYZINE HYDROCHLORIDE 25 MG: 25 TABLET ORAL at 12:21

## 2018-12-11 RX ADMIN — SERTRALINE HYDROCHLORIDE 50 MG: 50 TABLET ORAL at 08:40

## 2018-12-11 NOTE — NURSING NOTE
Pt optimistic and cooperative with d/c process  Pt expressed understanding of d/c meds and f/u instructions  Pt escorted to lobby by staff via wc with all her belongings, for  at 1400 by   Monitored for safety and support

## 2018-12-11 NOTE — SOCIAL WORK
Patient is being discharged, no SI/HI, no psychosis or A/V  Patient is in agreement with discharge  No questions verbalized  Patient provided with after care appointment, discharge instructions and prescriptions  IMM, core measures, transition of care, discharge instructions, and treatment plan complete  Pt will be picked up by [employer] for  discharge home  SW will continue to follow up as needed

## 2018-12-11 NOTE — DISCHARGE SUMMARY
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[facility] )    Mild cognitive impairment    Osteoporosis    Overview Signed 3/15/2018  3:40 PM by Pauline Tam DO     Transitioned From: Osteopenia         Tonsil cancer ([place] [address] )    Vitamin D deficiency    Anal lesion    Overview Signed 5/15/2018  2:35 PM by Pauline Tam DO     Overview:   Added automatically from request for surgery [id number]               HPI:     Radha Davidson is an 76 y o , female, admitted to the psychiatric unit under a 201 voluntary status to Dr Romina Del Cid' service with the chief complaint of increased anxiety and depression  The patient reports she has no desire to take care of herself anymore and feels unworthy  She reports her dad would make remarks to put her down when she was younger and would call her fat and lazy her sister has been doing it as well  She does not have any support it  She stated to EMS that she would cut herself with a  knife because she has no where to turn  She admits to having suicidal ideation with a plan to cut her wrist prior to admission  She lives alone and feels that the holidays are very difficult  She says she does not take care of her home  She does not like to be or walk as before  She is hopeless and helpless and feels very lonely  The patient was admitted to the behavioral health unit once medically cleared      The patient is alert and oriented x4  Pt states she has been on Effexor for approximately 20 years  She has been up to 225 mg extended release  She states it feels that this has stopped working   She has also taken Wellbutrin in the past which she states did not work for her  Patient has lost desire to eat over the last couple weeks and states she is eating some ice cream   She states she lives alone and has been giving up and not taking medications  She is taking her Effexor sporadically  She states she is self-critical   She lacks motivation and has not been cleaning her house or taking care of herself  She states even on the Effexor she still felt sad  Patient has very poor support system  She states she does not have any friends or family that she talks too  Her one sister is very critical of her  Patient states both of her parents have passed away  She denies a history of mood swings or hallucinations  She has been having passive suicidal ideation a lot lately  She denies any history of suicide attempts or plans  Patient states she has been sleeping most of the day and does not have energy to accomplish anything  Patient states she is willing to try other medication for her depression  Brief Hospital Course:     After the patient was admitted to the psychiatric unit  the patient had psychotropic medication adjustments made to help stabilize their mood  Patients Effexor was discontinued and patient was placed on Zoloft  Following this medication change, the patient started to stabilize  Finally on 12/11/2018, the patient was found to be stable for discharge  On the day of discharge the patient reported their symptoms as significantly improved  All psychiatric medications were being tolerated without side effect or adverse event  No suicidal or homicidal ideations were present  The patient expressed their readiness and willingness to be discharged and referral to outpatient treatment was made  The case was discussed with Dr Teri Cano and he has deemed the patient stable for discharge        Condition on discharge:     Improved    Medications Upon Discharge:       atorvastatin (LIPITOR) tablet 10 mg, 10 mg, Oral, Daily With Marcia Rouse sertraline (ZOLOFT) tablet 50 mg, 50 mg, Oral, Daily, Marie Lozano PA-C, 50 mg at 12/10/18 0840    CARLOS RodriguezC

## 2018-12-11 NOTE — DISCHARGE INSTR - ACTIVITY
Independent ambulation  PROGRESS NOTE    DATE: 7/3/2017    SUBJECTIVE:   Mario Pa is a 50 year old male who was admitted on 7/1/2017 with continued right scrotal abscess. Began about 2 weeks prior to admission, was drained in the ER on 6/23. Cultures are growing group B strep and actinomyces. Admitted because of increasing pain.    Had drainage overnight. Very significant complaints of pain.    CURRENT MEDICATIONS:  • pencillin G potassium IVPB  4 Million Units Intravenous Q6H   • insulin glargine  40 Units Subcutaneous 2 times per day   • sodium chloride (PF)  2 mL Injection 2 times per day   • enoxaparin (LOVENOX) injection  40 mg Subcutaneous Daily   • pregabalin  150 mg Oral 2 times per day   • ondansetron  4 mg Oral Daily   • clonazePAM  0.5 mg Oral Nightly   • buPROPion  300 mg Oral Daily   • baclofen  10 mg Oral TID   • pantoprazole  40 mg Oral QAM AC   • busPIRone  5 mg Oral TID AC   • insulin lispro   Subcutaneous TID AC   • lisinopril  10 mg Oral Daily   • tamsulosin  0.4 mg Oral Q Evening   • atorvastatin  40 mg Oral Daily   • tiotropium  18 mcg Inhalation Daily Resp   • traZODone  50 mg Oral Nightly   • insulin lispro  10 Units Subcutaneous TID AC   • VANCOMYCIN - PHARMACIST MONITORED   Does not apply See Admin Instructions   • vancomycin (VANCOCIN) IVPB  1,500 mg Intravenous 2 times per day         OBJECTIVE:  Visit Vitals  /76   Pulse 90   Temp 97.7 °F (36.5 °C) (Oral)   Resp 18   Ht 5' 7\" (1.702 m)   Wt 113.9 kg   SpO2 95%   BMI 39.31 kg/m²              I/O last 3 completed shifts:  In: 1351.6 [P.O.:580; I.V.:771.6]  Out: 800 [Urine:700; Emesis:100]    PHYSICAL EXAM  General - Alert and awake. No acute distress until scrotum is manipulated  HEENT -Sclerae anicteric. Lips are moist. Tongue and buccal mucosa moist. Neck is supple. No jugular venous distension.  Cardiovascular - Regular rate and rhythm. Normal S1, S2.  No gallops. No murmurs.  Pulmonary -Adequate respiratory effort. Lungs are clear to auscultation  bilaterally. No wheezes. No crackles.  Abdomen - Soft.  No tenderness. No distention. Bowel sounds are normoactive.  Extremities - Warm and well perfused. No cyanosis or edema. Moves all four extremities.  Skin- malodorous drainage right scrotum, exquisite tenderness  Neuro - Alert and oriented to person, place and time. No focal deficits.    LABS:      Recent Labs  Lab 07/02/17  0602 07/01/17  2130 06/26/17  1238   SODIUM  --  137  --    POTASSIUM  --  4.0  --    CHLORIDE  --  102  --    CO2  --  26  --    BUN  --  18  --    CREATININE 0.71 0.64*  --    GLUCOSE  --  290*  --    ALBUMIN  --  3.5*  --    AST  --  19  --    BILIRUBIN  --  0.3  --    TSH  --   --  2.291         Recent Labs  Lab 07/01/17  2130   WBC 9.8   HGB 12.5*   HCT 36.1*      MCV 83.8       ASSESSMENT and PLAN:  #1. Right scrotal abscess, apparently growing actinomyces. Will change antibiotics to IV penicillin and ask infectious disease to see. Will probably undergo debridement today.  #2. Poorly controlled diabetes, acute and chronic. Will adjust according to blood sugars.    Discussed with Dr. Valverde, patient. Patient with many questions        Indra Dyer MD   ATTENDING PHYSICIAN   7/3/2017  8:56 AM

## 2018-12-11 NOTE — PROGRESS NOTES
Pt with good appetite and steady gait  Med-compliant  Constricted but pleasant with select peer interactions  VSS  Using atarax po prn with positive effect  Monitored for safety and support

## 2018-12-11 NOTE — PLAN OF CARE

## 2018-12-11 NOTE — DISCHARGE INSTR - OTHER ORDERS
[facility] ([facility]) AT [place] [facility] - licensed telephone and mobile crisis services that provide mental health assessments to all age groups regardless of income or insurance  [facility] operates 24-hour/7 days a week  [address] [place] [address] assists consumer who are experiencing a mental health emergency and lack the resources to assist themselves  Immediate intervention for suicidal and depressed individuals with home visits/outreach being top priority  Crisis staff assists consumer in [facility] including placement in [address] [place], [place] [address]robin can be contacted at [phone]

## 2018-12-11 NOTE — OCCUPATIONAL THERAPY NOTE
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[address] )    Mild cognitive impairment    Osteoporosis    Tonsil cancer ([address] )    Vitamin D deficiency    Anal lesion    Major depression, recurrent ([address] )       Past Medical History:   Diagnosis Date    Fall     last assessed - 14Nov2012    Impacted cerumen of left ear     last assessed - [id number]    Malignant neoplasm without specification of site [facility]) 06/2004    Phlebitis and thrombophlebitis of deep vessels of lower extremities, unspecified laterality ([address] )        Past Surgical History:   Procedure Laterality Date    CYSTOSCOPY      Diagnostic    GASTROJEJUNOSTOMY      Percutaneous    LAPAROSCOPIC APPENDECTOMY      OTHER SURGICAL HISTORY      1)Biopsy oropharynx tonsillar fossa; 2)Chemotherapy Administration with chemoport for tonsillar cancer    TONSILLECTOMY      VENA CAVA RECONSTRUCTION      Inferior vena cava        12/11/18 1010   Assessment   Assessment Primus Free was present for the second half of this morning OT Life Management session  She had earlier been meeting with other health care worker as she prepared for her upcoming discharge  She appeared to be in good spirits when she joined the program  She was provided with a handout on famous persons who had dealt with major mental health diagnosis  She was pleasant, she contributed to discussion on ways to promote maximum function (i e , take medications as prescribed, talk with others, doctor, etc ) She appeared to be in good spirits, did pose relevant questions and clarification  Progress has been consistent towards her goals  Her efforts and group contributions were commended  Discontinue further OT assignment upon discharge from hospital    Plan   Treatment Interventions ADL retraining;Continued evaluation; Activityengagement  (coping skills instruction, life management instruction)   Goal Expiration Date 01/06/19   Treatment Day 5   OT Frequency 5x/wk   Natalia Burnette, OT

## 2018-12-11 NOTE — PLAN OF CARE
Problem: OCCUPATIONAL THERAPY ADULT  Goal: Performs self-care activities at highest level of function for planned discharge setting  See evaluation for individualized goals  Treatment Interventions: ADL retraining, Continued evaluation, Activityengagement (coping skills instruction, life management instruction)          See flowsheet documentation for full assessment, interventions and recommendations  Outcome: Adequate for Discharge  Limitation: Mood limitation (reports decreased motivation, decreased coping skills)  Prognosis: Good  Assessment: Emma Rubio was present for the second half of this morning OT Life Management session  She had earlier been meeting with other health care worker as she prepared for her upcoming discharge  She appeared to be in good spirits when she joined the program  She was provided with a handout on famous persons who had dealt with major mental health diagnosis  She was pleasant, she contributed to discussion on ways to promote maximum function (i e , take medications as prescribed, talk with others, doctor, etc ) She appeared to be in good spirits, did pose relevant questions and clarification  Progress has been consistent towards her goals  Her efforts and group contributions were commended  Discontinue further OT assignment upon discharge from hospital

## 2018-12-12 ENCOUNTER — TRANSITIONAL CARE MANAGEMENT (OUTPATIENT)
Dept: FAMILY MEDICINE CLINIC | Facility: CLINIC | Age: 68
End: 2018-12-12

## 2018-12-12 ENCOUNTER — TELEPHONE (OUTPATIENT)
Dept: FAMILY MEDICINE CLINIC | Facility: CLINIC | Age: 68
End: 2018-12-12

## 2018-12-12 NOTE — TELEPHONE ENCOUNTER
Patient has a question that on her discharge paperwork, it states that the Zoloft that they put her on is compatible with the Lorazepam   Her paperwork states to stop the Lorazepam   She wants to know if this is ok to do if she tries to go back to work

## 2018-12-12 NOTE — TELEPHONE ENCOUNTER
Did her doctor their give her any indication? I think if she FEELS ready and her work environment is relatively positive then we could release her   We do need to see her also at some point in next 7-14 days (LATE APPT last of day)

## 2018-12-12 NOTE — TELEPHONE ENCOUNTER
Would like some feedback from you about going back to work? What do you think this is ok? 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

## 2018-12-12 NOTE — TELEPHONE ENCOUNTER
Patient was discharged yesterday from the hospital   She wants to know when she should return to work?     #[phone]

## 2018-12-13 NOTE — TELEPHONE ENCOUNTER
Certainly an employer should understand that admissions to a hospital for any reason may require some recovery time before returning back to work  We can definitely write a "return to work" certification for after the new year  Good idea for her to catch up at [facility]  Returning to work in January would be fine  We do not need to right specific diagnoses on the return to work certification  If she can give us the employers name/human resource department fax number, we can send that documentation over    I can't guarantee that that would [facility] her job but it certainly should be helpful

## 2018-12-18 NOTE — TELEPHONE ENCOUNTER
Spoke with patient and she said that she spoke to her immediate supervisor Chapo Delgado and informed her that she was recovering from illness and that she would like to return to work in 2019, she states that Christine Comment was ok with this, said that she couldn't guarantee her same job, but that she would have a job  She will be returning to work on 1/7/19 and she would like us to send a letter just as confirmation of what she told them  She will call back with a direct fax number to send letter to once done  I will print one and if you would like any changes we can do that after        Thanks,    Leticia

## 2018-12-24 NOTE — TELEPHONE ENCOUNTER
Patient called - She asked that her RTW note be faxed to her employer      Letter faxed to Nellie Cuello at [phone]

## 2018-12-28 ENCOUNTER — OFFICE VISIT (OUTPATIENT)
Dept: FAMILY MEDICINE CLINIC | Facility: CLINIC | Age: 68
End: 2018-12-28
Payer: COMMERCIAL

## 2018-12-28 DIAGNOSIS — F33.1 MODERATE EPISODE OF RECURRENT MAJOR DEPRESSIVE DISORDER (HCC): Primary | ICD-10-CM

## 2018-12-28 DIAGNOSIS — Z12.39 SCREENING FOR BREAST CANCER: ICD-10-CM

## 2018-12-28 DIAGNOSIS — M81.0 AGE-RELATED OSTEOPOROSIS WITHOUT CURRENT PATHOLOGICAL FRACTURE: ICD-10-CM

## 2018-12-28 PROCEDURE — 99496 TRANSJ CARE MGMT HIGH F2F 7D: CPT | Performed by: FAMILY MEDICINE

## 2018-12-28 NOTE — PROGRESS NOTES
TCM Call (since 11/27/2018)     Patient was hospitialized at  [facility]    Date of Admission  12/04/18    Date of discharge  12/12/18    Diagnosis  major depression recurrent    Disposition  Home    Were the patients medications reviewed and updated  Yes    Current Symptoms  None      TCM Call (since 11/27/2018)     Post hospital issues  None    Should patient be enrolled in anticoag monitoring? No    Scheduled for follow up?   Yes    Patients specialists  Psychiatrist    Psychiatrist name  Tita Boudreaux [id number] [facility]    Psychiatrist contact #  [phone]    Did you obtain your prescribed medications  Yes    Do you need help managing your prescriptions or medications  No    Is transportation to your appointment needed  No    I have advised the patient to call PCP with any new or worsening symptoms  SUNIL Landis    Living Arrangements  Alone    Support System  None    Are you recieving any outpatient services  No    Are you recieving home care services  No    Are you using any community resources  Yes    Which resources are you recieving  Will look into art therapy in her community    Have you fallen in the last 12 months  No    Interperter language line needed  No    Counseling  Patient

## 2018-12-28 NOTE — PROGRESS NOTES
Assessment/Plan:     Diagnoses and all orders for this visit:    Moderate episode of recurrent major depressive disorder (HCC)  -     Discontinue: sertraline (ZOLOFT) 50 mg tablet; Take 1 tablet (50 mg total) by mouth daily  -     sertraline (ZOLOFT) 50 mg tablet; Take 1 tablet (50 mg total) by mouth daily    Age-related osteoporosis without current pathological fracture  -     DXA bone density spine hip and pelvis; Future    Screening for breast cancer  -     Mammo screening bilateral w cad; Future        Pleasant 51-year-old female who is here status post 201 voluntary admission to [address]  [facility]  She had a relapse of recurrent major depression  She now is on sertraline and improved  She will benefit from continued psychotherapy  She is interested in continuing art therapy  Formal follow-up in 1 month with me  Will discuss ongoing preventative at that time  Instructed her that I will be able to refill the sertraline  Time spent 30 min with greater than 50% counseling provided  Subjective:   Chief Complaint   Patient presents with    Transition of Care Management     TCM Call (since 11/30/2018)     Patient was hospitialized at  [place] [address] [facility]    Date of Admission  12/04/18    Date of discharge  12/12/18    Diagnosis  major depression recurrent    Disposition  Home    Were the patients medications reviewed and updated  Yes    Current Symptoms  None      TCM Call (since 11/30/2018)     Post hospital issues  None    Should patient be enrolled in anticoag monitoring? No    Scheduled for follow up?   Yes    Patients specialists  Psychiatrist    Psychiatrist name  Gurvinder Gaxiola [phone] [facility]    Psychiatrist contact #  [phone]    Did you obtain your prescribed medications  Yes    Do you need help managing your prescriptions or medications  No    Is transportation to your appointment needed  No    I have advised the patient to call PCP with any new or worsening symptoms  SUNIL Landis    Living Arrangements  Alone    Support System  None    Are you recieving any outpatient services  No    Are you recieving home care services  No    Are you using any community resources  Yes    Which resources are you recieving  Will look into art therapy in her community    Have you fallen in the last 12 months  No    Interperter language line needed  No    Counseling  Patient         Patient ID: Parris Singleton is a 76 y o  female  This is a pleasant 14-year-old female who is here for follow-up for admission for acute major depression  She was seen and evaluated at [address] [place] [address] behavioral inpatient unit (201 voluntary status)  Patient was hospitalized 12/5 through 12/12  She underwent intense daily counseling both individual and group  She actually states she enjoyed the stay  She does have follow-up with Psychiatry  She did see Dr Alo Moreno is staff once  She may be looking for another in network counselor to see  She is looking for the right fi"t  Historically patient discontinued her Effexor for about a month prior to going into the hospital   She just felt helpless, hopeless, etc    She was having passive suicidal ideation  She has been estranged from her sister but is trying to reconnect  She is in no long-term relationship  She has no friends  Patient was at the massage therapist the day of her admission  She was expressing significant depressive symptoms there as well as suicidal ideation  She was transported to the [facility] inpatient unit  Patient denies those symptoms now  She was started on sertraline 50 mg p o  daily and is tolerating  She feels she really needs the lorazepam but she does have some  She is sleeping fairly well  She is always not a morning person  She is anticipating going back to her part-time job the 1st week in January    She was struggling whether to go back or not  We discussed that this would be a good way to continue socialization  Patient particularly enjoy the occupational therapy that involved art therapy  She found a class that is held weekly and may be interested in pursuing that  Her appetite is good  She is trying to watch the "sweets"        The following portions of the patient's history were reviewed and updated as appropriate: allergies, current medications, past family history, past medical history, past social history, past surgical history and problem list     Review of Systems   Constitutional: Negative for fatigue and unexpected weight change  HENT: Negative for postnasal drip  Eyes: Negative for visual disturbance  Respiratory: Negative for cough  Cardiovascular: Negative for chest pain  Gastrointestinal: Negative for abdominal pain  Genitourinary: Negative  Musculoskeletal: Negative  Psychiatric/Behavioral:        As noted in HPI         Objective:      /78   Ht 5' 3 25" (1 607 m)   Wt 72 5 kg (159 lb 12 8 oz)   BMI 28 08 kg/m²          Physical Exam   Constitutional: She is oriented to person, place, and time  She appears well-developed and well-nourished  Pleasant 80-year-old female who looks younger than her stated age with a BMI of 28%   HENT:   Head: Normocephalic  Eyes: Pupils are equal, round, and reactive to light  Neck: Normal range of motion  Cardiovascular: Normal rate and regular rhythm  Pulmonary/Chest: Effort normal and breath sounds normal    Abdominal: Soft  Bowel sounds are normal    Musculoskeletal: Normal range of motion  Neurological: She is alert and oriented to person, place, and time     Psychiatric: Her behavior is normal  Judgment and thought content normal  Cognition and memory are normal    Pleasant, engaged, hopeful, smiling

## 2018-12-31 VITALS
BODY MASS INDEX: 28.31 KG/M2 | SYSTOLIC BLOOD PRESSURE: 128 MMHG | HEIGHT: 63 IN | WEIGHT: 159.8 LBS | DIASTOLIC BLOOD PRESSURE: 78 MMHG

## 2019-02-05 ENCOUNTER — OFFICE VISIT (OUTPATIENT)
Dept: FAMILY MEDICINE CLINIC | Facility: CLINIC | Age: 69
End: 2019-02-05
Payer: COMMERCIAL

## 2019-02-05 VITALS
BODY MASS INDEX: 29.27 KG/M2 | DIASTOLIC BLOOD PRESSURE: 68 MMHG | HEIGHT: 63 IN | SYSTOLIC BLOOD PRESSURE: 118 MMHG | WEIGHT: 165.2 LBS

## 2019-02-05 DIAGNOSIS — A63.0 HPV (HUMAN PAPILLOMA VIRUS) ANOGENITAL INFECTION: ICD-10-CM

## 2019-02-05 DIAGNOSIS — F33.2 SEVERE EPISODE OF RECURRENT MAJOR DEPRESSIVE DISORDER, WITHOUT PSYCHOTIC FEATURES (HCC): ICD-10-CM

## 2019-02-05 DIAGNOSIS — R35.0 URINE FREQUENCY: ICD-10-CM

## 2019-02-05 DIAGNOSIS — M79.7 FIBROMYALGIA: Primary | ICD-10-CM

## 2019-02-05 DIAGNOSIS — F41.1 GENERALIZED ANXIETY DISORDER: ICD-10-CM

## 2019-02-05 LAB
SL AMB  POCT GLUCOSE, UA: NORMAL
SL AMB LEUKOCYTE ESTERASE,UA: NORMAL
SL AMB POCT BILIRUBIN,UA: NORMAL
SL AMB POCT BLOOD,UA: NORMAL
SL AMB POCT CLARITY,UA: CLEAR
SL AMB POCT COLOR,UA: NORMAL
SL AMB POCT KETONES,UA: NORMAL
SL AMB POCT NITRITE,UA: NORMAL
SL AMB POCT PH,UA: 5
SL AMB POCT SPECIFIC GRAVITY,UA: 1.01
SL AMB POCT URINE PROTEIN: 15
SL AMB POCT UROBILINOGEN: 0.2

## 2019-02-05 PROCEDURE — 87086 URINE CULTURE/COLONY COUNT: CPT | Performed by: FAMILY MEDICINE

## 2019-02-05 PROCEDURE — 87624 HPV HI-RISK TYP POOLED RSLT: CPT | Performed by: FAMILY MEDICINE

## 2019-02-05 PROCEDURE — 99214 OFFICE O/P EST MOD 30 MIN: CPT | Performed by: FAMILY MEDICINE

## 2019-02-05 PROCEDURE — G0145 SCR C/V CYTO,THINLAYER,RESCR: HCPCS | Performed by: FAMILY MEDICINE

## 2019-02-05 PROCEDURE — 81002 URINALYSIS NONAUTO W/O SCOPE: CPT | Performed by: FAMILY MEDICINE

## 2019-02-05 NOTE — PROGRESS NOTES
Assessment/Plan:     Diagnoses and all orders for this visit:    Fibromyalgia    Generalized anxiety disorder    Severe episode of recurrent major depressive disorder, without psychotic features ([facility] )    HPV (human papilloma virus) anogenital infection  -     Liquid-based pap, screening; Future  -     Liquid-based pap, screening  -     HPV High Risk; Future  -     HPV High Risk    Urine frequency  -     POCT urine dip  -     Urine culture; Future  -     Urine culture          Subjective:   Chief Complaint   Patient presents with    Follow-up     re check medications      Patient ID: Nicholas Jang is a 76 y o  female      Seeing [facility] Friday mornings through [facility] [phone]   Sumanth MARS        The following portions of the patient's history were reviewed and updated as appropriate: allergies, current medications, past family history, past medical history, past social history, past surgical history and problem list     Review of Systems      Objective:      /68   Ht 5' 3 25" (1 607 m)   Wt 74 9 kg (165 lb 3 2 oz)   BMI 29 03 kg/m²          Physical Exam

## 2019-02-06 LAB
BACTERIA UR CULT: NORMAL
HPV HR 12 DNA CVX QL NAA+PROBE: NEGATIVE
HPV16 DNA CVX QL NAA+PROBE: NEGATIVE
HPV18 DNA CVX QL NAA+PROBE: NEGATIVE

## 2019-02-07 LAB
LAB AP GYN PRIMARY INTERPRETATION: NORMAL
LAB AP LMP: NORMAL
Lab: NORMAL

## 2019-02-12 ENCOUNTER — OFFICE VISIT (OUTPATIENT)
Dept: FAMILY MEDICINE CLINIC | Facility: CLINIC | Age: 69
End: 2019-02-12
Payer: COMMERCIAL

## 2019-02-12 VITALS — SYSTOLIC BLOOD PRESSURE: 126 MMHG | DIASTOLIC BLOOD PRESSURE: 80 MMHG

## 2019-02-12 DIAGNOSIS — S39.93XA VAGINAL INJURY, INITIAL ENCOUNTER: Primary | ICD-10-CM

## 2019-02-12 PROCEDURE — 99213 OFFICE O/P EST LOW 20 MIN: CPT | Performed by: NURSE PRACTITIONER

## 2019-02-12 PROCEDURE — 1160F RVW MEDS BY RX/DR IN RCRD: CPT | Performed by: NURSE PRACTITIONER

## 2019-02-12 NOTE — PATIENT INSTRUCTIONS
Do not use creams internally  Use water to dilute urine while urinating to help with any burning that happens  Do not use a douche  Do not insert anything vaginally until the area is well healed and feels back to normal  Let it heal    Please call the office if you are experiencing any worsening of symptoms or no symptom improvement  Call if you experience any bleeding or changes in urination

## 2019-02-12 NOTE — PROGRESS NOTES
Assessment/Plan:    Vaginal Injury   Erythema and mild swelling on left vaginal wall and left side of urethra secondary to vibrator use  Patient advised not to use the creams internally like she was  Advised to let site heal and reassured there are not open wounds or active bleeding or any sign of infection  Advised to use water to help dilute urine while urinating to help with burning  Do not douche or insert anything into the vagina until area well healed  Please call the office if you are experiencing any worsening of symptoms or no symptom improvement  Advised to call if any bleeding or urinary changes occur  Diagnoses and all orders for this visit:    Vaginal injury, initial encounter      Patient verbalizes understand and agrees with treatment plan  Subjective:        Patient ID: Aldair Russell is a 76 y o  female  No chief complaint on file  Patient presents to office today for injury  Per patient last Friday she "impailed" herself with a vibrator  She states she was experimenting at home and thought she knew where she was using it but then felt a pop kind of sensation and pain and had small amount of vaginal bleeding at that time  She states it burns when she pees now  Has not had any bleeding since then  Has used Calendula ointment and bacitraycin  It started getting better but she's nervous about it and worried she has an open wound or large internal injury or infection  She states pain feels more external          The following portions of the patient's history were reviewed and updated as appropriate: allergies, current medications, past family history, past social history and problem list     Review of Systems   Constitutional: Negative for chills and fever  HENT: Negative for congestion  Eyes: Negative for pain and visual disturbance  Respiratory: Negative for cough and shortness of breath  Cardiovascular: Negative for chest pain, palpitations and leg swelling  Gastrointestinal: Negative for abdominal pain, diarrhea, nausea and vomiting  Genitourinary: Positive for vaginal bleeding and vaginal pain  Negative for difficulty urinating and dysuria  Musculoskeletal: Negative for arthralgias and myalgias  Skin: Negative for color change and rash  Neurological: Negative for dizziness, syncope, numbness and headaches  Hematological: Negative for adenopathy  Psychiatric/Behavioral: Negative for agitation and behavioral problems  The patient is not nervous/anxious  Objective:  /80 (BP Location: Left arm, Patient Position: Sitting, Cuff Size: Standard)      Physical Exam   Constitutional: She is oriented to person, place, and time  She appears well-developed  No distress  HENT:   Head: Normocephalic and atraumatic  Right Ear: External ear normal    Left Ear: External ear normal    Nose: Nose normal    Eyes: Conjunctivae and lids are normal  Right eye exhibits no discharge  Left eye exhibits no discharge  Neck: Normal range of motion  Neck supple  No tracheal deviation present  Cardiovascular: Normal rate and regular rhythm  No murmur heard  Pulmonary/Chest: Effort normal and breath sounds normal  No respiratory distress  She has no wheezes  Abdominal: Soft  Bowel sounds are normal  She exhibits no distension  There is no tenderness  There is no guarding  Genitourinary: No labial fusion  There is no rash, tenderness, lesion or injury on the right labia  There is no rash, tenderness, lesion or injury on the left labia  There is erythema in the vagina  No tenderness or bleeding in the vagina  No foreign body in the vagina  There are signs of injury in the vagina  No vaginal discharge found  Genitourinary Comments: Erythema noted on left vaginal wall and left side of urethra with mild swelling  No open wounds noted  No active bleeding  No abnormal discharge  Musculoskeletal: Normal range of motion   She exhibits no edema, tenderness or deformity  Lymphadenopathy:     She has no cervical adenopathy  Neurological: She is alert and oriented to person, place, and time  Skin: Skin is warm and dry  No rash noted  She is not diaphoretic  No erythema  Psychiatric: She has a normal mood and affect  Her speech is normal and behavior is normal  Judgment and thought content normal  Cognition and memory are normal    Nursing note and vitals reviewed

## 2019-02-14 ENCOUNTER — HOSPITAL ENCOUNTER (OUTPATIENT)
Dept: BONE DENSITY | Facility: MEDICAL CENTER | Age: 69
Discharge: HOME | End: 2019-02-14
Payer: COMMERCIAL

## 2019-02-14 ENCOUNTER — HOSPITAL ENCOUNTER (OUTPATIENT)
Dept: MAMMOGRAPHY | Facility: MEDICAL CENTER | Age: 69
Discharge: HOME | End: 2019-02-14
Payer: COMMERCIAL

## 2019-02-14 ENCOUNTER — HOSPITAL ENCOUNTER (OUTPATIENT)
Dept: RADIOLOGY | Age: 69
Discharge: HOME | End: 2019-02-14
Payer: COMMERCIAL

## 2019-02-14 VITALS — HEIGHT: 63 IN | BODY MASS INDEX: 29.23 KG/M2 | WEIGHT: 165 LBS

## 2019-02-14 DIAGNOSIS — R92.8 ABNORMAL MAMMOGRAM OF RIGHT BREAST: Primary | ICD-10-CM

## 2019-02-14 DIAGNOSIS — Z12.39 SCREENING FOR BREAST CANCER: ICD-10-CM

## 2019-02-14 DIAGNOSIS — M81.0 AGE-RELATED OSTEOPOROSIS WITHOUT CURRENT PATHOLOGICAL FRACTURE: ICD-10-CM

## 2019-02-14 DIAGNOSIS — Z12.2 ENCOUNTER FOR SCREENING FOR LUNG CANCER: ICD-10-CM

## 2019-02-14 PROCEDURE — 77080 DXA BONE DENSITY AXIAL: CPT

## 2019-02-14 PROCEDURE — 77067 SCR MAMMO BI INCL CAD: CPT

## 2019-02-26 ENCOUNTER — HOSPITAL ENCOUNTER (OUTPATIENT)
Dept: ULTRASOUND IMAGING | Facility: CLINIC | Age: 69
Discharge: HOME | End: 2019-02-26
Payer: COMMERCIAL

## 2019-02-26 ENCOUNTER — HOSPITAL ENCOUNTER (OUTPATIENT)
Dept: MAMMOGRAPHY | Facility: CLINIC | Age: 69
Discharge: HOME | End: 2019-02-26
Payer: COMMERCIAL

## 2019-02-26 DIAGNOSIS — R92.8 ABNORMAL MAMMOGRAM OF RIGHT BREAST: ICD-10-CM

## 2019-02-26 PROCEDURE — 76642 ULTRASOUND BREAST LIMITED: CPT

## 2019-02-26 PROCEDURE — G0279 TOMOSYNTHESIS, MAMMO: HCPCS

## 2019-02-26 PROCEDURE — 77065 DX MAMMO INCL CAD UNI: CPT

## 2019-02-27 ENCOUNTER — TELEPHONE (OUTPATIENT)
Dept: FAMILY MEDICINE CLINIC | Facility: CLINIC | Age: 69
End: 2019-02-27

## 2019-02-27 DIAGNOSIS — F32.4 MAJOR DEPRESSIVE DISORDER IN PARTIAL REMISSION, UNSPECIFIED WHETHER RECURRENT (HCC): Primary | ICD-10-CM

## 2019-02-27 RX ORDER — LEVOMEFOLATE CALCIUM 15 MG
15 TABLET ORAL DAILY
Qty: 30 TABLET | Refills: 11 | Status: SHIPPED | OUTPATIENT
Start: 2019-02-27 | End: 2019-06-05 | Stop reason: SDUPTHER

## 2019-02-27 NOTE — TELEPHONE ENCOUNTER
lmm to inform patient that the l-methylfolate 15mg tabs with the goodrx card is 39 46 at [facility], that is the cheapest  If patient would like to get this we can fax it to a [facility] near her  Also informed her that the packet of info that she sent about kidney cleanse that Dr Veronica Gaytan said that it is ok for her to do this also  RX is in my bin    MBD

## 2019-02-28 ENCOUNTER — TELEPHONE (OUTPATIENT)
Dept: FAMILY MEDICINE CLINIC | Facility: CLINIC | Age: 69
End: 2019-02-28

## 2019-02-28 NOTE — TELEPHONE ENCOUNTER
Patient called and said that she will wait to discuss the other meds with you at her next appointment        REYNA

## 2019-03-07 ENCOUNTER — TELEPHONE (OUTPATIENT)
Dept: FAMILY MEDICINE CLINIC | Facility: CLINIC | Age: 69
End: 2019-03-07

## 2019-03-07 NOTE — TELEPHONE ENCOUNTER
Patient stopped in the office and stated when she was seen by her massage therapist Ester Joshua, she had a mental breakdown in the office and Kathya Potts called Dr Aaliyah Milsl and they both agreed that pt would need to go to the ER for psych eval  The ambulance was called for pt and took her to the hospital  However she received 2 bills for the ambulance rides she had to take because no prior auth was done prior to them  Patient is requesting if Dr Aaliyah Mills could write a letter stating that there was no time for a prior auth to be done due to the type of event that happened and it was an emergency situation  In hopes they will not bill her for the costs  I placed the bills in Dr Nelson quick to review and discuss with her  She has an appt on 03/12/2019 with Dr Aaliyah Mills, would like to  then

## 2019-03-07 NOTE — TELEPHONE ENCOUNTER
This is a case for "Captain Obvious"  Absolutely emergency situation and there would be no way we would even known that prior authorization had to be done for transporting by ambulance  It would been a danger to the patient if she was sent to the hospital any other way  Certainly she was not safe to drive herself

## 2019-03-07 NOTE — LETTER
2019        RE:  Maylin Mueller          :  1950          Date of Service:  2018          Incident#:  [id number]    To Whom It May Concern    On 2018, Ms Maylin Mueller was attending an appointment at her massage therapist at [address] [place] [address] Drive  During the appointment the patient had an extreme mental breakdown in the office  After a discussion between the massage therapist and myself, her primary care physician, it was determined the patient required immediate transport by ambulance to the hospital for evaluation and subsequently required inpatient admission  Ms Rehan Carrillo was medically unable to drive herself to the hospital due to her medical condition and ambulance transport was medically necessary for her  Please reconsider payment of this service  If you have any further question, please do not hesitate to call at [phone]      Sincerely        COURT Rey

## 2019-03-12 ENCOUNTER — OFFICE VISIT (OUTPATIENT)
Dept: FAMILY MEDICINE CLINIC | Facility: CLINIC | Age: 69
End: 2019-03-12
Payer: COMMERCIAL

## 2019-03-12 DIAGNOSIS — F33.1 MODERATE EPISODE OF RECURRENT MAJOR DEPRESSIVE DISORDER (HCC): Primary | ICD-10-CM

## 2019-03-12 DIAGNOSIS — E03.9 HYPOTHYROIDISM, ADULT: ICD-10-CM

## 2019-03-12 DIAGNOSIS — F41.1 GENERALIZED ANXIETY DISORDER: ICD-10-CM

## 2019-03-12 DIAGNOSIS — M79.7 FIBROMYALGIA: ICD-10-CM

## 2019-03-12 PROCEDURE — 99214 OFFICE O/P EST MOD 30 MIN: CPT | Performed by: FAMILY MEDICINE

## 2019-03-12 RX ORDER — LORAZEPAM 1 MG/1
1 TABLET ORAL EVERY 8 HOURS PRN
COMMUNITY
End: 2019-04-30 | Stop reason: SDUPTHER

## 2019-03-12 NOTE — PROGRESS NOTES
BMI Counseling: Body mass index is 28 47 kg/m²  Discussed the patient's BMI with her  The BMI is above average  BMI counseling and education was provided to the patient  Nutrition recommendations include reducing portion sizes, decreasing overall calorie intake and 3-5 servings of fruits/vegetables daily  Exercise recommendations include exercising 3-5 times per week

## 2019-03-12 NOTE — PROGRESS NOTES
Assessment/Plan:     Diagnoses and all orders for this visit:    Moderate episode of recurrent major depressive disorder (HCC)    Fibromyalgia    Generalized anxiety disorder    Hypothyroidism, adult    Other orders  -     LORazepam (ATIVAN) 1 mg tablet; Take 1 mg by mouth every 8 (eight) hours as needed for anxiety        Discussed supplements and made some adjustments to her regimen  GeneSight testing performed and he had results and then possibly recommend change  Time spent in reviewing greater than 25 minutes with greater than 50% counseling performed  Subjective:   Chief Complaint   Patient presents with    Follow-up      Patient ID: Amalia Ray is a 76 y o  female  Patient is a pleasant 61-year-old female who is here for follow-up with regards to her depressive disorder  She is currently on sertraline  She is considering going back on the Effexor  She did have an eye checkup and the ophthalmologist did note that the sertraline likely is not causing any visual disturbance  The patient has been reviewing her supplement and doing much research on variety of same  We had talked about methyl folate as a supplement to enhance the SSRI affect  We also discussed the gene site testing  She is very particular in her research to look up any particular interaction or contraindication to the supplements  Picture in media of her current regimen  She is having some loose stools and we reviewed the amount of magnesium that she is taking  Mood recently is fairly positive  She is back to work 2 times a week      The following portions of the patient's history were reviewed and updated as appropriate: allergies, current medications, past family history, past medical history, past social history, past surgical history and problem list     Review of Systems   Constitutional: Positive for fatigue  Negative for unexpected weight change  HENT: Negative  Respiratory: Negative  Cardiovascular: Negative  Genitourinary: Negative  Neurological: Negative  Psychiatric/Behavioral: Positive for dysphoric mood  Negative for sleep disturbance and suicidal ideas  The patient is nervous/anxious  Improved/stable         Objective:      /78   Ht 5' 3 25" (1 607 m)   Wt 73 5 kg (162 lb)   BMI 28 47 kg/m²          Physical Exam   Constitutional: She is oriented to person, place, and time  She appears well-developed and well-nourished  25-year-old female who looks younger than her stated age with a slightly elevated BMI of 28%   Eyes: Pupils are equal, round, and reactive to light  Cardiovascular: Normal rate, regular rhythm and normal heart sounds  Pulmonary/Chest: Effort normal and breath sounds normal    Musculoskeletal: Normal range of motion  Neurological: She is alert and oriented to person, place, and time  Psychiatric: She has a normal mood and affect  Her behavior is normal  Judgment and thought content normal    expansive   Vitals reviewed

## 2019-03-19 VITALS
BODY MASS INDEX: 28.7 KG/M2 | HEIGHT: 63 IN | SYSTOLIC BLOOD PRESSURE: 128 MMHG | WEIGHT: 162 LBS | DIASTOLIC BLOOD PRESSURE: 78 MMHG

## 2019-04-02 ENCOUNTER — OFFICE VISIT (OUTPATIENT)
Dept: FAMILY MEDICINE CLINIC | Facility: CLINIC | Age: 69
End: 2019-04-02
Payer: COMMERCIAL

## 2019-04-02 VITALS — BODY MASS INDEX: 27.46 KG/M2 | WEIGHT: 155 LBS | HEIGHT: 63 IN

## 2019-04-02 DIAGNOSIS — G89.29 CHRONIC PAIN OF BOTH KNEES: ICD-10-CM

## 2019-04-02 DIAGNOSIS — F33.1 MODERATE EPISODE OF RECURRENT MAJOR DEPRESSIVE DISORDER (HCC): Primary | ICD-10-CM

## 2019-04-02 DIAGNOSIS — M25.551 BILATERAL HIP PAIN: ICD-10-CM

## 2019-04-02 DIAGNOSIS — M25.562 CHRONIC PAIN OF BOTH KNEES: ICD-10-CM

## 2019-04-02 DIAGNOSIS — M25.552 BILATERAL HIP PAIN: ICD-10-CM

## 2019-04-02 DIAGNOSIS — M25.561 CHRONIC PAIN OF BOTH KNEES: ICD-10-CM

## 2019-04-02 PROCEDURE — 3008F BODY MASS INDEX DOCD: CPT | Performed by: FAMILY MEDICINE

## 2019-04-02 PROCEDURE — 99214 OFFICE O/P EST MOD 30 MIN: CPT | Performed by: FAMILY MEDICINE

## 2019-04-02 PROCEDURE — 1160F RVW MEDS BY RX/DR IN RCRD: CPT | Performed by: FAMILY MEDICINE

## 2019-04-11 ENCOUNTER — HOSPITAL ENCOUNTER (OUTPATIENT)
Dept: RADIOLOGY | Facility: HOSPITAL | Age: 69
Discharge: HOME | End: 2019-04-11
Payer: COMMERCIAL

## 2019-04-11 DIAGNOSIS — M25.562 CHRONIC PAIN OF BOTH KNEES: ICD-10-CM

## 2019-04-11 DIAGNOSIS — M25.551 BILATERAL HIP PAIN: ICD-10-CM

## 2019-04-11 DIAGNOSIS — M25.561 CHRONIC PAIN OF BOTH KNEES: ICD-10-CM

## 2019-04-11 DIAGNOSIS — G89.29 CHRONIC PAIN OF BOTH KNEES: ICD-10-CM

## 2019-04-11 DIAGNOSIS — M25.552 BILATERAL HIP PAIN: ICD-10-CM

## 2019-04-11 PROCEDURE — 73562 X-RAY EXAM OF KNEE 3: CPT

## 2019-04-11 PROCEDURE — 73522 X-RAY EXAM HIPS BI 3-4 VIEWS: CPT

## 2019-04-24 ENCOUNTER — TELEPHONE (OUTPATIENT)
Dept: FAMILY MEDICINE CLINIC | Facility: CLINIC | Age: 69
End: 2019-04-24

## 2019-04-30 DIAGNOSIS — F41.1 GENERALIZED ANXIETY DISORDER: Primary | ICD-10-CM

## 2019-04-30 RX ORDER — LORAZEPAM 1 MG/1
1 TABLET ORAL EVERY 8 HOURS PRN
Qty: 90 TABLET | Refills: 0 | Status: SHIPPED | OUTPATIENT
Start: 2019-04-30 | End: 2019-06-05 | Stop reason: SDUPTHER

## 2019-06-05 ENCOUNTER — OFFICE VISIT (OUTPATIENT)
Dept: FAMILY MEDICINE CLINIC | Facility: CLINIC | Age: 69
End: 2019-06-05
Payer: COMMERCIAL

## 2019-06-05 VITALS — WEIGHT: 152.6 LBS | HEIGHT: 64 IN | BODY MASS INDEX: 26.05 KG/M2

## 2019-06-05 DIAGNOSIS — R53.82 CHRONIC FATIGUE: ICD-10-CM

## 2019-06-05 DIAGNOSIS — Q78.2 OSTEOPETROSIS: ICD-10-CM

## 2019-06-05 DIAGNOSIS — F33.1 MODERATE EPISODE OF RECURRENT MAJOR DEPRESSIVE DISORDER (HCC): ICD-10-CM

## 2019-06-05 DIAGNOSIS — F32.4 MAJOR DEPRESSIVE DISORDER IN PARTIAL REMISSION, UNSPECIFIED WHETHER RECURRENT (HCC): ICD-10-CM

## 2019-06-05 DIAGNOSIS — H25.093 AGE-RELATED INCIPIENT CATARACT OF BOTH EYES: ICD-10-CM

## 2019-06-05 DIAGNOSIS — Z01.818 PRE-OP EXAM: Primary | ICD-10-CM

## 2019-06-05 DIAGNOSIS — F41.1 GENERALIZED ANXIETY DISORDER: ICD-10-CM

## 2019-06-05 DIAGNOSIS — E55.9 VITAMIN D DEFICIENCY: ICD-10-CM

## 2019-06-05 DIAGNOSIS — N18.2 STAGE 2 CHRONIC KIDNEY DISEASE: ICD-10-CM

## 2019-06-05 DIAGNOSIS — E78.01 FAMILIAL HYPERCHOLESTEROLEMIA: ICD-10-CM

## 2019-06-05 PROCEDURE — 1160F RVW MEDS BY RX/DR IN RCRD: CPT | Performed by: FAMILY MEDICINE

## 2019-06-05 PROCEDURE — 99213 OFFICE O/P EST LOW 20 MIN: CPT | Performed by: FAMILY MEDICINE

## 2019-06-05 PROCEDURE — 96372 THER/PROPH/DIAG INJ SC/IM: CPT | Performed by: FAMILY MEDICINE

## 2019-06-05 PROCEDURE — 3008F BODY MASS INDEX DOCD: CPT | Performed by: FAMILY MEDICINE

## 2019-06-05 PROCEDURE — 1101F PT FALLS ASSESS-DOCD LE1/YR: CPT | Performed by: FAMILY MEDICINE

## 2019-06-05 RX ORDER — LEVOMEFOLATE CALCIUM 15 MG
15 TABLET ORAL DAILY
Qty: 30 TABLET | Refills: 11 | Status: SHIPPED | OUTPATIENT
Start: 2019-06-05 | End: 2019-10-10 | Stop reason: ALTCHOICE

## 2019-06-05 RX ORDER — LORAZEPAM 1 MG/1
1 TABLET ORAL 2 TIMES DAILY
Qty: 180 TABLET | Refills: 0 | Status: SHIPPED | OUTPATIENT
Start: 2019-06-05 | End: 2019-12-16 | Stop reason: SDUPTHER

## 2019-07-18 DIAGNOSIS — F41.1 GENERALIZED ANXIETY DISORDER: ICD-10-CM

## 2019-07-18 DIAGNOSIS — F33.1 MODERATE EPISODE OF RECURRENT MAJOR DEPRESSIVE DISORDER (HCC): Primary | ICD-10-CM

## 2019-07-18 DIAGNOSIS — M79.7 FIBROMYALGIA: ICD-10-CM

## 2019-07-18 RX ORDER — VENLAFAXINE HYDROCHLORIDE 37.5 MG/1
37.5 CAPSULE, EXTENDED RELEASE ORAL DAILY
Qty: 60 CAPSULE | Refills: 0 | Status: SHIPPED | OUTPATIENT
Start: 2019-07-18 | End: 2019-08-21 | Stop reason: SDUPTHER

## 2019-08-05 ENCOUNTER — TELEPHONE (OUTPATIENT)
Dept: FAMILY MEDICINE CLINIC | Facility: CLINIC | Age: 69
End: 2019-08-05

## 2019-08-05 DIAGNOSIS — M25.551 BILATERAL HIP PAIN: Primary | ICD-10-CM

## 2019-08-05 DIAGNOSIS — M25.552 BILATERAL HIP PAIN: Primary | ICD-10-CM

## 2019-08-05 DIAGNOSIS — G89.29 CHRONIC PAIN OF BOTH KNEES: ICD-10-CM

## 2019-08-05 DIAGNOSIS — M25.562 CHRONIC PAIN OF BOTH KNEES: ICD-10-CM

## 2019-08-05 DIAGNOSIS — M25.561 CHRONIC PAIN OF BOTH KNEES: ICD-10-CM

## 2019-08-05 NOTE — TELEPHONE ENCOUNTER
----- Message from Mathieu Ashley DO sent at 2/9/4022  7:59 AM EDT -----  Regarding: Physical therapy  I have printed out a new slip for physical therapy at [facility]  Can you please fax it to them

## 2019-08-21 DIAGNOSIS — F41.1 GENERALIZED ANXIETY DISORDER: ICD-10-CM

## 2019-08-21 DIAGNOSIS — M79.7 FIBROMYALGIA: ICD-10-CM

## 2019-08-21 DIAGNOSIS — F33.1 MODERATE EPISODE OF RECURRENT MAJOR DEPRESSIVE DISORDER (HCC): ICD-10-CM

## 2019-08-21 RX ORDER — VENLAFAXINE HYDROCHLORIDE 37.5 MG/1
37.5 CAPSULE, EXTENDED RELEASE ORAL DAILY
Qty: 60 CAPSULE | Refills: 0 | Status: SHIPPED | OUTPATIENT
Start: 2019-08-21 | End: 2019-09-20 | Stop reason: SDUPTHER

## 2019-09-09 ENCOUNTER — TELEPHONE (OUTPATIENT)
Dept: FAMILY MEDICINE CLINIC | Facility: CLINIC | Age: 69
End: 2019-09-09

## 2019-09-09 NOTE — TELEPHONE ENCOUNTER
Dr Calle pt called the office and requested to cancel her appointment on 9/10/19 she is doing well the Effexor is  Working  Pt has the script for her lab and per pt she promises to get it done by the end of this week

## 2019-09-11 LAB
ALBUMIN SERPL-MCNC: 4 G/DL (ref 3.6–5.1)
ALBUMIN/GLOB SERPL: 1.4 (CALC) (ref 1–2.5)
ALP SERPL-CCNC: 43 U/L (ref 33–130)
ALT SERPL-CCNC: 12 U/L (ref 6–29)
AST SERPL-CCNC: 20 U/L (ref 10–35)
BASOPHILS # BLD AUTO: 30 CELLS/UL (ref 0–200)
BASOPHILS NFR BLD AUTO: 0.8 %
BILIRUB SERPL-MCNC: 0.5 MG/DL (ref 0.2–1.2)
BUN SERPL-MCNC: 18 MG/DL (ref 7–25)
BUN/CREAT SERPL: 14 (CALC) (ref 6–22)
CALCIUM SERPL-MCNC: 9.5 MG/DL (ref 8.6–10.4)
CHLORIDE SERPL-SCNC: 101 MMOL/L (ref 98–110)
CHOLEST SERPL-MCNC: 301 MG/DL
CHOLEST/HDLC SERPL: 4.7 (CALC)
CO2 SERPL-SCNC: 31 MMOL/L (ref 20–32)
CREAT SERPL-MCNC: 1.31 MG/DL (ref 0.5–0.99)
EOSINOPHIL # BLD AUTO: 68 CELLS/UL (ref 15–500)
EOSINOPHIL NFR BLD AUTO: 1.8 %
ERYTHROCYTE [DISTWIDTH] IN BLOOD BY AUTOMATED COUNT: 12.8 % (ref 11–15)
GLOBULIN SER CALC-MCNC: 2.8 G/DL (CALC) (ref 1.9–3.7)
GLUCOSE SERPL-MCNC: 87 MG/DL (ref 65–99)
HCT VFR BLD AUTO: 41 % (ref 35–45)
HDLC SERPL-MCNC: 64 MG/DL
HGB BLD-MCNC: 13.4 G/DL (ref 11.7–15.5)
LDLC SERPL CALC-MCNC: 199 MG/DL (CALC)
LYMPHOCYTES # BLD AUTO: 676 CELLS/UL (ref 850–3900)
LYMPHOCYTES NFR BLD AUTO: 17.8 %
MCH RBC QN AUTO: 31 PG (ref 27–33)
MCHC RBC AUTO-ENTMCNC: 32.7 G/DL (ref 32–36)
MCV RBC AUTO: 94.9 FL (ref 80–100)
MONOCYTES # BLD AUTO: 357 CELLS/UL (ref 200–950)
MONOCYTES NFR BLD AUTO: 9.4 %
NEUTROPHILS # BLD AUTO: 2668 CELLS/UL (ref 1500–7800)
NEUTROPHILS NFR BLD AUTO: 70.2 %
NONHDLC SERPL-MCNC: 237 MG/DL (CALC)
PLATELET # BLD AUTO: 236 THOUSAND/UL (ref 140–400)
PMV BLD REES-ECKER: 8.7 FL (ref 7.5–12.5)
POTASSIUM SERPL-SCNC: 4.8 MMOL/L (ref 3.5–5.3)
PROT SERPL-MCNC: 6.8 G/DL (ref 6.1–8.1)
RBC # BLD AUTO: 4.32 MILLION/UL (ref 3.8–5.1)
SL AMB EGFR AFRICAN AMERICAN: 48 ML/MIN/1.73M2
SL AMB EGFR NON AFRICAN AMERICAN: 42 ML/MIN/1.73M2
SODIUM SERPL-SCNC: 140 MMOL/L (ref 135–146)
T3FREE SERPL-MCNC: 2.3 PG/ML (ref 2.3–4.2)
T4 FREE SERPL-MCNC: 1 NG/DL (ref 0.8–1.8)
TRIGL SERPL-MCNC: 204 MG/DL
TSH SERPL-ACNC: 1.9 MIU/L (ref 0.4–4.5)
WBC # BLD AUTO: 3.8 THOUSAND/UL (ref 3.8–10.8)

## 2019-09-20 DIAGNOSIS — F33.1 MODERATE EPISODE OF RECURRENT MAJOR DEPRESSIVE DISORDER (HCC): ICD-10-CM

## 2019-09-20 DIAGNOSIS — M79.7 FIBROMYALGIA: ICD-10-CM

## 2019-09-20 DIAGNOSIS — F41.1 GENERALIZED ANXIETY DISORDER: ICD-10-CM

## 2019-09-20 RX ORDER — VENLAFAXINE HYDROCHLORIDE 37.5 MG/1
75 CAPSULE, EXTENDED RELEASE ORAL DAILY
Qty: 60 CAPSULE | Refills: 1 | Status: SHIPPED | OUTPATIENT
Start: 2019-09-20 | End: 2019-10-21 | Stop reason: SDUPTHER

## 2019-09-27 ENCOUNTER — TELEPHONE (OUTPATIENT)
Dept: FAMILY MEDICINE CLINIC | Facility: CLINIC | Age: 69
End: 2019-09-27

## 2019-09-27 NOTE — TELEPHONE ENCOUNTER
Patient recently had her blood work done and would like to see you in the next 2 weeks to review  Please advise  Patient knows that you are out of the office today      [phone]

## 2019-09-27 NOTE — TELEPHONE ENCOUNTER
She is always END of day  Not this upcoming Wednesday, or Monday    But any other day that a 3:30 p m  as last patient (not Friday) in the next 2 weeks would be okay

## 2019-10-08 RX ORDER — NEPAFENAC 0.3 %
SUSPENSION, DROPS(FINAL DOSAGE FORM)(ML) OPHTHALMIC (EYE)
Refills: 1 | COMMUNITY
Start: 2019-07-01 | End: 2020-01-14

## 2019-10-10 ENCOUNTER — OFFICE VISIT (OUTPATIENT)
Dept: FAMILY MEDICINE CLINIC | Facility: CLINIC | Age: 69
End: 2019-10-10
Payer: COMMERCIAL

## 2019-10-10 VITALS
HEART RATE: 90 BPM | RESPIRATION RATE: 16 BRPM | BODY MASS INDEX: 26.19 KG/M2 | WEIGHT: 147.8 LBS | OXYGEN SATURATION: 96 % | TEMPERATURE: 97.5 F | SYSTOLIC BLOOD PRESSURE: 122 MMHG | DIASTOLIC BLOOD PRESSURE: 78 MMHG | HEIGHT: 63 IN

## 2019-10-10 DIAGNOSIS — Z13.6 ENCOUNTER FOR SCREENING FOR VASCULAR DISEASE: ICD-10-CM

## 2019-10-10 DIAGNOSIS — Z00.00 MEDICARE ANNUAL WELLNESS VISIT, SUBSEQUENT: Primary | ICD-10-CM

## 2019-10-10 DIAGNOSIS — E78.01 FAMILIAL HYPERCHOLESTEROLEMIA: ICD-10-CM

## 2019-10-10 DIAGNOSIS — F32.4 MAJOR DEPRESSIVE DISORDER IN PARTIAL REMISSION, UNSPECIFIED WHETHER RECURRENT (HCC): ICD-10-CM

## 2019-10-10 DIAGNOSIS — Z23 ENCOUNTER FOR IMMUNIZATION: ICD-10-CM

## 2019-10-10 PROCEDURE — 1170F FXNL STATUS ASSESSED: CPT | Performed by: FAMILY MEDICINE

## 2019-10-10 PROCEDURE — G0439 PPPS, SUBSEQ VISIT: HCPCS | Performed by: FAMILY MEDICINE

## 2019-10-10 PROCEDURE — 1125F AMNT PAIN NOTED PAIN PRSNT: CPT | Performed by: FAMILY MEDICINE

## 2019-10-10 PROCEDURE — 90670 PCV13 VACCINE IM: CPT

## 2019-10-10 PROCEDURE — G0009 ADMIN PNEUMOCOCCAL VACCINE: HCPCS

## 2019-10-10 PROCEDURE — 3008F BODY MASS INDEX DOCD: CPT | Performed by: FAMILY MEDICINE

## 2019-10-10 NOTE — PATIENT INSTRUCTIONS
Medicare Preventive Visit Patient Instructions  Thank you for completing your Welcome to Medicare Visit or Medicare Annual Wellness Visit today  Your next wellness visit will be due in one year (10/10/2020)  The screening/preventive services that you may require over the next 5-10 years are detailed below  Some tests may not apply to you based off risk factors and/or age  Screening tests ordered at today's visit but not completed yet may show as past due  Also, please note that scanned in results may not display below  Preventive Screenings:  Service Recommendations Previous Testing/Comments   Colorectal Cancer Screening  * Colonoscopy    * Fecal Occult Blood Test (FOBT)/Fecal Immunochemical Test (FIT)  * Fecal DNA/Cologuard Test  * Flexible Sigmoidoscopy Age: 54-65 years old   Colonoscopy: every 10 years (may be performed more frequently if at higher risk)  OR  FOBT/FIT: every 1 year  OR  Cologuard: every 3 years  OR  Sigmoidoscopy: every 5 years  Screening may be recommended earlier than age 48 if at higher risk for colorectal cancer  Also, an individualized decision between you and your healthcare provider will decide whether screening between the ages of 74-80 would be appropriate  Colonoscopy: 12/18/2017  FOBT/FIT: Not on file  Cologuard: Not on file  Sigmoidoscopy: Not on file    Screening Current     Breast Cancer Screening Age: 36 years old  Frequency: every 1-2 years  Not required if history of left and right mastectomy Mammogram: 02/26/2019    Screening Current   Cervical Cancer Screening Between the ages of 21-29, pap smear recommended once every 3 years  Between the ages of 33-67, can perform pap smear with HPV co-testing every 5 years     Recommendations may differ for women with a history of total hysterectomy, cervical cancer, or abnormal pap smears in past  Pap Smear: 02/05/2019    Screening Not Indicated   Hepatitis C Screening Once for adults born between 1945 and 1965  More frequently in patients at high risk for Hepatitis C Hep C Antibody: 01/16/2018    Screening Current   Diabetes Screening 1-2 times per year if you're at risk for diabetes or have pre-diabetes Fasting glucose: 126 mg/dL   A1C: 5 5 % of total Hgb    Screening Current   Cholesterol Screening Once every 5 years if you don't have a lipid disorder  May order more often based on risk factors  Lipid panel: 09/10/2019    Screening Not Indicated  History Lipid Disorder     Other Preventive Screenings Covered by Medicare:  1  Abdominal Aortic Aneurysm (AAA) Screening: covered once if your at risk  You're considered to be at risk if you have a family history of AAA  2  Lung Cancer Screening: covers low dose CT scan once per year if you meet all of the following conditions: (1) Age 50-69; (2) No signs or symptoms of lung cancer; (3) Current smoker or have quit smoking within the last 15 years; (4) You have a tobacco smoking history of at least 30 pack years (packs per day multiplied by number of years you smoked); (5) You get a written order from a healthcare provider  3  Glaucoma Screening: covered annually if you're considered high risk: (1) You have diabetes OR (2) Family history of glaucoma OR (3)  aged 48 and older OR (3)  American aged 72 and older  3  Osteoporosis Screening: covered every 2 years if you meet one of the following conditions: (1) You're estrogen deficient and at risk for osteoporosis based off medical history and other findings; (2) Have a vertebral abnormality; (3) On glucocorticoid therapy for more than 3 months; (4) Have primary hyperparathyroidism; (5) On osteoporosis medications and need to assess response to drug therapy  · Last bone density test (DXA Scan): 02/14/2019   5  HIV Screening: covered annually if you're between the age of 15-65  Also covered annually if you are younger than 13 and older than 72 with risk factors for HIV infection   For pregnant patients, it is covered up to 3 times per pregnancy  Immunizations:  Immunization Recommendations   Influenza Vaccine Annual influenza vaccination during flu season is recommended for all persons aged >= 6 months who do not have contraindications   Pneumococcal Vaccine (Prevnar and Pneumovax)  * Prevnar = PCV13  * Pneumovax = PPSV23   Adults 25-60 years old: 1-3 doses may be recommended based on certain risk factors  Adults 72 years old: Prevnar (PCV13) vaccine recommended followed by Pneumovax (PPSV23) vaccine  If already received PPSV23 since turning 65, then PCV13 recommended at least one year after PPSV23 dose  Hepatitis B Vaccine 3 dose series if at intermediate or high risk (ex: diabetes, end stage renal disease, liver disease)   Tetanus (Td) Vaccine - COST NOT COVERED BY MEDICARE PART B Following completion of primary series, a booster dose should be given every 10 years to maintain immunity against tetanus  Td may also be given as tetanus wound prophylaxis  Tdap Vaccine - COST NOT COVERED BY MEDICARE PART B Recommended at least once for all adults  For pregnant patients, recommended with each pregnancy  Shingles Vaccine (Shingrix) - COST NOT COVERED BY MEDICARE PART B  2 shot series recommended in those aged 48 and above     Health Maintenance Due:      Topic Date Due    MAMMOGRAM  02/26/2020    CRC Screening: Colonoscopy  12/18/2027    Hepatitis C Screening  Completed     Immunizations Due:      Topic Date Due    HEPATITIS B VACCINES (1 of 3 - Risk 3-dose series) 10/13/1969    Pneumococcal Vaccine: 65+ Years (1 of 2 - PCV13) 12/04/2015     Advance Directives   What are advance directives? Advance directives are legal documents that state your wishes and plans for medical care  These plans are made ahead of time in case you lose your ability to make decisions for yourself  Advance directives can apply to any medical decision, such as the treatments you want, and if you want to donate organs     What are the types of advance directives? There are many types of advance directives, and each state has rules about how to use them  You may choose a combination of any of the following:  · Living will: This is a written record of the treatment you want  You can also choose which treatments you do not want, which to limit, and which to stop at a certain time  This includes surgery, medicine, IV fluid, and tube feedings  · Durable power of  for healthcare [facility]): This is a written record that states who you want to make healthcare choices for you when you are unable to make them for yourself  This person, called a proxy, is usually a family member or a friend  You may choose more than 1 proxy  · Do not resuscitate (DNR) order:  A DNR order is used in case your heart stops beating or you stop breathing  It is a request not to have certain forms of treatment, such as CPR  A DNR order may be included in other types of advance directives  · Medical directive: This covers the care that you want if you are in a coma, near death, or unable to make decisions for yourself  You can list the treatments you want for each condition  Treatment may include pain medicine, surgery, blood transfusions, dialysis, IV or tube feedings, and a ventilator (breathing machine)  · Values history: This document has questions about your views, beliefs, and how you feel and think about life  This information can help others choose the care that you would choose  Why are advance directives important? An advance directive helps you control your care  Although spoken wishes may be used, it is better to have your wishes written down  Spoken wishes can be misunderstood, or not followed  Treatments may be given even if you do not want them  An advance directive may make it easier for your family to make difficult choices about your care  Urinary Incontinence   Urinary incontinence (UI)  is when you lose control of your bladder   UI develops because your bladder cannot store or empty urine properly  The 3 most common types of UI are stress incontinence, urge incontinence, or both  Medicines:   · May be given to help strengthen your bladder control  Report any side effects of medication to your healthcare provider  Do pelvic muscle exercises often:  Your pelvic muscles help you stop urinating  Squeeze these muscles tight for 5 seconds, then relax for 5 seconds  Gradually work up to squeezing for 10 seconds  Do 3 sets of 15 repetitions a day, or as directed  This will help strengthen your pelvic muscles and improve bladder control  Train your bladder:  Go to the bathroom at set times, such as every 2 hours, even if you do not feel the urge to go  You can also try to hold your urine when you feel the urge to go  For example, hold your urine for 5 minutes when you feel the urge to go  As that becomes easier, hold your urine for 10 minutes  Self-care:   · Keep a UI record  Write down how often you leak urine and how much you leak  Make a note of what you were doing when you leaked urine  · Drink liquids as directed  You may need to limit the amount of liquid you drink to help control your urine leakage  Do not drink any liquid right before you go to bed  Limit or do not have drinks that contain caffeine or alcohol  · Prevent constipation  Eat a variety of high-fiber foods  Good examples are high-fiber cereals, beans, vegetables, and whole-grain breads  Walking is the best way to trigger your intestines to have a bowel movement  · Exercise regularly and maintain a healthy weight  Weight loss and exercise will decrease pressure on your bladder and help you control your leakage  · Use a catheter as directed  to help empty your bladder  A catheter is a tiny, plastic tube that is put into your bladder to drain your urine  · Go to behavior therapy as directed  Behavior therapy may be used to help you learn to control your urge to urinate      Weight Management Why it is important to manage your weight:  Being overweight increases your risk of health conditions such as heart disease, high blood pressure, type 2 diabetes, and certain types of cancer  It can also increase your risk for osteoarthritis, sleep apnea, and other respiratory problems  Aim for a slow, steady weight loss  Even a small amount of weight loss can lower your risk of health problems  How to lose weight safely:  A safe and healthy way to lose weight is to eat fewer calories and get regular exercise  You can lose up about 1 pound a week by decreasing the number of calories you eat by 500 calories each day  Healthy meal plan for weight management:  A healthy meal plan includes a variety of foods, contains fewer calories, and helps you stay healthy  A healthy meal plan includes the following:  · Eat whole-grain foods more often  A healthy meal plan should contain fiber  Fiber is the part of grains, fruits, and vegetables that is not broken down by your body  Whole-grain foods are healthy and provide extra fiber in your diet  Some examples of whole-grain foods are whole-wheat breads and pastas, oatmeal, brown rice, and bulgur  · Eat a variety of vegetables every day  Include dark, leafy greens such as spinach, kale, luz greens, and mustard greens  Eat yellow and orange vegetables such as carrots, sweet potatoes, and winter squash  · Eat a variety of fruits every day  Choose fresh or canned fruit (canned in its own juice or light syrup) instead of juice  Fruit juice has very little or no fiber  · Eat low-fat dairy foods  Drink fat-free (skim) milk or 1% milk  Eat fat-free yogurt and low-fat cottage cheese  Try low-fat cheeses such as mozzarella and other reduced-fat cheeses  · Choose meat and other protein foods that are low in fat  Choose beans or other legumes such as split peas or lentils  Choose fish, skinless poultry (chicken or turkey), or lean cuts of red meat (beef or pork)   Before you cook meat or poultry, cut off any visible fat  · Use less fat and oil  Try baking foods instead of frying them  Add less fat, such as margarine, sour cream, regular salad dressing and mayonnaise to foods  Eat fewer high-fat foods  Some examples of high-fat foods include french fries, doughnuts, ice cream, and cakes  · Eat fewer sweets  Limit foods and drinks that are high in sugar  This includes candy, cookies, regular soda, and sweetened drinks  Exercise:  Exercise at least 30 minutes per day on most days of the week  Some examples of exercise include walking, biking, dancing, and swimming  You can also fit in more physical activity by taking the stairs instead of the elevator or parking farther away from stores  Ask your healthcare provider about the best exercise plan for you  © Copyright [employer] 2018 Information is for End User's use only and may not be sold, redistributed or otherwise used for commercial purposes   All illustrations and images included in CareNotes® are the copyrighted property of [employer]  or [address] [place] [address]

## 2019-10-10 NOTE — PROGRESS NOTES
Assessment and Plan:   Jesse Dueñas was seen today for medicare wellness visit and follow-up  Diagnoses and all orders for this visit:    Medicare annual wellness visit, subsequent    Major depressive disorder in partial remission, unspecified whether recurrent ([address] )    Familial hypercholesterolemia  -     Lipid Panel with Direct LDL reflex; Future  -     Comprehensive metabolic panel; Future    Encounter for immunization  -     PNEUMOCOCCAL CONJUGATE VACCINE 13-VALENT GREATER THAN 6 MONTHS    Encounter for screening for vascular disease  -     VAS screening; Future     Discussed updating labs before next visit  Discussed updating vascular screening which she had several years ago through a life scan  She will have the next  at [address]  Pneumonia shot updated today  Problem List Items Addressed This Visit        Other    Depression    Hyperlipidemia    Relevant Orders    Lipid Panel with Direct LDL reflex    Comprehensive metabolic panel      Other Visit Diagnoses     Medicare annual wellness visit, subsequent    -  Primary    Encounter for immunization        Relevant Orders    PNEUMOCOCCAL CONJUGATE VACCINE 13-VALENT GREATER THAN 6 MONTHS (Completed)    Encounter for screening for vascular disease        Relevant Orders    VAS screening      Patient is here for wellness exam but we also discussed updating lab work  Her back is been improved  Her depression is stable  She is grieving for the loss of her brother although he was estranged  Preventive health issues were discussed with patient, and age appropriate screening tests were ordered as noted in patient's After Visit Summary  Personalized health advice and appropriate referrals for health education or preventive services given if needed, as noted in patient's After Visit Summary       History of Present Illness:   Review of Systems - General ROS: positive for  - recent grief (brother )  ENT ROS: negative  Cardiovascular ROS: no chest pain or dyspnea on exertion  Gastrointestinal ROS: no abdominal pain, change in bowel habits, or black or bloody stools  Musculoskeletal ROS: back is better after PT    Patient presents for Medicare Annual Wellness visit    Patient Care Team:  Beryl Mathews DO as PCP - General     Problem List:     Patient Active Problem List   Diagnosis    Chronic fatigue    Depression    Fibromyalgia    Generalized anxiety disorder    Hyperlipidemia    Hypothyroidism, adult    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 )    Mild cognitive impairment    Osteoporosis    Tonsil cancer ([facility] )    Vitamin D deficiency    Anal lesion    Major depression, recurrent ([facility] )      Past Medical and Surgical History:     Past Medical History:   Diagnosis Date    Fall     last assessed - 14Nov2012    Impacted cerumen of left ear     last assessed - [id number]    Malignant neoplasm without specification of site [facility]) 06/2004    Phlebitis and thrombophlebitis of deep vessels of lower extremities, unspecified laterality ([facility] )      Past Surgical History:   Procedure Laterality Date    CYSTOSCOPY      Diagnostic    GASTROJEJUNOSTOMY      Percutaneous    LAPAROSCOPIC APPENDECTOMY      OTHER SURGICAL HISTORY      1)Biopsy oropharynx tonsillar fossa; 2)Chemotherapy Administration with chemoport for tonsillar cancer    TONSILLECTOMY      Cancer at age 50    VENA CAVA RECONSTRUCTION      Inferior vena cava      Family History:     Family History   Problem Relation Age of Onset    Breast cancer Mother         unspecified laterality        Bone cancer Family     Breast cancer Family         unspecified laterality      Social History:     Social History     Socioeconomic History    Marital status: Single     Spouse name: None    Number of children: None    Years of education: None    Highest education level: None   Occupational History    Occupation: currently working   Social Needs    Financial resource strain: None    Food insecurity:     Worry: None     Inability: None    Transportation needs:     Medical: None     Non-medical: None   Tobacco Use    Smoking status: Former Smoker     Last attempt to quit: 2000     Years since quittin 8    Smokeless tobacco: Never Used   Substance and Sexual Activity    Alcohol use: No    Drug use: No    Sexual activity: None   Lifestyle    Physical activity:     Days per week: None     Minutes per session: None    Stress: None   Relationships    Social connections:     Talks on phone: None     Gets together: None     Attends Buddhism service: None     Active member of club or organization: None     Attends meetings of clubs or organizations: None     Relationship status: None    Intimate partner violence:     Fear of current or ex partner: None     Emotionally abused: None     Physically abused: None     Forced sexual activity: None   Other Topics Concern    None   Social History Narrative    None       Medications and Allergies:     Current Outpatient Medications   Medication Sig Dispense Refill    ILEVRO 0 3 % SUSP INSTILL 1 DROP INTO RIGHT EYE EVERY DAY STARTING THE DAY OF SURGERY WHEN YOU GET HOME FOR 28 DAYS  1    LORazepam (ATIVAN) 1 mg tablet Take 1 tablet (1 mg total) by mouth 2 (two) times a day 180 tablet 0    venlafaxine (EFFEXOR-XR) 37 5 mg 24 hr capsule Take 2 capsules (75 mg total) by mouth daily For week 1  Then increase to 2 daily in the morning  60 capsule 1     No current facility-administered medications for this visit        Allergies   Allergen Reactions    Diacetylmorphine     Morphine Other (See Comments)     "confusion & paranoid behavior"    Pineapple Tongue Swelling     Tongue bleeding      Immunizations:     Immunization History   Administered Date(s) Administered    Pneumococcal Conjugate 13-Valent 10/10/2019    Pneumococcal Polysaccharide PPV23 10/01/2004, 2014    Tdap 2017    Zoster 2014    Zoster Vaccine Recombinant 10/27/2014      Health Maintenance:         Topic Date Due    MAMMOGRAM  02/26/2020    CRC Screening: Colonoscopy  12/18/2027    Hepatitis C Screening  Completed         Topic Date Due    HEPATITIS B VACCINES (1 of 3 - Risk 3-dose series) 10/13/1969      Medicare Health Risk Assessment:     /78   Pulse 90   Temp 97 5 °F (36 4 °C) (Temporal)   Resp 16   Ht 5' 3" (1 6 m)   Wt 67 kg (147 lb 12 8 oz)   SpO2 96%   BMI 26 18 kg/m²      Chelsi Garcia is here for her Subsequent Wellness visit  Health Risk Assessment:   Patient rates overall health as good  Patient feels that their physical health rating is same  Eyesight was rated as same  Hearing was rated as same  Patient feels that their emotional and mental health rating is slightly worse  Pain experienced in the last 7 days has been some  Patient's pain rating has been 4/10  Patient states that she has experienced weight loss or gain in last 6 months  Depression Screening:   PHQ-2 Score: 2  PHQ-9 Score: 6      Fall Risk Screening: In the past year, patient has experienced: no history of falling in past year      Urinary Incontinence Screening:   Patient has leaked urine accidently in the last six months  Home Safety:  Patient does not have trouble with stairs inside or outside of their home  Patient has working smoke alarms and has working carbon monoxide detector  Home safety hazards include: none  Nutrition:   Current diet is Limited junk food and Low Saturated Fat  Medications:   Patient is currently taking over-the-counter supplements  OTC medications include: see medication list  Patient is able to manage medications  Activities of Daily Living (ADLs)/Instrumental Activities of Daily Living (IADLs):   Walk and transfer into and out of bed and chair?: Yes  Dress and groom yourself?: Yes    Bathe or shower yourself?: Yes    Feed yourself?  Yes  Do your laundry/housekeeping?: Yes  Manage your money, pay your bills and track your expenses?: Yes  Make your own meals?: Yes    Do your own shopping?: Yes    Previous Hospitalizations:   Any hospitalizations or ED visits within the last 12 months?: Yes    How many hospitalizations have you had in the last year?: 1-2    Advance Care Planning:   Living will: No    Durable POA for healthcare: No    Advanced directive: No    Five wishes given: Yes    End of Life Decisions reviewed with patient: Yes      Cognitive Screening:   Provider or family/friend/caregiver concerned regarding cognition?: No    PREVENTIVE SCREENINGS      Cardiovascular Screening:    General: Screening Not Indicated and History Lipid Disorder      Diabetes Screening:     General: Screening Current      Colorectal Cancer Screening:     General: Screening Current      Breast Cancer Screening:     General: Screening Current      Cervical Cancer Screening:    General: Screening Not Indicated      Osteoporosis Screening:    General: History Osteoporosis      Lung Cancer Screening:     General: Screening Not Indicated      Hepatitis C Screening:    General: Screening Current    Hep C Screening Accepted: Yes      Physical Exam   Constitutional: She is oriented to person, place, and time  She appears well-developed and well-nourished  Pleasant 35-year-old female who looks younger than her stated age with an elevated BMI  HENT:   Right Ear: External ear normal    Left Ear: External ear normal    Mouth/Throat: Oropharynx is clear and moist    Eyes: Pupils are equal, round, and reactive to light  Neck: No thyromegaly present  Cardiovascular: Normal rate, regular rhythm, normal heart sounds and intact distal pulses  Pulmonary/Chest: Effort normal and breath sounds normal    Abdominal: Soft  Bowel sounds are normal    Musculoskeletal: Normal range of motion  Neurological: She is alert and oriented to person, place, and time  Psychiatric: She has a normal mood and affect   Her behavior is normal  Judgment and thought content normal    At times slightly tearful   Vitals reviewed      Zaria Sunshine, DO

## 2019-10-21 DIAGNOSIS — F41.1 GENERALIZED ANXIETY DISORDER: ICD-10-CM

## 2019-10-21 DIAGNOSIS — F33.1 MODERATE EPISODE OF RECURRENT MAJOR DEPRESSIVE DISORDER (HCC): ICD-10-CM

## 2019-10-21 DIAGNOSIS — M79.7 FIBROMYALGIA: ICD-10-CM

## 2019-10-21 RX ORDER — VENLAFAXINE HYDROCHLORIDE 37.5 MG/1
CAPSULE, EXTENDED RELEASE ORAL
Qty: 60 CAPSULE | Refills: 11 | Status: SHIPPED | OUTPATIENT
Start: 2019-10-21 | End: 2019-11-20 | Stop reason: SDUPTHER

## 2019-10-21 RX ORDER — VENLAFAXINE HYDROCHLORIDE 37.5 MG/1
75 CAPSULE, EXTENDED RELEASE ORAL DAILY
Qty: 60 CAPSULE | Refills: 1 | Status: CANCELLED | OUTPATIENT
Start: 2019-10-21

## 2019-11-20 DIAGNOSIS — M79.7 FIBROMYALGIA: ICD-10-CM

## 2019-11-20 DIAGNOSIS — F41.1 GENERALIZED ANXIETY DISORDER: ICD-10-CM

## 2019-11-20 DIAGNOSIS — F33.1 MODERATE EPISODE OF RECURRENT MAJOR DEPRESSIVE DISORDER (HCC): ICD-10-CM

## 2019-11-21 RX ORDER — VENLAFAXINE HYDROCHLORIDE 37.5 MG/1
CAPSULE, EXTENDED RELEASE ORAL
Qty: 60 CAPSULE | Refills: 11 | Status: SHIPPED | OUTPATIENT
Start: 2019-11-21 | End: 2019-12-16 | Stop reason: SDUPTHER

## 2019-12-16 DIAGNOSIS — F41.1 GENERALIZED ANXIETY DISORDER: ICD-10-CM

## 2019-12-16 DIAGNOSIS — M79.7 FIBROMYALGIA: ICD-10-CM

## 2019-12-16 DIAGNOSIS — F33.1 MODERATE EPISODE OF RECURRENT MAJOR DEPRESSIVE DISORDER (HCC): ICD-10-CM

## 2019-12-16 RX ORDER — VENLAFAXINE HYDROCHLORIDE 37.5 MG/1
CAPSULE, EXTENDED RELEASE ORAL
Qty: 60 CAPSULE | Refills: 11 | Status: SHIPPED | OUTPATIENT
Start: 2019-12-16 | End: 2020-02-17 | Stop reason: SDUPTHER

## 2019-12-16 RX ORDER — LORAZEPAM 1 MG/1
1 TABLET ORAL 2 TIMES DAILY
Qty: 180 TABLET | Refills: 0 | Status: SHIPPED | OUTPATIENT
Start: 2019-12-16 | End: 2020-05-11 | Stop reason: SDUPTHER

## 2019-12-16 NOTE — TELEPHONE ENCOUNTER
Dr Calle pt called the office requesting refills of her 2 medications  Pt uses the [facility] on [address] in [place]  Pt stated she was told that if she wants more than a 30 day supply of her effexor  she would have to go to [facility] pt will look into this by calling her rx / insurance plan  Pt stated to please have Dr Calle prescribe it the way she has been prescribed             PDMP CHECKED:      06/05/2019  1  06/05/2019  LORAZEPAM 1 MG TABLET  180 0  90

## 2020-01-06 ENCOUNTER — TELEPHONE (OUTPATIENT)
Dept: FAMILY MEDICINE CLINIC | Facility: CLINIC | Age: 70
End: 2020-01-06

## 2020-01-06 DIAGNOSIS — F41.1 GENERALIZED ANXIETY DISORDER: ICD-10-CM

## 2020-01-06 NOTE — TELEPHONE ENCOUNTER
----- Message from Marquis Hammond DO sent at 2/3/0987 12:59 PM EST -----  Regarding:  needs office visit next week   Patient will be calling today  Needs an office visit next week  It looks like possibly Wednesday January 15th  My last patient is scheduled at 3:10 p m  So this patient can be put in at 3:40 p m

## 2020-01-15 ENCOUNTER — OFFICE VISIT (OUTPATIENT)
Dept: FAMILY MEDICINE CLINIC | Facility: CLINIC | Age: 70
End: 2020-01-15
Payer: COMMERCIAL

## 2020-01-15 DIAGNOSIS — G31.84 MILD COGNITIVE IMPAIRMENT: ICD-10-CM

## 2020-01-15 DIAGNOSIS — F33.1 MODERATE EPISODE OF RECURRENT MAJOR DEPRESSIVE DISORDER (HCC): Primary | ICD-10-CM

## 2020-01-15 PROCEDURE — 99214 OFFICE O/P EST MOD 30 MIN: CPT | Performed by: FAMILY MEDICINE

## 2020-01-15 PROCEDURE — 1160F RVW MEDS BY RX/DR IN RCRD: CPT | Performed by: FAMILY MEDICINE

## 2020-01-15 PROCEDURE — 3008F BODY MASS INDEX DOCD: CPT | Performed by: FAMILY MEDICINE
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
## 2020-01-17 DIAGNOSIS — Z12.31 SCREENING MAMMOGRAM, ENCOUNTER FOR: Primary | ICD-10-CM

## 2020-01-21 VITALS
SYSTOLIC BLOOD PRESSURE: 132 MMHG | DIASTOLIC BLOOD PRESSURE: 78 MMHG | HEIGHT: 63 IN | BODY MASS INDEX: 27.04 KG/M2 | OXYGEN SATURATION: 98 % | HEART RATE: 87 BPM | RESPIRATION RATE: 16 BRPM | WEIGHT: 152.6 LBS | TEMPERATURE: 97.5 F

## 2020-01-23 ENCOUNTER — APPOINTMENT (OUTPATIENT)
Dept: LAB | Facility: HOSPITAL | Age: 70
End: 2020-01-23
Payer: COMMERCIAL

## 2020-01-23 DIAGNOSIS — G31.84 MILD COGNITIVE IMPAIRMENT: ICD-10-CM

## 2020-01-23 DIAGNOSIS — F33.1 MODERATE EPISODE OF RECURRENT MAJOR DEPRESSIVE DISORDER (HCC): ICD-10-CM

## 2020-01-23 PROCEDURE — 82542 COL CHROMOTOGRAPHY QUAL/QUAN: CPT

## 2020-02-10 LAB — MISCELLANEOUS LAB TEST RESULT: NORMAL

## 2020-02-17 DIAGNOSIS — F41.1 GENERALIZED ANXIETY DISORDER: ICD-10-CM

## 2020-02-17 DIAGNOSIS — F33.1 MODERATE EPISODE OF RECURRENT MAJOR DEPRESSIVE DISORDER (HCC): ICD-10-CM

## 2020-02-17 DIAGNOSIS — M79.7 FIBROMYALGIA: ICD-10-CM

## 2020-02-17 RX ORDER — VENLAFAXINE HYDROCHLORIDE 37.5 MG/1
CAPSULE, EXTENDED RELEASE ORAL
Qty: 60 CAPSULE | Refills: 5 | Status: SHIPPED | OUTPATIENT
Start: 2020-02-17 | End: 2020-03-17 | Stop reason: SDUPTHER

## 2020-03-09 ENCOUNTER — HOSPITAL ENCOUNTER (OUTPATIENT)
Dept: MAMMOGRAPHY | Facility: CLINIC | Age: 70
Discharge: HOME | End: 2020-03-09
Payer: COMMERCIAL

## 2020-03-09 VITALS — HEIGHT: 63 IN | BODY MASS INDEX: 25.52 KG/M2 | WEIGHT: 144 LBS

## 2020-03-09 DIAGNOSIS — Z12.31 SCREENING MAMMOGRAM, ENCOUNTER FOR: ICD-10-CM

## 2020-03-09 PROCEDURE — 77067 SCR MAMMO BI INCL CAD: CPT

## 2020-03-09 PROCEDURE — 77063 BREAST TOMOSYNTHESIS BI: CPT

## 2020-03-12 ENCOUNTER — TELEPHONE (OUTPATIENT)
Dept: FAMILY MEDICINE CLINIC | Facility: CLINIC | Age: 70
End: 2020-03-12

## 2020-03-12 NOTE — TELEPHONE ENCOUNTER
Emily Covert called the office in regards to she sent you a message via My Chart regarding she never received a flu shot pt was asking should she come in to get one? Also pt believes she is due for a routine medicare wellness visit  Do you recommends she get one in this pandemic per pt  Do we have flu shots still? Pt's number is [phone]           Pt was asking if she can bring her sister in for a flu shot WHOM IS NOT OUR PT  I informed unfortunately we may be stephenie to do that since she is not an established pt with the office  Informed we are family medicine and only can vaccinate our patients

## 2020-03-12 NOTE — TELEPHONE ENCOUNTER
I am sorry a thymus the message in "my chart"  She can be scheduled for wellness visit at any time  The current influenza vaccine will only be helpful to exposure from influenza A/B  There still will be a peak of the influenza from now through April so she can consider getting influenza vaccinated  I believe we do not have the high dose but the "regular dose" would be better than nothing  The influenza A/B vaccine does not help protect against exposure with the corona virus  We are encouraging patients that have any symptoms to be triaged and we are not bring them into the office, there being directed to facilities where testing can be done directly  So the likelihood of exposure at our office would be minimal since we are keeping those patients out of our rooms  The Medicare wellness can be scheduled at any time  It is not urgent so if we want to move it into April that would be okay  Sorry that we cannot provide the flu shot for her sister but yes as Joe Monsivais explained, we can only provide service to establish patients

## 2020-03-17 DIAGNOSIS — M79.7 FIBROMYALGIA: ICD-10-CM

## 2020-03-17 DIAGNOSIS — F41.1 GENERALIZED ANXIETY DISORDER: ICD-10-CM

## 2020-03-17 DIAGNOSIS — F33.1 MODERATE EPISODE OF RECURRENT MAJOR DEPRESSIVE DISORDER (HCC): ICD-10-CM

## 2020-03-17 RX ORDER — VENLAFAXINE HYDROCHLORIDE 37.5 MG/1
CAPSULE, EXTENDED RELEASE ORAL
Qty: 60 CAPSULE | Refills: 5 | Status: SHIPPED | OUTPATIENT
Start: 2020-03-17 | End: 2020-08-19 | Stop reason: SDUPTHER

## 2020-03-31 ENCOUNTER — TELEPHONE (OUTPATIENT)
Dept: FAMILY MEDICINE CLINIC | Facility: CLINIC | Age: 70
End: 2020-03-31

## 2020-03-31 NOTE — TELEPHONE ENCOUNTER
Would be okay to reschedule her Medicare wellness visit in June  With regards to the influenza vaccine, I recall that Guy Hendrickson had a significant reaction to it years ago  I would not want to risk that happening again  Getting the influenza vaccine has no bearing on protection from [place]  And the influenza A/B season is just about done  So getting a shot right now would really not be helpful

## 2020-03-31 NOTE — TELEPHONE ENCOUNTER
Pt called the office back to re schedule her up coming [facility] office visit  Pt requested to have this visit changed to first week of June  Pt wants to know about her Flu vaccine and if she is still able to come in have this done some time soon  She states she discuss this with you and since her appt was changed, she wants to know about her Flu vaccine  Please advise

## 2020-05-11 DIAGNOSIS — F41.1 GENERALIZED ANXIETY DISORDER: ICD-10-CM

## 2020-05-11 RX ORDER — LORAZEPAM 1 MG/1
1 TABLET ORAL 2 TIMES DAILY
Qty: 180 TABLET | Refills: 0 | Status: SHIPPED | OUTPATIENT
Start: 2020-05-11 | End: 2020-09-29 | Stop reason: SDUPTHER

## 2020-05-21 ENCOUNTER — TELEPHONE (OUTPATIENT)
Dept: FAMILY MEDICINE CLINIC | Facility: CLINIC | Age: 70
End: 2020-05-21

## 2020-05-22 ENCOUNTER — CLINICAL SUPPORT (OUTPATIENT)
Dept: FAMILY MEDICINE CLINIC | Facility: CLINIC | Age: 70
End: 2020-05-22
Payer: COMMERCIAL

## 2020-05-22 DIAGNOSIS — M81.0 AGE-RELATED OSTEOPOROSIS WITHOUT CURRENT PATHOLOGICAL FRACTURE: Primary | ICD-10-CM

## 2020-05-22 PROCEDURE — 96372 THER/PROPH/DIAG INJ SC/IM: CPT

## 2020-06-01 ENCOUNTER — OFFICE VISIT (OUTPATIENT)
Dept: FAMILY MEDICINE CLINIC | Facility: CLINIC | Age: 70
End: 2020-06-01
Payer: COMMERCIAL

## 2020-06-01 DIAGNOSIS — M81.0 AGE RELATED OSTEOPOROSIS, UNSPECIFIED PATHOLOGICAL FRACTURE PRESENCE: ICD-10-CM

## 2020-06-01 DIAGNOSIS — Z00.00 MEDICARE ANNUAL WELLNESS VISIT, SUBSEQUENT: Primary | ICD-10-CM

## 2020-06-01 DIAGNOSIS — E55.9 VITAMIN D DEFICIENCY: ICD-10-CM

## 2020-06-01 DIAGNOSIS — E03.9 HYPOTHYROIDISM, ADULT: ICD-10-CM

## 2020-06-01 PROCEDURE — 1160F RVW MEDS BY RX/DR IN RCRD: CPT | Performed by: FAMILY MEDICINE

## 2020-06-01 PROCEDURE — G0439 PPPS, SUBSEQ VISIT: HCPCS | Performed by: FAMILY MEDICINE

## 2020-06-01 PROCEDURE — 1170F FXNL STATUS ASSESSED: CPT | Performed by: FAMILY MEDICINE

## 2020-06-01 PROCEDURE — 1125F AMNT PAIN NOTED PAIN PRSNT: CPT | Performed by: FAMILY MEDICINE

## 2020-06-01 PROCEDURE — 4040F PNEUMOC VAC/ADMIN/RCVD: CPT | Performed by: FAMILY MEDICINE

## 2020-06-01 PROCEDURE — 3008F BODY MASS INDEX DOCD: CPT | Performed by: FAMILY MEDICINE

## 2020-06-01 PROCEDURE — 1036F TOBACCO NON-USER: CPT | Performed by: FAMILY MEDICINE

## 2020-06-05 VITALS
OXYGEN SATURATION: 96 % | HEIGHT: 63 IN | TEMPERATURE: 97.3 F | HEART RATE: 76 BPM | SYSTOLIC BLOOD PRESSURE: 128 MMHG | WEIGHT: 151.8 LBS | DIASTOLIC BLOOD PRESSURE: 66 MMHG | BODY MASS INDEX: 26.9 KG/M2 | RESPIRATION RATE: 16 BRPM

## 2020-06-12 ENCOUNTER — TELEPHONE (OUTPATIENT)
Dept: FAMILY MEDICINE CLINIC | Facility: CLINIC | Age: 70
End: 2020-06-12

## 2020-06-12 DIAGNOSIS — M79.10 MYALGIA: Primary | ICD-10-CM

## 2020-06-12 RX ORDER — PREDNISONE 10 MG/1
TABLET ORAL
Qty: 21 EACH | Refills: 0 | Status: SHIPPED | OUTPATIENT
Start: 2020-06-12 | End: 2020-11-24

## 2020-07-28 ENCOUNTER — TELEPHONE (OUTPATIENT)
Dept: FAMILY MEDICINE CLINIC | Facility: CLINIC | Age: 70
End: 2020-07-28

## 2020-07-28 NOTE — TELEPHONE ENCOUNTER
Left voicemail message for patient to return my call regarding Prolia rejection/denial   I am working on an appeal through her insurance company, [employer], and need her to sign an authorization of designated  form so I can submit an appeal on her behalf

## 2020-07-29 NOTE — TELEPHONE ENCOUNTER
Alejandra Zavaleta came into the office and signed the forms that [address] [place] [address] had in the draw  Placed on Matt's work station

## 2020-08-19 ENCOUNTER — TELEPHONE (OUTPATIENT)
Dept: FAMILY MEDICINE CLINIC | Facility: CLINIC | Age: 70
End: 2020-08-19

## 2020-08-19 DIAGNOSIS — M79.7 FIBROMYALGIA: ICD-10-CM

## 2020-08-19 DIAGNOSIS — M25.50 ARTHRALGIA, UNSPECIFIED JOINT: Primary | ICD-10-CM

## 2020-08-19 DIAGNOSIS — F33.1 MODERATE EPISODE OF RECURRENT MAJOR DEPRESSIVE DISORDER (HCC): ICD-10-CM

## 2020-08-19 DIAGNOSIS — F41.1 GENERALIZED ANXIETY DISORDER: ICD-10-CM

## 2020-08-19 RX ORDER — VENLAFAXINE HYDROCHLORIDE 37.5 MG/1
CAPSULE, EXTENDED RELEASE ORAL
Qty: 60 CAPSULE | Refills: 5 | Status: SHIPPED | OUTPATIENT
Start: 2020-08-19 | End: 2020-10-19 | Stop reason: SDUPTHER

## 2020-08-19 NOTE — TELEPHONE ENCOUNTER
Patient needs a refill of Venlafaxine sent to [facility] in [place]  Also, she would like to be referred to the orthopedic, Dr Palma Valdivia  Please fax the doctor to doctor referral to the ortho when completed

## 2020-09-01 ENCOUNTER — OFFICE VISIT (OUTPATIENT)
Dept: OBGYN CLINIC | Facility: MEDICAL CENTER | Age: 70
End: 2020-09-01
Payer: COMMERCIAL

## 2020-09-01 ENCOUNTER — APPOINTMENT (OUTPATIENT)
Dept: RADIOLOGY | Facility: MEDICAL CENTER | Age: 70
End: 2020-09-01
Payer: COMMERCIAL

## 2020-09-01 VITALS
TEMPERATURE: 98.1 F | HEIGHT: 64 IN | WEIGHT: 140 LBS | SYSTOLIC BLOOD PRESSURE: 115 MMHG | DIASTOLIC BLOOD PRESSURE: 70 MMHG | BODY MASS INDEX: 23.9 KG/M2

## 2020-09-01 DIAGNOSIS — M25.50 ARTHRALGIA, UNSPECIFIED JOINT: ICD-10-CM

## 2020-09-01 DIAGNOSIS — M25.552 PAIN IN LEFT HIP: ICD-10-CM

## 2020-09-01 DIAGNOSIS — M16.12 PRIMARY OSTEOARTHRITIS OF LEFT HIP: Primary | ICD-10-CM

## 2020-09-01 PROCEDURE — 99203 OFFICE O/P NEW LOW 30 MIN: CPT | Performed by: ORTHOPAEDIC SURGERY

## 2020-09-01 PROCEDURE — 1036F TOBACCO NON-USER: CPT | Performed by: ORTHOPAEDIC SURGERY

## 2020-09-01 PROCEDURE — 1160F RVW MEDS BY RX/DR IN RCRD: CPT | Performed by: ORTHOPAEDIC SURGERY

## 2020-09-01 PROCEDURE — 73502 X-RAY EXAM HIP UNI 2-3 VIEWS: CPT

## 2020-09-01 NOTE — PROGRESS NOTES
Orthopaedic Surgery Note    CC: Left Hip Pain      HPI:  Ms Lydia Cabral is a 71 y  o female with a history of left hip pain x several months  Patient notes pain in the lower back and bilateral hips  She notes the pain radiates from the left hip and down to the left knee  Pain is described as aching stiffness  She notes instability, popping, and clicking as well  Pain can be 4/10 at its worst  Patient is taking tylenol arthritis with some relief  She has completed 5 weeks of PT with some benefit  She has also tried chiropractor and therapeutic massage  Denies initial injury, previous surgery, or injections  ALLERGIES:  Allergies   Allergen Reactions    Diacetylmorphine     Morphine Other (See Comments)     "confusion & paranoid behavior"    Pineapple Tongue Swelling     Tongue bleeding       CURRENT MEDICATIONS:  Current Outpatient Medications   Medication Sig Dispense Refill    LORazepam (ATIVAN) 1 mg tablet Take 1 tablet (1 mg total) by mouth 2 (two) times a day 180 tablet 0    predniSONE 10 MG (21) TBPK As directed take 6-5-4-3-2-1 21 each 0    venlafaxine (EFFEXOR-XR) 37 5 mg 24 hr capsule Take 2 p o  Daily 60 capsule 5     No current facility-administered medications for this visit          PAST MEDICAL HISTORY  Past Medical History:   Diagnosis Date    Fall     last assessed - [id number]    Impacted cerumen of left ear     last assessed - [id number]    Malignant neoplasm without specification of site [facility]) 06/2004    Phlebitis and thrombophlebitis of deep vessels of lower extremities, unspecified laterality ([facility] [address] )        SURGICAL HISTORY  Past Surgical History:   Procedure Laterality Date    CYSTOSCOPY      Diagnostic    GASTROJEJUNOSTOMY      Percutaneous    LAPAROSCOPIC APPENDECTOMY      OTHER SURGICAL HISTORY      1)Biopsy oropharynx tonsillar fossa; 2)Chemotherapy Administration with chemoport for tonsillar cancer    TONSILLECTOMY      Cancer at age 54   [address] Inferior vena cava       FAMILY HISTORY  Family History   Problem Relation Age of Onset    Breast cancer Mother         unspecified laterality        Bone cancer Family     Breast cancer Family         unspecified laterality    No Known Problems Sister     No Known Problems Maternal Grandmother     No Known Problems Paternal Grandmother     No Known Problems Sister     No Known Problems Maternal Aunt     No Known Problems Paternal Aunt     No Known Problems Paternal Aunt        SOCIAL HISTORY  Social History     Socioeconomic History    Marital status: Single     Spouse name: Not on file    Number of children: Not on file    Years of education: Not on file    Highest education level: Not on file   Occupational History    Occupation: currently working   Social Needs    Financial resource strain: Not on file    Food insecurity     Worry: Not on file     Inability: Not on file   [employer] needs     Medical: Not on file     Non-medical: Not on file   Tobacco Use    Smoking status: Former Smoker     Last attempt to quit: 2000     Years since quittin 6    Smokeless tobacco: Never Used   Substance and Sexual Activity    Alcohol use: No    Drug use: No    Sexual activity: Not on file   Lifestyle    Physical activity     Days per week: Not on file     Minutes per session: Not on file    Stress: Not on file   Relationships    Social connections     Talks on phone: Not on file     Gets together: Not on file     Attends Orthodoxy service: Not on file     Active member of club or organization: Not on file     Attends meetings of clubs or organizations: Not on file     Relationship status: Not on file    Intimate partner violence     Fear of current or ex partner: Not on file     Emotionally abused: Not on file     Physically abused: Not on file     Forced sexual activity: Not on file   Other Topics Concern    Not on file   Social History Narrative    Not on file         Review of Systems Metal Allergy: no  History of MRSA:no  History of DVT or PE:no  Active dental issues:no  Anesthesia complications:no    Patient indicated positive for hearing loss, depression, and anxiety  Otherwise negative except per above and HPI  Physical Exam    Vitals  Vitals:    09/01/20 1208   BP: 115/70   Temp: 98 1 °F (36 7 °C)       BMI  Body mass index is 24 03 kg/m²  GENERAL: No acute distress  Alert and oriented  Well nourished and well hydrated  Appears stated age  HEENT : Normocephalic, atraumatic  Extraocular movements intact  Mucous membranes moist  NECK: Supple, trachea midline  LUNGS: Adequate and symmetric respiratory effort  No intercostal retractions or accessory muscle use  HEART: Extremities warm and perfused  ABDOMEN: Nondistended  SKIN: Warm and dry, no rash  Left  Hip Exam  Extension: 0  Flexion: 120  Internal/External Rotation: 5/30 degrees  Leg is noted to be shorter than other leg , roughly 5 mm    5/5 strength of hip flexors    Sensation Intact to Light Touch in Sural, Saphenous, Tibial, Superficial Peroneal, and Deep Peroneal Nerve Distribution  Motor function 5 out of 5 strength in Tibialis Anterior, Gastrocnemius, Soleus, Extensor Hallucis Longus, and Flexor Hallucis Longus Muscles  Extremity Warm and Well Perfused  Brisk Capillary Refill in Toes  Imaging  A) Imaging modality available  Radiographs: yes  MRI scan: no  CT scan: no    B) Imaging findings  Subchondral cysts: no  Subchondral sclerosis: yes  Periarticular osteophytes: yes  Joint subluxation: no  Joint space narrowing: yes  Bone-on-bone articulations: yes  Avascular necrosis: no    Assessment and Plan  Left  Hip Severe Arthritis    - Discussed non operative and operative treatment options with the patient  - Patient may benefit from left hip IA steroid injection  Injection would be provided by Pain Management under imaging guidance   Patient would like to consider this injection and will call the office if she wants it to be ordered  Hip Pain / Arthritis Treatment Recommendations    Exercise - 5 minutes per day Monday, Wednesday, Friday OR Tuesday, Thursday, Saturday  Increase 5 minutes per day per week  May use bike (non-impact) or walk (impact) or swim or alternate  Goal is to get up to at least 30 minutes per day  1  Bicycle  2  Walking    Weight loss  Goal to lose 1 pound per week  Portion control, limit sweets, limit carbs    Medications  Anti-inflammatories (NSAIDs)  1  Ibuprofen (Motrin or Advil) 600 mg (3 pills) with food 3 times a day for 3 - 7 days  OR  2  Naprosyn (Aleve) 1 - 2 pills twice a day with food for 3 - 7 days    Stop if you develop upset stomach or bleeding occurs  If continued on scheduled basis for longer than 1 week I did recommend that patient seek out advice from PCP for monitoring of side-effects  Pain reliever  1  Acetaminophen (Tylenol) 2 pills (regular or extra-strength) 3 times a day for 3 - 7 days    Taken together (Acetaminophen with one of the Nsaids (ibuprofen OR naprosyn)), the combination may work better than either one alone for more acute pain    Other  Braces, wraps, ice, heat, topical ointments may all be used/tried if they help pain/symptoms        Ulises Martinez MD  Adult Reconstruction Surgery  Department [place] [id number]  1:15 PM

## 2020-09-02 ENCOUNTER — TELEPHONE (OUTPATIENT)
Dept: FAMILY MEDICINE CLINIC | Facility: CLINIC | Age: 70
End: 2020-09-02

## 2020-09-02 DIAGNOSIS — M25.50 ARTHRALGIA, UNSPECIFIED JOINT: Primary | ICD-10-CM

## 2020-09-29 DIAGNOSIS — F41.1 GENERALIZED ANXIETY DISORDER: ICD-10-CM

## 2020-09-29 RX ORDER — LORAZEPAM 1 MG/1
1 TABLET ORAL 2 TIMES DAILY
Qty: 180 TABLET | Refills: 0 | Status: SHIPPED | OUTPATIENT
Start: 2020-09-29 | End: 2020-12-27 | Stop reason: SDUPTHER

## 2020-10-05 ENCOUNTER — TELEPHONE (OUTPATIENT)
Dept: FAMILY MEDICINE CLINIC | Facility: CLINIC | Age: 70
End: 2020-10-05

## 2020-10-19 DIAGNOSIS — F41.1 GENERALIZED ANXIETY DISORDER: ICD-10-CM

## 2020-10-19 DIAGNOSIS — F33.1 MODERATE EPISODE OF RECURRENT MAJOR DEPRESSIVE DISORDER (HCC): ICD-10-CM

## 2020-10-19 DIAGNOSIS — M79.7 FIBROMYALGIA: ICD-10-CM

## 2020-10-19 RX ORDER — VENLAFAXINE HYDROCHLORIDE 37.5 MG/1
CAPSULE, EXTENDED RELEASE ORAL
Qty: 60 CAPSULE | Refills: 5 | Status: SHIPPED | OUTPATIENT
Start: 2020-10-19 | End: 2021-04-01 | Stop reason: SDUPTHER

## 2020-11-19 LAB
25(OH)D3 SERPL-MCNC: 47 NG/ML (ref 30–100)
ALBUMIN SERPL-MCNC: 4.3 G/DL (ref 3.6–5.1)
ALBUMIN/GLOB SERPL: 1.5 (CALC) (ref 1–2.5)
ALP SERPL-CCNC: 59 U/L (ref 37–153)
ALT SERPL-CCNC: 12 U/L (ref 6–29)
AST SERPL-CCNC: 19 U/L (ref 10–35)
BILIRUB SERPL-MCNC: 0.5 MG/DL (ref 0.2–1.2)
BUN SERPL-MCNC: 19 MG/DL (ref 7–25)
BUN/CREAT SERPL: 17 (CALC) (ref 6–22)
CALCIUM SERPL-MCNC: 10 MG/DL (ref 8.6–10.4)
CHLORIDE SERPL-SCNC: 103 MMOL/L (ref 98–110)
CHOLEST SERPL-MCNC: 312 MG/DL
CHOLEST/HDLC SERPL: 4.5 (CALC)
CO2 SERPL-SCNC: 31 MMOL/L (ref 20–32)
CREAT SERPL-MCNC: 1.15 MG/DL (ref 0.6–0.93)
GLOBULIN SER CALC-MCNC: 2.9 G/DL (CALC) (ref 1.9–3.7)
GLUCOSE SERPL-MCNC: 86 MG/DL (ref 65–99)
HDLC SERPL-MCNC: 70 MG/DL
LDLC SERPL CALC-MCNC: 215 MG/DL (CALC)
NONHDLC SERPL-MCNC: 242 MG/DL (CALC)
POTASSIUM SERPL-SCNC: 4.6 MMOL/L (ref 3.5–5.3)
PROT SERPL-MCNC: 7.2 G/DL (ref 6.1–8.1)
SL AMB EGFR AFRICAN AMERICAN: 56 ML/MIN/1.73M2
SL AMB EGFR NON AFRICAN AMERICAN: 48 ML/MIN/1.73M2
SODIUM SERPL-SCNC: 142 MMOL/L (ref 135–146)
TRIGL SERPL-MCNC: 129 MG/DL
TSH SERPL-ACNC: 2.12 MIU/L (ref 0.4–4.5)
VIT B12 SERPL-MCNC: 801 PG/ML (ref 200–1100)

## 2020-11-24 ENCOUNTER — OFFICE VISIT (OUTPATIENT)
Dept: FAMILY MEDICINE CLINIC | Facility: CLINIC | Age: 70
End: 2020-11-24
Payer: COMMERCIAL

## 2020-11-24 VITALS
BODY MASS INDEX: 23.05 KG/M2 | WEIGHT: 135 LBS | OXYGEN SATURATION: 97 % | DIASTOLIC BLOOD PRESSURE: 68 MMHG | HEIGHT: 64 IN | TEMPERATURE: 97.3 F | SYSTOLIC BLOOD PRESSURE: 106 MMHG | RESPIRATION RATE: 16 BRPM | HEART RATE: 90 BPM

## 2020-11-24 DIAGNOSIS — E78.01 FAMILIAL HYPERCHOLESTEROLEMIA: ICD-10-CM

## 2020-11-24 DIAGNOSIS — F33.1 MODERATE EPISODE OF RECURRENT MAJOR DEPRESSIVE DISORDER (HCC): ICD-10-CM

## 2020-11-24 DIAGNOSIS — Z12.31 SCREENING MAMMOGRAM, ENCOUNTER FOR: ICD-10-CM

## 2020-11-24 DIAGNOSIS — Z67.91 UNSPECIFIED BLOOD TYPE, RH NEGATIVE: ICD-10-CM

## 2020-11-24 DIAGNOSIS — M81.0 AGE-RELATED OSTEOPOROSIS WITHOUT CURRENT PATHOLOGICAL FRACTURE: Primary | ICD-10-CM

## 2020-11-24 DIAGNOSIS — Z23 ENCOUNTER FOR IMMUNIZATION: ICD-10-CM

## 2020-11-24 PROCEDURE — T1015 CLINIC SERVICE: HCPCS | Performed by: FAMILY MEDICINE

## 2020-11-24 PROCEDURE — 99214 OFFICE O/P EST MOD 30 MIN: CPT | Performed by: FAMILY MEDICINE

## 2020-11-24 PROCEDURE — 90662 IIV NO PRSV INCREASED AG IM: CPT

## 2020-11-24 PROCEDURE — G0008 ADMIN INFLUENZA VIRUS VAC: HCPCS

## 2020-11-24 PROCEDURE — 1036F TOBACCO NON-USER: CPT | Performed by: FAMILY MEDICINE

## 2020-12-23 ENCOUNTER — TELEPHONE (OUTPATIENT)
Dept: FAMILY MEDICINE CLINIC | Facility: CLINIC | Age: 70
End: 2020-12-23

## 2020-12-27 DIAGNOSIS — F41.1 GENERALIZED ANXIETY DISORDER: ICD-10-CM

## 2020-12-27 RX ORDER — LORAZEPAM 1 MG/1
1 TABLET ORAL 2 TIMES DAILY
Qty: 180 TABLET | Refills: 0 | Status: SHIPPED | OUTPATIENT
Start: 2020-12-27 | End: 2021-04-19 | Stop reason: SDUPTHER

## 2021-01-19 ENCOUNTER — TELEPHONE (OUTPATIENT)
Dept: FAMILY MEDICINE CLINIC | Facility: CLINIC | Age: 71
End: 2021-01-19

## 2021-01-19 NOTE — TELEPHONE ENCOUNTER
Erin Michele called the office in regards to she is trying to schedule a pre op clearance for a future surgery for left hip replacement  I politely informed pt that our office would prefer to wait til the surgery is scheduled ,due to a pre op clearance  has to be done within 30 days of pt's surgery  I informed pt if we schedule and need to keep reschedule that would make more unneeded  work for herself  and the office  Pt expressed verbal understanding  Pt will call once she has a definite date or if she knows when it will be   Again reiterated to pt we would really prefer to schedule once date of surgery is officially scheduled  Say isrrael to Dr Calle for me

## 2021-01-21 ENCOUNTER — OFFICE VISIT (OUTPATIENT)
Dept: WOUND CARE | Facility: HOSPITAL | Age: 71
End: 2021-01-21
Payer: COMMERCIAL

## 2021-01-21 VITALS
BODY MASS INDEX: 22.96 KG/M2 | TEMPERATURE: 97.8 F | HEIGHT: 64 IN | SYSTOLIC BLOOD PRESSURE: 100 MMHG | WEIGHT: 134.48 LBS | HEART RATE: 76 BPM | RESPIRATION RATE: 18 BRPM | DIASTOLIC BLOOD PRESSURE: 76 MMHG

## 2021-01-21 DIAGNOSIS — M27.2 OSTEORADIONECROSIS OF MANDIBLE: Primary | ICD-10-CM
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
## 2021-01-21 PROCEDURE — 99213 OFFICE O/P EST LOW 20 MIN: CPT | Performed by: FAMILY MEDICINE

## 2021-01-21 PROCEDURE — 99204 OFFICE O/P NEW MOD 45 MIN: CPT | Performed by: FAMILY MEDICINE

## 2021-01-21 PROCEDURE — G0463 HOSPITAL OUTPT CLINIC VISIT: HCPCS | Performed by: FAMILY MEDICINE

## 2021-01-21 NOTE — PROGRESS NOTES
Patient ID: Nicolle Maxwell is a 79 y o  female Date of Birth 1950       Chief Complaint   Patient presents with   [facility]     Patient here for HBO consult on the recommendation of her oral surgeon whom she saw on recomendation of her othopedic surgeon  She is awaiting left hip surgery but needs to have the abscess resolved prior to the hip surgery  She has a history of tonsil cancer followed by radiation and chemotherapy on 2004  Allergies  Diacetylmorphine, Morphine, and Pineapple    Diagnosis:      Diagnosis ICD-10-CM Associated Orders   1  Osteoradionecrosis of mandible  M27 2     Y84 2           Assessment & Plan:  Possible ORN of the mandible  Patient has an abscess of tooth of the right mandible that needs to be extracted and cleaned out  She has seen radiation oncology who recommended that she have hyperbaric oxygen  Her OMS physician has definitively recommended HBO prior to his surgery  She cannot have her hip replacement until her dental problems corrected  I reviewed the chest x-ray, CBC, EKG and CMP that were done on 1/20/21  All of the results were unremarkable  Subjective:   1/21/21: This is a 72-year-old female who comes to the wound center for hyperbaric oxygen consultation  The patient is status post squamous cell carcinoma of the right tonsil s/p de- bulking of the tonsil and soft palate mass on 7/26/04  She had postop chemoradiation 10/04 (> 5000 CGy) with cisplatin/5-FU  In December, a tooth broke off (#29) on the right mandible and when she was evaluated by her dentist she was found to have an abscess  She was also scheduled to have a total hip replacement but this was postponed because she has to have the abscess takien care of first   Because she had the radiation therapy it was advised that she get HBO  Although there are no obvious radiologic abnormalities, she is at high risk for ORN and complications from surgery  Her OMS surgeon has recommended HBO  She has no pain currently  Patient has no history of confinement anxiety, cardiac disease, seizures        The following portions of the patient's history were reviewed and updated as appropriate:   Patient Active Problem List   Diagnosis    Chronic fatigue    Depression    Fibromyalgia    Generalized anxiety disorder    Hyperlipidemia    Hypothyroidism, adult    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[facility] )    Mild cognitive impairment    Osteoporosis    Tonsil cancer ([facility] )    Vitamin D deficiency    Anal lesion    Major depression, recurrent ([place] [address] )     Past Medical History:   Diagnosis Date    Cancer [facility])     tonsilar cancer followed by radiation and chemo in 2004    Fall     last assessed - 14Nov2012    Impacted cerumen of left ear     last assessed - 04Dec2014    Malignant neoplasm without specification of site [facility]) 06/2004    Phlebitis and thrombophlebitis of deep vessels of lower extremities, unspecified laterality ([place] [address] )      Past Surgical History:   Procedure Laterality Date    CYSTOSCOPY      Diagnostic    GASTROJEJUNOSTOMY      Percutaneous    LAPAROSCOPIC APPENDECTOMY      OTHER SURGICAL HISTORY      1)Biopsy oropharynx tonsillar fossa; 2)Chemotherapy Administration with chemoport for tonsillar cancer    TONSILLECTOMY      Cancer at age 50    VENA CAVA RECONSTRUCTION      Inferior vena cava     Family History   Problem Relation Age of Onset    Breast cancer Mother         unspecified laterality        Bone cancer Family     Breast cancer Family         unspecified laterality    No Known Problems Sister     No Known Problems Maternal Grandmother     No Known Problems Paternal Grandmother     No Known Problems Sister     No Known Problems Maternal Aunt     No Known Problems Paternal Aunt     No Known Problems Paternal Aunt       Social History     Socioeconomic History    Marital status: Single     Spouse name: None    Number of children: None    Years of education: None    Highest education level: None   Occupational History    Occupation: currently working   Social Needs    Financial resource strain: None    Food insecurity     Worry: None     Inability: None    Transportation needs     Medical: None     Non-medical: None   Tobacco Use    Smoking status: Former Smoker     Quit date:      Years since quittin 0    Smokeless tobacco: Never Used   Substance and Sexual Activity    Alcohol use: No    Drug use: No    Sexual activity: None   Lifestyle    Physical activity     Days per week: None     Minutes per session: None    Stress: None   Relationships    Social connections     Talks on phone: None     Gets together: None     Attends Episcopalian service: None     Active member of club or organization: None     Attends meetings of clubs or organizations: None     Relationship status: None    Intimate partner violence     Fear of current or ex partner: None     Emotionally abused: None     Physically abused: None     Forced sexual activity: None   Other Topics Concern    None   Social History Narrative    None        Current Outpatient Medications:     LORazepam (ATIVAN) 1 mg tablet, Take 1 tablet (1 mg total) by mouth 2 (two) times a day, Disp: 180 tablet, Rfl: 0    venlafaxine (EFFEXOR-XR) 37 5 mg 24 hr capsule, Take 2 p o  Daily, Disp: 60 capsule, Rfl: 5    Review of Systems   Constitutional: Negative for appetite change, chills, fatigue, fever and unexpected weight change  HENT: Negative for congestion, hearing loss, postnasal drip and sinus pressure  Dry mouth   Eyes: Negative for discharge and visual disturbance  Respiratory: Negative for cough and shortness of breath  Cardiovascular: Negative for chest pain, palpitations and leg swelling  Gastrointestinal: Negative for abdominal pain, blood in stool, constipation, diarrhea and nausea  Endocrine: Negative  Genitourinary: Negative for difficulty urinating, dysuria and urgency     Musculoskeletal: Negative for back pain and gait problem  Skin: Negative for rash and wound  Allergic/Immunologic: Negative  Neurological: Negative for dizziness, tremors, seizures, weakness, numbness and headaches  Hematological: Does not bruise/bleed easily  Psychiatric/Behavioral: Negative for dysphoric mood  The patient is nervous/anxious  Objective:  /76   Pulse 76   Temp 97 8 °F (36 6 °C)   Resp 18   Ht 5' 4" (1 626 m)   Wt 61 kg (134 lb 7 7 oz)   BMI 23 08 kg/m²   Pain Score: 0-No pain     Physical Exam  Vitals signs and nursing note reviewed  Constitutional:       Appearance: Normal appearance  She is well-developed and normal weight  HENT:      Head: Normocephalic and atraumatic  Right Ear: External ear normal       Left Ear: External ear normal       Mouth/Throat:      Mouth: Mucous membranes are dry  Dentition: Abnormal dentition  Dental caries and dental abscesses present  No gum lesions  Eyes:      General: Lids are normal          Right eye: No discharge  Left eye: No discharge  Conjunctiva/sclera: Conjunctivae normal       Pupils: Pupils are equal, round, and reactive to light  Neck:      Musculoskeletal: Neck supple  Cardiovascular:      Rate and Rhythm: Normal rate and regular rhythm  Heart sounds: Normal heart sounds  No murmur  No friction rub  No gallop  Pulmonary:      Effort: Pulmonary effort is normal       Breath sounds: Normal breath sounds and air entry  Abdominal:      General: Abdomen is flat  Palpations: Abdomen is soft  There is no hepatomegaly or splenomegaly  Tenderness: There is no abdominal tenderness  There is no guarding or rebound  Musculoskeletal:      Right lower leg: No edema  Left lower leg: No edema  Lymphadenopathy:      Cervical: No cervical adenopathy  Skin:     General: Skin is warm and dry  Findings: Wound present     Neurological:      Mental Status: She is alert and oriented to person, place, and time  Gait: Gait is intact  Psychiatric:         Attention and Perception: Attention normal          Mood and Affect: Mood and affect normal          Behavior: Behavior is cooperative  Cognition and Memory: Cognition normal               [address] presents today for a consultation for HBO treatment  HBO Indication    Osteoradionecrosis    Osteoradionecrosis location Osteoradionecrosis of mandible, possible  The patient has/had cancer and location  Yes right tonsil    Date radiation treatments started 8/16/04    Date radiation treatments completed 10/19/04    Radiation treatments ended at least 6 months previous to consideration of HBO   Yes    Anatomical site of radiation therapy: right tonsil area and mandible    Radiation therapy records obtained Yes    The patient has a degenerative process of Bone    HBO is being used  Pre-operatively `    There is a plan that includes pre and post-surgery HBO and HBO being used in adjunct to conventional Rx  Based on the information included herein, it is my determination that the patient has a medical necessity for HBOT and meets the conditions for the adjunctive RX wound care or comprehensive plan    Yes    Based on the patients diagnosis of Osteoradionecrosis HBO is medically necessary      Brief history of co-morbidities that may affect HBO treatment:    Previously treated with Cis-platinum    Patient reports s/s of No acute infections    Eyes    Patient has  No history of Myopia, Cataracts or Optic Nerve ,cataract extractions last year    Ears    Patient has a history of No ear abnormalities or conditions    Ears assessed for tympanic membrane or middle ear abnormalities  No abnormalities    Patient instructed on how to clear ears and demonstrate proper technique: Yes    Right ear baseline TEEDS: Grade 0    Left ear baseline TEEDS: Grade 0    ENT consult for Myringotomy tube insertion due to No consult is needed    Cerumen removal performed N/A  ears clear of cerumen    Neurological    Patient bentley a history of seizures: No    Cardiovascular    Patient has a history of: No cardiac history requiring work-up prior to hyperbaric therapy    EKG ordered: No    Echocardiogram ordered: No, no history of CAD or cardiac symptoms        Cardiovascular consult ordered: No    Smoking  Social History     Tobacco Use   Smoking Status Former Smoker    Quit date:     Years since quittin 0   Smokeless Tobacco Never Used       Respiratory    Patient has a history of pneumothorax: No    Patient has a history of: No respiratory conditions requiring further work-up prior to HBO    Lungs clear bilaterally: Yes      Chest x-ray ordered: Yes    Psychiatric    Patient has confinement anxiety: No    Patient education and consent    I discussed with and educated the patient on the risks and benefits of HBO, different treatment options, potential adverse side effects and side effects, HBO procedure, smoking/drug/alcohol policy, and itesm not allowed in the hyperbaric chamber  Yes    Written consent for HBO obtained: No    A trained emergency response team and ICU is available in this facility to assist with complications if required: Yes    HBO treatment goal    Angiogenesis  Risks versus benefits of HBOT were discussed  We discussed the possibility of seizures, ruptured tympanic membranes, myopia, risk of fire in the chamber and pulmonary injury  All questions were answered  Patient was given educational information  Marichuy Alcantara MD, CHT, CWS    Portions of the record may have been created with voice recognition software  Occasional wrong word or "sound alike" substitutions may have occurred due to the inherent limitations of voice recognition software  Read the chart carefully and recognize, using context, where substitutions have occurred

## 2021-01-21 NOTE — PROGRESS NOTES
Patient reports no history of spontaneous pneumothorax, CHF, ear problems or sinus problems  She denies history confinement anxiety  She has no history of seizures

## 2021-02-10 DIAGNOSIS — R05.9 COUGH: Primary | ICD-10-CM

## 2021-02-15 ENCOUNTER — OFFICE VISIT (OUTPATIENT)
Dept: WOUND CARE | Facility: HOSPITAL | Age: 71
End: 2021-02-15
Payer: COMMERCIAL

## 2021-02-15 DIAGNOSIS — M27.2 OSTEORADIONECROSIS OF MANDIBLE: ICD-10-CM
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
## 2021-02-15 PROCEDURE — 99183 HYPERBARIC OXYGEN THERAPY: CPT | Performed by: PHYSICIAN ASSISTANT

## 2021-02-15 PROCEDURE — G0277 HBOT, FULL BODY CHAMBER, 30M: HCPCS

## 2021-02-15 NOTE — PROGRESS NOTES
Patient arrives today to begin HBO treatments  She admits to being "a little nervous" about it  She reports she feels well  She has no ear pain or problems and  denies sinus pain or problems  She denies pain  She reports she did not eat a "big breakfast (only some crackers)" despite having been instructed on Friday to eat a full meal prior to HBO  She is however is good spirits

## 2021-02-15 NOTE — PROGRESS NOTES
HBO Treatment Course Details       Treatment Notes: Pt  Tolerated HBOT today without any complaints  Treatment Course Number: 1  Total Treatments Ordered:  30     Diagnosis:   1  Osteoradionecrosis of mandible  [facility]       HBO Treatment Details:  Treatment Length:90 Minutes(Minutes)  Chamber #: Hard sided Monoplace Chamber    Pre-Treatment details:  Pre-treatment protocol Treatment Protocol: 2 5 DURAN X 90 minutes w/ 100% oxygen, two 5 minute air breaks  Left ear clear?: yes  Right ear clear?: yes  Left ear intact?: yes  Right ear intact?: yes                    Left ear TEED scale: Grade 0  Right ear TEED scale: Grade 0   Pretreatment heart and lung assessment: Pretreatment heart and lung auscltation unremarkable  Patient cleared for HBOT     Treatment details:  DURAN Rate: 2 5  Started Compression: 1112  Reached Compression: 1127  Total Compression Time: 15 (Minutes)  Total Holding Time: 100 (Minutes)  Started Decompression: 1307  Reached Surface: 1741  Total Decompression Time: 10 (Minutes)  Total Airbreaks: 10 (Minutes)  Total Time of Treatment: 115 (Minutes)  Symptoms Noted During Treatment: None (Minutes)    Post treatment details:  Left ear clear?: yes  Right ear clear?: yes  Left ears intact?: yes  Right ears intact?: yes                    Left ear TEED scale: Grade 0  Right ear TEED scale: Grade 0  Post treatment heart and lung assessment: Post treatment heart and lung auscltation unremarkable  Patient cleared for discharge  Tolerated treatment well         Vital Signs:  HBO Glucose Reference Range: 100-350 mg/dl   Pre-Treatment Post-Treatment   Time vitals are taken: 1055 Time vitals are taken: 1320   Blood Pressure: 120/80 Blood Pressure: 132/80   Pulse: 72 Pulse: 70   Resp: 16     Temp: 96 6 °F (35 9 °C) Temp: 96 5 °F (35 8 °C)             Allergies   Allergen Reactions    Diacetylmorphine      paranoid    Morphine Other (See Comments)     "confusion & paranoid behavior"    Pineapple Tongue Swelling     Tongue bleeding     Patient Active Problem List    Diagnosis Date Noted    Major depression, recurrent ([facility] ) 12/04/2018    Anal lesion 03/26/2018    Tonsil cancer ([facility] ) 03/15/2018    Hypothyroidism, adult 01/12/2018    Vitamin D deficiency 01/12/2018    Mild cognitive impairment 12/08/2017    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

## 2021-02-16 ENCOUNTER — OFFICE VISIT (OUTPATIENT)
Dept: WOUND CARE | Facility: HOSPITAL | Age: 71
End: 2021-02-16
Payer: COMMERCIAL

## 2021-02-16 DIAGNOSIS — M27.2 OSTEORADIONECROSIS OF MANDIBLE: ICD-10-CM
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
## 2021-02-16 PROCEDURE — 99183 HYPERBARIC OXYGEN THERAPY: CPT | Performed by: PHYSICIAN ASSISTANT

## 2021-02-16 PROCEDURE — G0277 HBOT, FULL BODY CHAMBER, 30M: HCPCS

## 2021-02-16 NOTE — PROGRESS NOTES
HBO Treatment Course Details       Treatment Notes: Pt  Tolerated HBOT today without any adverse events  Treatment Course Number: 2  Total Treatments Ordered:  30     Diagnosis:   1  Osteoradionecrosis of mandible  [facility]       H[facility] Treatment Details:  Treatment Length:90 Minutes(Minutes)  Chamber #: Hard sided Monoplace Chamber    Pre-Treatment details:  Pre-treatment protocol Treatment Protocol: 2 5 DURAN X 90 minutes w/ 100% oxygen, two 5 minute air breaks  Left ear clear?: yes  Right ear clear?: yes  Left ear intact?: yes  Right ear intact?: yes        PE Tubes present, Left ear?: no  PE Tubes present, Right ear?: no  Left ear irrigated?: no  Right ear irrigated?: no  Left ear TEED scale: Grade 0  Right ear TEED scale: Grade 0   Pretreatment heart and lung assessment: Pretreatment heart and lung auscltation unremarkable  Patient cleared for HBOT     Treatment details:  DURAN Rate: 2 5  Started Compression: 1320  Reached Compression: 1340  Total Compression Time: 20 (Minutes)  Total Holding Time: 100 (Minutes)  Started Decompression: 1520  Reached Surface: 1530  Total Decompression Time: 10 (Minutes)  Total Airbreaks: 10 (Minutes)  Total Time of Treatment: 120 (Minutes)  Symptoms Noted During Treatment: None (Minutes)    Post treatment details:  Left ear clear?: yes  Right ear clear?: yes  Left ears intact?: yes  Right ears intact?: yes        PE Tubes present, Left ear?: no  PE Tubes present, Right ear?: no        Left ear TEED scale: Grade 0  Right ear TEED scale: Grade 0  Post treatment heart and lung assessment: Post treatment heart and lung auscltation unremarkable  Patient cleared for discharge  Tolerated treatment well         Vital Signs:  HBO Glucose Reference Range: 100-350 mg/dl   Pre-Treatment Post-Treatment   Time vitals are taken: 1315 Time vitals are taken: 1535   Blood Pressure: 122/68 Blood Pressure: 120/66   Pulse: 88 Pulse: 78     Resp: 20   Temp: 97 2 °F (36 2 °C) Temp: 96 9 °F (36 1 °C)             Allergies   Allergen Reactions    Diacetylmorphine      paranoid    Morphine Other (See Comments)     "confusion & paranoid behavior"    Pineapple Tongue Swelling     Tongue bleeding     Patient Active Problem List    Diagnosis Date Noted    Major depression, recurrent ([facility] ) 12/04/2018    Anal lesion 03/26/2018    Tonsil cancer ([facility] ) 03/15/2018    Hypothyroidism, adult 01/12/2018    Vitamin D deficiency 01/12/2018    Mild cognitive impairment 12/08/2017    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

## 2021-02-17 ENCOUNTER — OFFICE VISIT (OUTPATIENT)
Dept: WOUND CARE | Facility: HOSPITAL | Age: 71
End: 2021-02-17
Payer: COMMERCIAL

## 2021-02-17 ENCOUNTER — TELEPHONE (OUTPATIENT)
Dept: FAMILY MEDICINE CLINIC | Facility: CLINIC | Age: 71
End: 2021-02-17

## 2021-02-17 DIAGNOSIS — M27.2 OSTEORADIONECROSIS OF MANDIBLE: ICD-10-CM
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
## 2021-02-17 PROCEDURE — 99183 HYPERBARIC OXYGEN THERAPY: CPT | Performed by: FAMILY MEDICINE

## 2021-02-17 PROCEDURE — G0277 HBOT, FULL BODY CHAMBER, 30M: HCPCS | Performed by: FAMILY MEDICINE

## 2021-02-17 NOTE — PROGRESS NOTES
HBO Treatment Course Details       Treatment Notes: Pt  Tolerated HBOT today without any complaints  Treatment Course Number: 3  Total Treatments Ordered:  30     Diagnosis:   1  Osteoradionecrosis of mandible  Hyperbaric oxygen [facility]       HBO Treatment Details:  Treatment Length:90 Minutes(Minutes)  Chamber #: Hard sided Monoplace Chamber    Pre-Treatment details:  Pre-treatment protocol Treatment Protocol: 2 5 DURAN X 90 minutes w/ 100% oxygen, two 5 minute air breaks  Left ear clear?: yes  Right ear clear?: yes  Left ear intact?: yes  Right ear intact?: yes                    Left ear TEED scale: Grade 0  Right ear TEED scale: Grade 0   Pretreatment heart and lung assessment: Pretreatment heart and lung auscltation unremarkable  Patient cleared for HBOT     Treatment details:  DURAN Rate: 2 5  Started Compression: 1105  Reached Compression: 1120  Total Compression Time: 15 (Minutes)  Total Holding Time: 100 (Minutes)  Started Decompression: 1300  Reached Surface: 1310  Total Decompression Time: 10 (Minutes)  Total Airbreaks: 10 (Minutes)  Total Time of Treatment: 115 (Minutes)  Symptoms Noted During Treatment: None (Minutes)    Post treatment details:  Left ear clear?: yes  Right ear clear?: yes  Left ears intact?: yes  Right ears intact?: yes                    Left ear TEED scale: Grade 0  Right ear TEED scale: Grade 0  Post treatment heart and lung assessment: Post treatment heart and lung auscltation unremarkable  Patient cleared for discharge  Tolerated treatment well         Vital Signs:  HBO Glucose Reference Range: 100-350 mg/dl   Pre-Treatment Post-Treatment   Time vitals are taken: 1100 Time vitals are taken: 1315   Blood Pressure: 118/62 Blood Pressure: 130/68   Pulse: 80 Pulse: 78   Resp: 16 Resp: 18   Temp: 96 8 °F (36 °C) Temp: 96 6 °F (35 9 °C)             Allergies   Allergen Reactions    Diacetylmorphine      paranoid    Morphine Other (See Comments)     "confusion & paranoid behavior"    Pineapple Tongue Swelling     Tongue bleeding     Patient Active Problem List    Diagnosis Date Noted    Major depression, recurrent ([facility] ) 12/04/2018    Anal lesion 03/26/2018    Tonsil cancer ([facility] ) 03/15/2018    Hypothyroidism, adult 01/12/2018    Vitamin D deficiency 01/12/2018    Mild cognitive impairment 12/08/2017    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

## 2021-02-17 NOTE — TELEPHONE ENCOUNTER
Pt called to cancel her up coming appt but would like to let you know that she will call back to re schedule once she has completed her Bariatric Oxygen Therapy which may take up to 5 weeks

## 2021-02-19 ENCOUNTER — OFFICE VISIT (OUTPATIENT)
Dept: WOUND CARE | Facility: HOSPITAL | Age: 71
End: 2021-02-19
Payer: COMMERCIAL
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
## 2021-02-19 DIAGNOSIS — M27.2 OSTEORADIONECROSIS OF MANDIBLE: ICD-10-CM

## 2021-02-19 PROCEDURE — G0277 HBOT, FULL BODY CHAMBER, 30M: HCPCS | Performed by: FAMILY MEDICINE

## 2021-02-19 PROCEDURE — 99183 HYPERBARIC OXYGEN THERAPY: CPT | Performed by: FAMILY MEDICINE

## 2021-02-19 NOTE — PROGRESS NOTES
HBO Treatment Course Details       Treatment Notes: Pt  Tolerated HBOT today without any complaints  Treatment Course Number: 4  Total Treatments Ordered:  30     Diagnosis:   1  Osteoradionecrosis of mandible  [facility]       [facility] Treatment Details:  Treatment Length:90 Minutes(Minutes)  Chamber #: Hard sided Monoplace Chamber    Pre-Treatment details:  Pre-treatment protocol Treatment Protocol: 2 5 DURAN X 90 minutes w/ 100% oxygen, two 5 minute air breaks  Left ear clear?: yes  Right ear clear?: yes  Left ear intact?: yes  Right ear intact?: yes                    Left ear TEED scale: Grade 0  Right ear TEED scale: Grade 0   Pretreatment heart and lung assessment: Pretreatment heart and lung auscltation unremarkable  Patient cleared for HBOT     Treatment details:  DURAN Rate: 2 5  Started Compression: 1107  Reached Compression: 1122  Total Compression Time: 15 (Minutes)  Total Holding Time: 100 (Minutes)  Started Decompression: 1302  Reached Surface: 1312  Total Decompression Time: 10 (Minutes)  Total Airbreaks: 10 (Minutes)  Total Time of Treatment: 115 (Minutes)  Symptoms Noted During Treatment: None (Minutes)    Post treatment details:  Left ear clear?: yes  Right ear clear?: yes  Left ears intact?: yes  Right ears intact?: yes                    Left ear TEED scale: Grade 0  Right ear TEED scale: Grade 0  Post treatment heart and lung assessment: Post treatment heart and lung auscltation unremarkable  Patient cleared for discharge  Tolerated treatment well         Vital Signs:  HBO Glucose Reference Range: 100-350 mg/dl   Pre-Treatment Post-Treatment   Time vitals are taken: 1100 Time vitals are taken: 1315   Blood Pressure: 122/66 Blood Pressure: 120/64   Pulse: 80 Pulse: 76   Resp: 16 Resp: 20   Temp: 96 °F (35 6 °C) Temp: 96 °F (35 6 °C)             Allergies   Allergen Reactions    Diacetylmorphine      paranoid    Morphine Other (See Comments)     "confusion & paranoid behavior"    Pineapple Tongue Swelling     Tongue bleeding     Patient Active Problem List    Diagnosis Date Noted    Major depression, recurrent ([facility] ) 12/04/2018    Anal lesion 03/26/2018    Tonsil cancer ([facility] ) 03/15/2018    Hypothyroidism, adult 01/12/2018    Vitamin D deficiency 01/12/2018    Mild cognitive impairment 12/08/2017    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

## 2021-02-22 ENCOUNTER — OFFICE VISIT (OUTPATIENT)
Dept: WOUND CARE | Facility: HOSPITAL | Age: 71
End: 2021-02-22
Payer: COMMERCIAL

## 2021-02-22 DIAGNOSIS — M27.2 OSTEORADIONECROSIS OF MANDIBLE: ICD-10-CM
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
## 2021-02-22 PROCEDURE — G0277 HBOT, FULL BODY CHAMBER, 30M: HCPCS

## 2021-02-22 PROCEDURE — 99183 HYPERBARIC OXYGEN THERAPY: CPT | Performed by: PHYSICIAN ASSISTANT

## 2021-02-22 PROCEDURE — NC001 PR NO CHARGE: Performed by: PHYSICIAN ASSISTANT

## 2021-02-23 ENCOUNTER — OFFICE VISIT (OUTPATIENT)
Dept: WOUND CARE | Facility: HOSPITAL | Age: 71
End: 2021-02-23
Payer: COMMERCIAL

## 2021-02-23 DIAGNOSIS — M27.2 OSTEORADIONECROSIS OF MANDIBLE: ICD-10-CM
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
## 2021-02-23 PROCEDURE — G0277 HBOT, FULL BODY CHAMBER, 30M: HCPCS | Performed by: PHYSICIAN ASSISTANT

## 2021-02-23 PROCEDURE — 99183 HYPERBARIC OXYGEN THERAPY: CPT | Performed by: PHYSICIAN ASSISTANT

## 2021-02-23 NOTE — PROGRESS NOTES
HBO Treatment Course Details       Treatment Notes: Pt  Tolerated HBOT today without any problems  Treatment Course Number: 6  Total Treatments Ordered:  30     Diagnosis:   1  Osteoradionecrosis of mandible  [facility]       HBO Treatment Details:  Treatment Length:90 Minutes(Minutes)  Chamber #: Hard sided Monoplace Chamber    Pre-Treatment details:  Pre-treatment protocol Treatment Protocol: 2 5 DURAN X 90 minutes w/ 100% oxygen, two 5 minute air breaks  Left ear clear?: yes  Right ear clear?: yes  Left ear intact?: yes  Right ear intact?: yes        PE Tubes present, Left ear?: no  PE Tubes present, Right ear?: no  Left ear irrigated?: no  Right ear irrigated?: no  Left ear TEED scale: Grade 0  Right ear TEED scale: Grade 0   Pretreatment heart and lung assessment: Pretreatment heart and lung auscltation unremarkable  Patient cleared for HBOT     Treatment details:  DURAN Rate: 2 5  Started Compression: 1105  Reached Compression: 1115  Total Compression Time: 10 (Minutes)  Total Holding Time: 100 (Minutes)  Started Decompression: 1255  Reached Surface: 5786  Total Decompression Time: 10 (Minutes)  Total Airbreaks: 10 (Minutes)  Total Time of Treatment: 110 (Minutes)  Symptoms Noted During Treatment: None (Minutes)    Post treatment details:  Left ear clear?: yes  Right ear clear?: yes  Left ears intact?: yes  Right ears intact?: yes        PE Tubes present, Left ear?: no  PE Tubes present, Right ear?: no        Left ear TEED scale: Grade 0  Right ear TEED scale: Grade 0  Post treatment heart and lung assessment: Post treatment heart and lung auscltation unremarkable  Patient cleared for discharge  Tolerated treatment well         Vital Signs:  HBO Glucose Reference Range: 100-350 mg/dl   Pre-Treatment Post-Treatment   Time vitals are taken: 1100 Time vitals are taken: 1310   Blood Pressure: 128/68 Blood Pressure: 100/64   Pulse: 86 Pulse: 84   Resp: 20 Resp: 18   Temp: 96 9 °F (36 1 °C) Temp: 96 8 °F (36 °C)             Allergies   Allergen Reactions    Diacetylmorphine      paranoid    Morphine Other (See Comments)     "confusion & paranoid behavior"    Pineapple Tongue Swelling     Tongue bleeding     Patient Active Problem List    Diagnosis Date Noted    Major depression, recurrent ([facility] ) 12/04/2018    Anal lesion 03/26/2018    Tonsil cancer ([place] [id number] ) 03/15/2018    Hypothyroidism, adult 01/12/2018    Vitamin D deficiency 01/12/2018    Mild cognitive impairment 12/08/2017    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

## 2021-02-24 ENCOUNTER — OFFICE VISIT (OUTPATIENT)
Dept: WOUND CARE | Facility: HOSPITAL | Age: 71
End: 2021-02-24
Payer: COMMERCIAL
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
## 2021-02-24 DIAGNOSIS — M27.2 OSTEORADIONECROSIS OF MANDIBLE: ICD-10-CM

## 2021-02-24 PROCEDURE — G0277 HBOT, FULL BODY CHAMBER, 30M: HCPCS | Performed by: FAMILY MEDICINE

## 2021-02-24 PROCEDURE — 99183 HYPERBARIC OXYGEN THERAPY: CPT | Performed by: FAMILY MEDICINE

## 2021-02-24 NOTE — PROGRESS NOTES
Patient is tolerating HBO well  She reports she has an occasional headache in the AM but that it quickly resolves  She denies problems clearing ears, denies sinus problems and denies visual changes  She denies jaw discomfort but reports she can "feel" the abscess from time to time  She looks forward to being able to have her hip "fixed" in the near future

## 2021-02-24 NOTE — PROGRESS NOTES
HBO Treatment Course Details       Treatment Notes: Pt  Tolerated HBOT today without any complaints  Treatment Course Number: 7  Total Treatments Ordered:  30     Diagnosis:   1  Osteoradionecrosis of mandible  Hyperbaric oxygen [facility]       HBO Treatment Details:  Treatment Length: (Minutes)  Chamber #: Hard sided Monoplace Chamber    Pre-Treatment details:  Pre-treatment protocol Treatment Protocol: 2 5 DURAN X 90 minutes w/ 100% oxygen, two 5 minute air breaks  Left ear clear?: yes  Right ear clear?: yes  Left ear intact?: yes  Right ear intact?: yes                    Left ear TEED scale: Grade 0  Right ear TEED scale: Grade 0   Pretreatment heart and lung assessment: Pretreatment heart and lung auscltation unremarkable  Patient cleared for HBOT     Treatment details:  DURAN Rate:    Started Compression:    Reached Compression:    Total Compression Time:   (Minutes)  Total Holding Time:   (Minutes)  Started Decompression:    Reached Surface: Total Decompression Time:   (Minutes)  Total Airbreaks:   (Minutes)  Total Time of Treatment:   (Minutes)  Symptoms Noted During Treatment: None (Minutes)    Post treatment details:  Left ear clear?: yes  Right ear clear?: yes  Left ears intact?: yes  Right ears intact?: yes                    Left ear TEED scale: Grade 0  Right ear TEED scale: Grade 0  Post treatment heart and lung assessment: Post treatment heart and lung auscltation unremarkable  Patient cleared for discharge  Tolerated treatment well         Vital Signs:  HBO Glucose Reference Range: 100-350 mg/dl   Pre-Treatment Post-Treatment   Time vitals are taken: 1050 Time vitals are taken: 1300   Blood Pressure: 116/66 Blood Pressure: 128/64   Pulse: 88 Pulse: 82   Resp: 20 Resp: 18   Temp: 97 °F (36 1 °C) Temp: 96 3 °F (35 7 °C)             Allergies   Allergen Reactions    Diacetylmorphine      paranoid    Morphine Other (See Comments)     "confusion & paranoid behavior"    Pineapple Tongue Swelling Tongue bleeding     Patient Active Problem List    Diagnosis Date Noted    Major depression, recurrent ([facility] ) 12/04/2018    Anal lesion 03/26/2018    Tonsil cancer ([facility] ) 03/15/2018    Hypothyroidism, adult 01/12/2018    Vitamin D deficiency 01/12/2018    Mild cognitive impairment 12/08/2017    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

## 2021-02-25 ENCOUNTER — OFFICE VISIT (OUTPATIENT)
Dept: WOUND CARE | Facility: HOSPITAL | Age: 71
End: 2021-02-25
Payer: COMMERCIAL
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
## 2021-02-25 DIAGNOSIS — M27.2 OSTEORADIONECROSIS OF MANDIBLE: ICD-10-CM

## 2021-02-25 PROCEDURE — G0277 HBOT, FULL BODY CHAMBER, 30M: HCPCS | Performed by: FAMILY MEDICINE

## 2021-02-25 PROCEDURE — 99183 HYPERBARIC OXYGEN THERAPY: CPT | Performed by: FAMILY MEDICINE

## 2021-02-25 NOTE — PROGRESS NOTES
HBO Treatment Course Details       Treatment Notes: Pt  Tolerated HBOT today without any complications or complaints  Treatment Course Number: 8  Total Treatments Ordered:  30     Diagnosis:   1  Osteoradionecrosis of mandible  [facility]       H[facility] Treatment Details:  Treatment Length:90 Minutes(Minutes)  Chamber #: Hard sided Monoplace Chamber    Pre-Treatment details:  Pre-treatment protocol Treatment Protocol: 2 5 DURAN X 90 minutes w/ 100% oxygen, two 5 minute air breaks  Left ear clear?: yes  Right ear clear?: yes  Left ear intact?: yes  Right ear intact?: yes        PE Tubes present, Left ear?: no  PE Tubes present, Right ear?: no  Left ear irrigated?: no  Right ear irrigated?: no  Left ear TEED scale: Grade 0  Right ear TEED scale: Grade 0   Pretreatment heart and lung assessment: Pretreatment heart and lung auscltation unremarkable  Patient cleared for HBOT     Treatment details:  DURAN Rate: 2 5  Started Compression: 1100  Reached Compression: 1110  Total Compression Time: 10 (Minutes)  Total Holding Time: 100 (Minutes)  Started Decompression: 1250  Reached Surface: 1300  Total Decompression Time: 10 (Minutes)  Total Airbreaks: 10 (Minutes)  Total Time of Treatment: 110 (Minutes)  Symptoms Noted During Treatment: None (Minutes)    Post treatment details:  Left ear clear?: yes  Right ear clear?: yes  Left ears intact?: yes  Right ears intact?: yes        PE Tubes present, Left ear?: no  PE Tubes present, Right ear?: no        Left ear TEED scale: Grade 0  Right ear TEED scale: Grade 0  Post treatment heart and lung assessment: Post treatment heart and lung auscltation unremarkable  Patient cleared for discharge  Tolerated treatment well         Vital Signs:  HBO Glucose Reference Range: 100-350 mg/dl   Pre-Treatment Post-Treatment   Time vitals are taken: 1100 Time vitals are taken: 1305   Blood Pressure: 120/62 Blood Pressure: 126/60   Pulse: 84 Pulse: 76     Resp: 18   Temp: 98 1 °F (36 7 °C) Temp: 96 5 °F (35 8 °C)             Allergies   Allergen Reactions    Diacetylmorphine      paranoid    Morphine Other (See Comments)     "confusion & paranoid behavior"    Pineapple Tongue Swelling     Tongue bleeding     Patient Active Problem List    Diagnosis Date Noted    Major depression, recurrent ([facility] ) 12/04/2018    Anal lesion 03/26/2018    Tonsil cancer ([facility] ) 03/15/2018    Hypothyroidism, adult 01/12/2018    Vitamin D deficiency 01/12/2018    Mild cognitive impairment 12/08/2017    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

## 2021-02-26 ENCOUNTER — OFFICE VISIT (OUTPATIENT)
Dept: WOUND CARE | Facility: HOSPITAL | Age: 71
End: 2021-02-26
Payer: COMMERCIAL

## 2021-02-26 DIAGNOSIS — M27.2 OSTEORADIONECROSIS OF MANDIBLE: ICD-10-CM
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
## 2021-02-26 PROCEDURE — G0277 HBOT, FULL BODY CHAMBER, 30M: HCPCS | Performed by: FAMILY MEDICINE

## 2021-02-26 PROCEDURE — 99183 HYPERBARIC OXYGEN THERAPY: CPT | Performed by: FAMILY MEDICINE

## 2021-02-26 NOTE — PROGRESS NOTES
HBO Treatment Course Details       Treatment Notes: Pt  Tolerated HBOT today without any complaints  Treatment Course Number: 9  Total Treatments Ordered:  30     Diagnosis:   1  Osteoradionecrosis of mandible  Hyperbaric oxygen [facility]       HBO Treatment Details:  Treatment Length:90 Minutes(Minutes)  Chamber #: Hard sided Monoplace Chamber    Pre-Treatment details:  Pre-treatment protocol Treatment Protocol: 2 5 DURAN X 90 minutes w/ 100% oxygen, two 5 minute air breaks  Left ear clear?: yes  Right ear clear?: yes  Left ear intact?: yes  Right ear intact?: yes                    Left ear TEED scale: Grade 0  Right ear TEED scale: Grade 0   Pretreatment heart and lung assessment: Pretreatment heart and lung auscltation unremarkable  Patient cleared for HBOT     Treatment details:  DURAN Rate: 2 5  Started Compression: 1100  Reached Compression: 1110  Total Compression Time: 10 (Minutes)  Total Holding Time: 100 (Minutes)  Started Decompression: 1250  Reached Surface: 1300  Total Decompression Time: 10 (Minutes)  Total Airbreaks: 10 (Minutes)  Total Time of Treatment: 110 (Minutes)  Symptoms Noted During Treatment: None (Minutes)    Post treatment details:  Left ear clear?: yes  Right ear clear?: yes  Left ears intact?: yes  Right ears intact?: yes        PE Tubes present, Left ear?: no  PE Tubes present, Right ear?: no        Left ear TEED scale: Grade 0  Right ear TEED scale: Grade 0  Post treatment heart and lung assessment: Post treatment heart and lung auscltation unremarkable  Patient cleared for discharge  Tolerated treatment well         Vital Signs:  HBO Glucose Reference Range: 100-350 mg/dl   Pre-Treatment Post-Treatment   Time vitals are taken: 1055 Time vitals are taken: 1305   Blood Pressure: 140/80 Blood Pressure: 122/68   Pulse: 88 Pulse: 76   Resp: 20 Resp: 16   Temp: 96 6 °F (35 9 °C) Temp: 96 °F (35 6 °C)             Allergies   Allergen Reactions    Diacetylmorphine      paranoid    Morphine Other (See Comments)     "confusion & paranoid behavior"    Pineapple Tongue Swelling     Tongue bleeding     Patient Active Problem List    Diagnosis Date Noted    Major depression, recurrent ([facility] ) 12/04/2018    Anal lesion 03/26/2018    Tonsil cancer ([facility] ) 03/15/2018    Hypothyroidism, adult 01/12/2018    Vitamin D deficiency 01/12/2018    Mild cognitive impairment 12/08/2017    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

## 2021-03-01 ENCOUNTER — OFFICE VISIT (OUTPATIENT)
Dept: WOUND CARE | Facility: HOSPITAL | Age: 71
End: 2021-03-01
Payer: COMMERCIAL
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
## 2021-03-01 DIAGNOSIS — M27.2 OSTEORADIONECROSIS OF MANDIBLE: ICD-10-CM

## 2021-03-01 PROCEDURE — G0277 HBOT, FULL BODY CHAMBER, 30M: HCPCS

## 2021-03-01 PROCEDURE — NC001 PR NO CHARGE: Performed by: PHYSICIAN ASSISTANT

## 2021-03-01 PROCEDURE — 99183 HYPERBARIC OXYGEN THERAPY: CPT | Performed by: PHYSICIAN ASSISTANT

## 2021-03-01 NOTE — PROGRESS NOTES
HBO Treatment Course Details       Treatment Notes: Pt  Tolerated HBOT today without any complaints  Treatment Course Number: 10  Total Treatments Ordered:  30     Diagnosis:   1  [facility]ecrosis of mandible  [facility]       [facility] Details:  Treatment Length:90 Minutes(Minutes)  Chamber #: Hard sided Monoplace Chamber    Pre-Treatment details:  Pre-treatment protocol Treatment Protocol: 2 5 DURAN X 90 minutes w/ 100% oxygen, two 5 minute air breaks  Left ear clear?: yes  Right ear clear?: yes  Left ear intact?: yes  Right ear intact?: yes        PE Tubes present, Left ear?: no  PE Tubes present, Right ear?: no  Left ear irrigated?: no  Right ear irrigated?: no  Left ear TEED scale: Grade 0  Right ear TEED scale: Grade 0   Pretreatment heart and lung assessment: Pretreatment heart and lung auscltation unremarkable  Patient cleared for HBOT     Treatment details:  DURAN Rate: 2 5  Started Compression: 1053  Reached Compression: 1103  Total Compression Time: 10 (Minutes)  Total Holding Time: 100 (Minutes)  Started Decompression: 1243  Reached Surface: 4654  Total Decompression Time: 10 (Minutes)  Total Airbreaks: 10 (Minutes)  Total Time of Treatment: 110 (Minutes)  Symptoms Noted During Treatment: None (Minutes)    Post treatment details:  Left ear clear?: yes  Right ear clear?: yes  Left ears intact?: yes  Right ears intact?: yes        PE Tubes present, Left ear?: no  PE Tubes present, Right ear?: no        Left ear TEED scale: Grade 0  Right ear TEED scale: Grade 0  Post treatment heart and lung assessment: Post treatment heart and lung auscltation unremarkable  Patient cleared for discharge  Tolerated treatment well         Vital Signs:  HBO Glucose Reference Range: 100-350 mg/dl   Pre-Treatment Post-Treatment   Time vitals are taken: 1050 Time vitals are taken: 1255   Blood Pressure: 100/58 Blood Pressure: 120/62   Pulse: 72 Pulse: 82   Resp: 16 Resp: 16   Temp: 96 8 °F (36 °C) Temp: 95 7 °F (35 4 °C)             Allergies   Allergen Reactions    Diacetylmorphine      paranoid    Morphine Other (See Comments)     "confusion & paranoid behavior"    Pineapple Tongue Swelling     Tongue bleeding     Patient Active Problem List    Diagnosis Date Noted    Major depression, recurrent ([facility] ) 12/04/2018    Anal lesion 03/26/2018    Tonsil cancer ([facility] ) 03/15/2018    Hypothyroidism, adult 01/12/2018    Vitamin D deficiency 01/12/2018    Mild cognitive impairment 12/08/2017    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

## 2021-03-02 ENCOUNTER — OFFICE VISIT (OUTPATIENT)
Dept: WOUND CARE | Facility: HOSPITAL | Age: 71
End: 2021-03-02
Payer: COMMERCIAL

## 2021-03-02 DIAGNOSIS — M27.2 OSTEORADIONECROSIS OF MANDIBLE: ICD-10-CM
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
## 2021-03-02 PROCEDURE — G0277 HBOT, FULL BODY CHAMBER, 30M: HCPCS | Performed by: PHYSICIAN ASSISTANT

## 2021-03-02 PROCEDURE — 99183 HYPERBARIC OXYGEN THERAPY: CPT | Performed by: PHYSICIAN ASSISTANT

## 2021-03-02 NOTE — PROGRESS NOTES
HBO Treatment Course Details       Treatment Notes: Pt  Tolerated HBOT today without any complications  Treatment Course Number: 11  Total Treatments Ordered:  30     Diagnosis:   1  Osteoradionecrosis of mandible  [facility]       HBO Treatment Details:Treatment Length:90 Minutes(Minutes)  Chamber #: Hard sided Monoplace Chamber    Pre-Treatment details:  Pre-treatment protocol Treatment Protocol: 2 5 DURAN X 90 minutes w/ 100% oxygen, two 5 minute air breaks  Left ear clear?: yes  Right ear clear?: yes  Left ear intact?: yes  Right ear intact?: yes        PE Tubes present, Left ear?: no  PE Tubes present, Right ear?: no  Left ear irrigated?: no  Right ear irrigated?: no  Left ear TEED scale: Grade 0  Right ear TEED scale: Grade 0   Pretreatment heart and lung assessment: Pretreatment heart and lung auscltation unremarkable  Patient cleared for HBOT     Treatment details:  DURAN Rate: 2 5  Started Compression: 1102  Reached Compression: 1112  Total Compression Time: 10 (Minutes)  Total Holding Time: 100 (Minutes)  Started Decompression: 1252  Reached Surface: 1302  Total Decompression Time: 10 (Minutes)  Total Airbreaks: 10 (Minutes)  Total Time of Treatment: 110 (Minutes)  Symptoms Noted During Treatment: None (Minutes)    Post treatment details:  Left ear clear?: yes  Right ear clear?: yes  Left ears intact?: yes  Right ears intact?: yes        PE Tubes present, Left ear?: no  PE Tubes present, Right ear?: no        Left ear TEED scale: Grade 0  Right ear TEED scale: Grade 0  Post treatment heart and lung assessment: Post treatment heart and lung auscltation unremarkable  Patient cleared for discharge  Tolerated treatment well         Vital Signs:  HBO Glucose Reference Range: 100-350 mg/dl   Pre-Treatment Post-Treatment   Time vitals are taken: 1100 Time vitals are taken: 1305   Blood Pressure: 102/62 Blood Pressure: 116/60   Pulse: 78 Pulse: 80   Resp: 16 Resp: 18   Temp: 96 9 °F (36 1 °C) Temp: 95 9 °F (35 5 °C)             Allergies   Allergen Reactions    Diacetylmorphine      paranoid    Morphine Other (See Comments)     "confusion & paranoid behavior"    Pineapple Tongue Swelling     Tongue bleeding     Patient Active Problem List    Diagnosis Date Noted    Major depression, recurrent ([facility] ) 12/04/2018    Anal lesion 03/26/2018    Tonsil cancer ([facility] ) 03/15/2018    Hypothyroidism, adult 01/12/2018    Vitamin D deficiency 01/12/2018    Mild cognitive impairment 12/08/2017    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

## 2021-03-03 ENCOUNTER — OFFICE VISIT (OUTPATIENT)
Dept: WOUND CARE | Facility: HOSPITAL | Age: 71
End: 2021-03-03
Payer: COMMERCIAL
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
## 2021-03-03 DIAGNOSIS — M27.2 OSTEORADIONECROSIS OF MANDIBLE: ICD-10-CM

## 2021-03-03 PROCEDURE — 99183 HYPERBARIC OXYGEN THERAPY: CPT | Performed by: FAMILY MEDICINE

## 2021-03-03 PROCEDURE — G0277 HBOT, FULL BODY CHAMBER, 30M: HCPCS | Performed by: FAMILY MEDICINE

## 2021-03-03 NOTE — PROGRESS NOTES
HBO Treatment Course Details       Treatment Notes: Pt  Tolerated HBOT today without any complaints  Treatment Course Number: 12  Total Treatments Ordered:  30     Diagnosis:   1  Osteoradionecrosis of mandible  [facility]       [facility] Treatment Details:  Treatment Length:90 Minutes(Minutes)  Chamber #: Hard sided Monoplace Chamber    Pre-Treatment details:  Pre-treatment protocol Treatment Protocol: 2 5 DURAN X 90 minutes w/ 100% oxygen, two 5 minute air breaks  Left ear clear?: yes  Right ear clear?: yes  Left ear intact?: yes  Right ear intact?: yes                    Left ear TEED scale: Grade 0  Right ear TEED scale: Grade 0   Pretreatment heart and lung assessment: Pretreatment heart and lung auscltation unremarkable  Patient cleared for HBOT     Treatment details:  DURAN Rate: 2 5  Started Compression: 1047  Reached Compression: 1057  Total Compression Time: 10 (Minutes)  Total Holding Time: 100 (Minutes)  Started Decompression: 1237  Reached Surface: 1247  Total Decompression Time: 10 (Minutes)  Total Airbreaks: 10 (Minutes)  Total Time of Treatment: 110 (Minutes)  Symptoms Noted During Treatment: None (Minutes)    Post treatment details:  Left ear clear?: yes  Right ear clear?: yes  Left ears intact?: yes  Right ears intact?: yes                    Left ear TEED scale: Grade 0  Right ear TEED scale: Grade 0  Post treatment heart and lung assessment: Post treatment heart and lung auscltation unremarkable  Patient cleared for discharge  Tolerated treatment well         Vital Signs:  HBO Glucose Reference Range: 100-350 mg/dl   Pre-Treatment Post-Treatment   Time vitals are taken: 1040 Time vitals are taken: 1250   Blood Pressure: 100/58 Blood Pressure: 106/62   Pulse: 84 Pulse: 76   Resp: 16 Resp: 18   Temp: 96 7 °F (35 9 °C) Temp: 97 1 °F (36 2 °C)             Allergies   Allergen Reactions    Diacetylmorphine      paranoid    Morphine Other (See Comments)     "confusion & paranoid behavior"  Pineapple Tongue Swelling     Tongue bleeding     Patient Active Problem List    Diagnosis Date Noted    Major depression, recurrent ([facility] ) 12/04/2018    Anal lesion 03/26/2018    Tonsil cancer ([facility] ) 03/15/2018    Hypothyroidism, adult 01/12/2018    Vitamin D deficiency 01/12/2018    Mild cognitive impairment 12/08/2017    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

## 2021-03-04 ENCOUNTER — OFFICE VISIT (OUTPATIENT)
Dept: WOUND CARE | Facility: HOSPITAL | Age: 71
End: 2021-03-04
Payer: COMMERCIAL
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
## 2021-03-04 DIAGNOSIS — M27.2 OSTEORADIONECROSIS OF MANDIBLE: ICD-10-CM

## 2021-03-04 PROCEDURE — 99183 HYPERBARIC OXYGEN THERAPY: CPT | Performed by: FAMILY MEDICINE

## 2021-03-04 PROCEDURE — G0277 HBOT, FULL BODY CHAMBER, 30M: HCPCS | Performed by: FAMILY MEDICINE

## 2021-03-04 NOTE — PROGRESS NOTES
HBO Treatment Course Details       Treatment Notes: Pt  Tolerated HBOT today without any complaints  Treatment Course Number: 13  Total Treatments Ordered:  30     Diagnosis:   1  Osteoradionecrosis of mandible  [facility]       HBO Treatment Details:  Treatment Length:90 Minutes(Minutes)  Chamber #: Hard sided Monoplace Chamber    Pre-Treatment details:  Pre-treatment protocol Treatment Protocol: 2 5 DURAN X 90 minutes w/ 100% oxygen, two 5 minute air breaks  Left ear clear?: yes  Right ear clear?: yes  Left ear intact?: yes  Right ear intact?: yes                    Left ear TEED scale: Grade 0  Right ear TEED scale: Grade 0   Pretreatment heart and lung assessment: Pretreatment heart and lung auscltation unremarkable  Patient cleared for HBOT     Treatment details:  DURAN Rate: 2 5  Started Compression: 1055  Reached Compression: 1105  Total Compression Time: 10 (Minutes)  Total Holding Time: 100 (Minutes)  Started Decompression: 1245  Reached Surface: 1255  Total Decompression Time: 10 (Minutes)  Total Airbreaks: 10 (Minutes)  Total Time of Treatment: 110 (Minutes)  Symptoms Noted During Treatment: None (Minutes)    Post treatment details:  Left ear clear?: yes  Right ear clear?: yes  Left ears intact?: yes  Right ears intact?: yes                    Left ear TEED scale: Grade 0  Right ear TEED scale: Grade 0  Post treatment heart and lung assessment: Post treatment heart and lung auscltation unremarkable  Patient cleared for discharge  Tolerated treatment well         Vital Signs:  HBO Glucose Reference Range: 100-350 mg/dl   Pre-Treatment Post-Treatment   Time vitals are taken: 1045 Time vitals are taken: 1300   Blood Pressure: 118/62 Blood Pressure: 110/66   Pulse: 76 Pulse: 74   Resp: 18 Resp: 20   Temp: 96 1 °F (35 6 °C) Temp: 96 5 °F (35 8 °C)             Allergies   Allergen Reactions    Diacetylmorphine      paranoid    Morphine Other (See Comments)     "confusion & paranoid behavior"  Pineapple Tongue Swelling     Tongue bleeding     Patient Active Problem List    Diagnosis Date Noted    [facility] depression, recurrent ([facility] ) 12/04/2018    Anal lesion 03/26/2018    Tonsil cancer ([facility] ) 03/15/2018    Hypothyroidism, adult 01/12/2018    Vitamin D deficiency 01/12/2018    Mild cognitive impairment 12/08/2017    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

## 2021-03-05 ENCOUNTER — OFFICE VISIT (OUTPATIENT)
Dept: WOUND CARE | Facility: HOSPITAL | Age: 71
End: 2021-03-05
Payer: COMMERCIAL
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
## 2021-03-05 DIAGNOSIS — M27.2 OSTEORADIONECROSIS OF MANDIBLE: ICD-10-CM

## 2021-03-05 PROCEDURE — G0277 HBOT, FULL BODY CHAMBER, 30M: HCPCS | Performed by: FAMILY MEDICINE

## 2021-03-05 PROCEDURE — 99183 HYPERBARIC OXYGEN THERAPY: CPT | Performed by: FAMILY MEDICINE

## 2021-03-05 NOTE — PROGRESS NOTES
HBO Treatment Course Details       Treatment Notes: Pt  Tolerated HBOT today without any complaints  Treatment Course Number: 14  Total Treatments Ordered:  30     Diagnosis:   1  Osteoradionecrosis of mandible  [facility]       [facility] Treatment Details:  Treatment Length:90 Minutes(Minutes)  Chamber #: Hard sided Monoplace Chamber    Pre-Treatment details:  Pre-treatment protocol Treatment Protocol: 2 5 DURAN X 90 minutes w/ 100% oxygen, two 5 minute air breaks  Left ear clear?: yes  Right ear clear?: yes  Left ear intact?: yes  Right ear intact?: yes                    Left ear TEED scale: Grade 0  Right ear TEED scale: Grade 0   Pretreatment heart and lung assessment: Pretreatment heart and lung auscltation unremarkable  Patient cleared for HBOT     Treatment details:  DURAN Rate: 2 5  Started Compression: 1042  Reached Compression: 1052  Total Compression Time: 10 (Minutes)  Total Holding Time: 100 (Minutes)  Started Decompression: 1232  Reached Surface: 3513  Total Decompression Time: 10 (Minutes)  Total Airbreaks: 10 (Minutes)  Total Time of Treatment: 110 (Minutes)  Symptoms Noted During Treatment: None (Minutes)    Post treatment details:  Left ear clear?: yes  Right ear clear?: yes  Left ears intact?: yes  Right ears intact?: yes                    Left ear TEED scale: Grade 0  Right ear TEED scale: Grade 0  Post treatment heart and lung assessment: Post treatment heart and lung auscltation unremarkable  Patient cleared for discharge  Tolerated treatment well         Vital Signs:  HBO Glucose Reference Range: 100-350 mg/dl   Pre-Treatment Post-Treatment   Time vitals are taken: 1035 Time vitals are taken: 1245   Blood Pressure: 117/64 Blood Pressure: 120/66   Pulse: 80 Pulse: 78   Resp: 14 Resp: 16   Temp: 96 4 °F (35 8 °C) Temp: 95 6 °F (35 3 °C)             Allergies   Allergen Reactions    Diacetylmorphine      paranoid    Morphine Other (See Comments)     "confusion & paranoid behavior"  Pineapple Tongue Swelling     Tongue bleeding     Patient Active Problem List    Diagnosis Date Noted    [facility], [facility] 12/04/2018    Anal lesion 03/26/2018    [facility] ) 03/15/2018    Hypothyroidism, adult 01/12/2018    Vitamin D deficiency 01/12/2018    Mild cognitive impairment 12/08/2017    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

## 2021-03-08 ENCOUNTER — OFFICE VISIT (OUTPATIENT)
Dept: WOUND CARE | Facility: HOSPITAL | Age: 71
End: 2021-03-08
Payer: COMMERCIAL
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
## 2021-03-08 DIAGNOSIS — M27.2 OSTEORADIONECROSIS OF MANDIBLE: ICD-10-CM

## 2021-03-08 PROCEDURE — 99183 HYPERBARIC OXYGEN THERAPY: CPT | Performed by: PHYSICIAN ASSISTANT

## 2021-03-08 PROCEDURE — G0277 HBOT, FULL BODY CHAMBER, 30M: HCPCS

## 2021-03-09 ENCOUNTER — OFFICE VISIT (OUTPATIENT)
Dept: WOUND CARE | Facility: HOSPITAL | Age: 71
End: 2021-03-09
Payer: COMMERCIAL

## 2021-03-09 DIAGNOSIS — M27.2 OSTEORADIONECROSIS OF MANDIBLE: ICD-10-CM
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
## 2021-03-09 PROCEDURE — G0277 HBOT, FULL BODY CHAMBER, 30M: HCPCS

## 2021-03-09 PROCEDURE — 99183 HYPERBARIC OXYGEN THERAPY: CPT | Performed by: PHYSICIAN ASSISTANT

## 2021-03-09 NOTE — PROGRESS NOTES
HBO Treatment Course Details       Treatment Notes: Pt  Tolerated HBOT today without any complaints  Treatment Course Number: 16  Total Treatments Ordered:  30     Diagnosis:   1  Osteoradionecrosis of mandible  [facility]       [facility] Treatment Details:  Treatment Length:90 Minutes(Minutes)  Chamber #: Hard sided Monoplace Chamber    Pre-Treatment details:  Pre-treatment protocol Treatment Protocol: 2 5 DURAN X 90 minutes w/ 100% oxygen, two 5 minute air breaks  Left ear clear?: yes  Right ear clear?: yes  Left ear intact?: yes  Right ear intact?: yes                    Left ear TEED scale: Grade 0  Right ear TEED scale: Grade 0   Pretreatment heart and lung assessment: Pretreatment heart and lung auscltation unremarkable  Patient cleared for HBOT     Treatment details:  DURAN Rate: 2 5  Started Compression: 0953  Reached Compression: 1003  Total Compression Time: 10 (Minutes)  Total Holding Time: 100 (Minutes)  Started Decompression: 1143  Reached Surface: 1153  Total Decompression Time: 10 (Minutes)  Total Airbreaks: 10 (Minutes)  Total Time of Treatment: 110 (Minutes)  Symptoms Noted During Treatment: None (Minutes)    Post treatment details:  Left ear clear?: yes  Right ear clear?: yes  Left ears intact?: yes  Right ears intact?: yes                    Left ear TEED scale: Grade 0  Right ear TEED scale: Grade 0  Post treatment heart and lung assessment: Post treatment heart and lung auscltation unremarkable  Patient cleared for discharge  Tolerated treatment well         Vital Signs:  HBO Glucose Reference Range: 100-350 mg/dl   Pre-Treatment Post-Treatment   Time vitals are taken: 0950 Time vitals are taken: 1155   Blood Pressure: 100/62 Blood Pressure: 120/64   Pulse: 82 Pulse: 76   Resp: 18 Resp: 16   Temp: 96 4 °F (35 8 °C) Temp: 97 °F (36 1 °C)             Allergies   Allergen Reactions    Diacetylmorphine      paranoid    Morphine Other (See Comments)     "confusion & paranoid behavior"    Pineapple Tongue Swelling     Tongue bleeding     Patient Active Problem List    Diagnosis Date Noted    Major depression, recurrent ([facility] ) 12/04/2018    Anal lesion 03/26/2018    Tonsil cancer ([facility] ) 03/15/2018    Hypothyroidism, adult 01/12/2018    Vitamin D deficiency 01/12/2018    Mild cognitive impairment 12/08/2017    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

## 2021-03-10 ENCOUNTER — OFFICE VISIT (OUTPATIENT)
Dept: WOUND CARE | Facility: HOSPITAL | Age: 71
End: 2021-03-10
Payer: COMMERCIAL

## 2021-03-10 DIAGNOSIS — M27.2 OSTEORADIONECROSIS OF MANDIBLE: ICD-10-CM
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
## 2021-03-10 PROCEDURE — G0277 HBOT, FULL BODY CHAMBER, 30M: HCPCS | Performed by: FAMILY MEDICINE

## 2021-03-10 PROCEDURE — 99183 HYPERBARIC OXYGEN THERAPY: CPT | Performed by: FAMILY MEDICINE

## 2021-03-10 NOTE — PROGRESS NOTES
HBO Treatment Course Details       Treatment Notes: Pt  Tolerated HBOT today without any adverse events  Denies ear pain or discomfort  Treatment Course Number: 17  Total Treatments Ordered:  30     Diagnosis:   1  Osteoradionecrosis of mandible  [facility]       HBO Treatment Details:  Treatment Length:90 Minutes(Minutes)  Chamber #: Hard sided Monoplace Chamber    Pre-Treatment details:  Pre-treatment protocol Treatment Protocol: 2 5 DURAN X 90 minutes w/ 100% oxygen, two 5 minute air breaks  Left ear clear?: yes  Right ear clear?: yes  Left ear intact?: yes  Right ear intact?: yes                    Left ear TEED scale: Grade 0  Right ear TEED scale: Grade 0   Pretreatment heart and lung assessment: Pretreatment heart and lung auscltation unremarkable  Patient cleared for HBOT     Treatment details:  DURAN Rate:    Started Compression: 0955  Reached Compression: 1005  Total Compression Time: 10 (Minutes)  Total Holding Time: 100 (Minutes)  Started Decompression: 1145  Reached Surface: 1155  Total Decompression Time: 10 (Minutes)  Total Airbreaks: 10 (Minutes)  Total Time of Treatment: 110 (Minutes)  Symptoms Noted During Treatment: None (Minutes)    Post treatment details:  Left ear clear?: yes  Right ear clear?: yes  Left ears intact?: yes  Right ears intact?: yes                    Left ear TEED scale: Grade 0  Right ear TEED scale: Grade 0  Post treatment heart and lung assessment: Post treatment heart and lung auscltation unremarkable  Patient cleared for discharge  Tolerated treatment well         Vital Signs:  HBO Glucose Reference Range: 100-350 mg/dl   Pre-Treatment Post-Treatment   Time vitals are taken: 0950 Time vitals are taken: 1200   Blood Pressure: 120/62 Blood Pressure: 118/60   Pulse: 80 Pulse: 74   Resp: 16 Resp: 20   Temp: 95 9 °F (35 5 °C) Temp: 96 1 °F (35 6 °C)             Allergies   Allergen Reactions    Diacetylmorphine      paranoid    Morphine Other (See Comments) "confusion & paranoid behavior"    Pineapple Tongue Swelling     Tongue bleeding     Patient Active Problem List    Diagnosis Date Noted    Major depression, recurrent ([facility] ) 12/04/2018    Anal lesion 03/26/2018    Tonsil cancer ([facility] ) 03/15/2018    Hypothyroidism, adult 01/12/2018    Vitamin D deficiency 01/12/2018    Mild cognitive impairment 12/08/2017    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

## 2021-03-10 NOTE — PROGRESS NOTES
Patient arrives for HBO treatment in good spirits  She reports she feels well and has no problems clearing ears, no problems with sinuses, no visual changes  She reports she continues to have a mild headache off and on but she feels it is related to anxiety  She has scheduled her oral surgery for 3/18 and feels one step closer to being able to have her hip replaced  She is tolerating her HBO treatments with no untoward effects

## 2021-03-11 ENCOUNTER — OFFICE VISIT (OUTPATIENT)
Dept: WOUND CARE | Facility: HOSPITAL | Age: 71
End: 2021-03-11
Payer: COMMERCIAL

## 2021-03-11 DIAGNOSIS — M27.2 OSTEORADIONECROSIS OF MANDIBLE: ICD-10-CM
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
## 2021-03-11 PROCEDURE — G0277 HBOT, FULL BODY CHAMBER, 30M: HCPCS | Performed by: FAMILY MEDICINE

## 2021-03-11 PROCEDURE — 99183 HYPERBARIC OXYGEN THERAPY: CPT | Performed by: FAMILY MEDICINE

## 2021-03-11 NOTE — PROGRESS NOTES
HBO Treatment Course Details       Treatment Notes: Pt  Tolerated HBOT today well without any complaints  Treatment Course Number: 18  Total Treatments Ordered:  30     Diagnosis:   1  Osteoradionecrosis of mandible  [facility]       H[facility] Treatment Details:  Treatment Length:90 Minutes(Minutes)  Chamber #: Hard sided Monoplace Chamber    Pre-Treatment details:  Pre-treatment protocol Treatment Protocol: 2 5 DURAN X 90 minutes w/ 100% oxygen, two 5 minute air breaks  Left ear clear?: yes  Right ear clear?: yes  Left ear intact?: yes  Right ear intact?: yes                    Left ear TEED scale: Grade 0  Right ear TEED scale: Grade 0   Pretreatment heart and lung assessment: Pretreatment heart and lung auscltation unremarkable  Patient cleared for HBOT     Treatment details:  DURAN Rate: 2 5  Started Compression: 0958  Reached Compression: 1008  Total Compression Time: 10 (Minutes)  Total Holding Time: 100 (Minutes)  Started Decompression: 1148  Reached Surface: 1158  Total Decompression Time: 10 (Minutes)  Total Airbreaks: 10 (Minutes)  Total Time of Treatment: 110 (Minutes)  Symptoms Noted During Treatment: None (Minutes)    Post treatment details:  Left ear clear?: yes  Right ear clear?: yes  Left ears intact?: yes  Right ears intact?: yes                    Left ear TEED scale: Grade 0  Right ear TEED scale: Grade 0  Post treatment heart and lung assessment: Post treatment heart and lung auscltation unremarkable  Patient cleared for discharge  Tolerated treatment well         Vital Signs:  HBO Glucose Reference Range: 100-350 mg/dl   Pre-Treatment Post-Treatment   Time vitals are taken: 0955 Time vitals are taken: 1200   Blood Pressure: 142/70 Blood Pressure: 136/64   Pulse: 84 Pulse: 76   Resp: 16 Resp: 16   Temp: 96 1 °F (35 6 °C) Temp: 97 °F (36 1 °C)             Allergies   Allergen Reactions    Diacetylmorphine      paranoid    Morphine Other (See Comments)     "confusion & paranoid behavior"  Pineapple Tongue Swelling     Tongue bleeding     Patient Active Problem List    Diagnosis Date Noted    Major depression, recurrent ([facility] ) 12/04/2018    Anal lesion 03/26/2018    Tonsil cancer ([facility] ) 03/15/2018    Hypothyroidism, adult 01/12/2018    Vitamin D deficiency 01/12/2018    Mild cognitive impairment 12/08/2017    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

## 2021-03-12 ENCOUNTER — OFFICE VISIT (OUTPATIENT)
Dept: WOUND CARE | Facility: HOSPITAL | Age: 71
End: 2021-03-12
Payer: COMMERCIAL
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
## 2021-03-12 DIAGNOSIS — M27.2 OSTEORADIONECROSIS OF MANDIBLE: ICD-10-CM

## 2021-03-12 PROCEDURE — 99183 HYPERBARIC OXYGEN THERAPY: CPT | Performed by: FAMILY MEDICINE

## 2021-03-12 PROCEDURE — G0277 HBOT, FULL BODY CHAMBER, 30M: HCPCS | Performed by: FAMILY MEDICINE

## 2021-03-12 NOTE — PROGRESS NOTES
HBO Treatment Course Details       Treatment Notes: Pt  Tolerated HBOT today without any complaints  Treatment Course Number: 19  Total Treatments Ordered:  30     Diagnosis:   1  Osteoradionecrosis of mandible  Hyperbaric oxygen [facility]       HBO Treatment Details:  Treatment Length:90 Minutes(Minutes)  Chamber #: Hard sided Monoplace Chamber    Pre-Treatment details:  Pre-treatment protocol Treatment Protocol: 2 5 DURAN X 90 minutes w/ 100% oxygen, two 5 minute air breaks  Left ear clear?: yes  Right ear clear?: yes  Left ear intact?: yes  Right ear intact?: yes                    Left ear TEED scale: Grade 0  Right ear TEED scale: Grade 0   Pretreatment heart and lung assessment: Pretreatment heart and lung auscltation unremarkable  Patient cleared for HBOT     Treatment details:  DURAN Rate: 2 5  Started Compression: 1000  Reached Compression: 1010  Total Compression Time: 10 (Minutes)  Total Holding Time: 100 (Minutes)  Started Decompression: 1150  Reached Surface: 1200  Total Decompression Time: 10 (Minutes)  Total Airbreaks: 10 (Minutes)  Total Time of Treatment: 110 (Minutes)  Symptoms Noted During Treatment: None (Minutes)    Post treatment details:  Left ear clear?: yes  Right ear clear?: yes  Left ears intact?: yes  Right ears intact?: yes                    Left ear TEED scale: Grade 0  Right ear TEED scale: Grade 0  Post treatment heart and lung assessment: Post treatment heart and lung auscltation unremarkable  Patient cleared for discharge  Tolerated treatment well         Vital Signs:  HBO Glucose Reference Range: 100-350 mg/dl   Pre-Treatment Post-Treatment   Time vitals are taken: 0955 Time vitals are taken: 1205   Blood Pressure: 146/72 Blood Pressure: 146/72   Pulse: 76 Pulse: 76   Resp: 20 Resp: 20   Temp: 96 9 °F (36 1 °C) Temp: 96 9 °F (36 1 °C)             Allergies   Allergen Reactions    Diacetylmorphine      paranoid    Morphine Other (See Comments)     "confusion & paranoid behavior"  Pineapple Tongue Swelling     Tongue bleeding     Patient Active Problem List    Diagnosis Date Noted    Major depression, recurrent ([facility] ) 12/04/2018    Anal lesion 03/26/2018    Tonsil cancer ([facility] ) 03/15/2018    Hypothyroidism, adult 01/12/2018    Vitamin D deficiency 01/12/2018    Mild cognitive impairment 12/08/2017    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

## 2021-03-15 ENCOUNTER — OFFICE VISIT (OUTPATIENT)
Dept: WOUND CARE | Facility: HOSPITAL | Age: 71
End: 2021-03-15
Payer: COMMERCIAL

## 2021-03-15 DIAGNOSIS — M27.2 OSTEORADIONECROSIS OF MANDIBLE: ICD-10-CM
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
## 2021-03-15 PROCEDURE — G0277 HBOT, FULL BODY CHAMBER, 30M: HCPCS

## 2021-03-15 PROCEDURE — 99183 HYPERBARIC OXYGEN THERAPY: CPT | Performed by: PHYSICIAN ASSISTANT

## 2021-03-15 NOTE — PROGRESS NOTES
HBO Treatment Course Details       Treatment Notes: Pt  Tolerated HBOT today without any complaints  Treatment Course Number: 20  Total Treatments Ordered:  30     Diagnosis:   1  Osteoradionecrosis of mandible  [facility]       HBO Treatment Details:  Treatment Length:90 Minutes(Minutes)  Chamber #: Hard sided Monoplace Chamber    Pre-Treatment details:  Pre-treatment protocol Treatment Protocol: 2 5 DURAN X 90 minutes w/ 100% oxygen, two 5 minute air breaks  Left ear clear?: yes  Right ear clear?: yes  Left ear intact?: yes  Right ear intact?: yes        PE Tubes present, Left ear?: no  PE Tubes present, Right ear?: no  Left ear irrigated?: no  Right ear irrigated?: no  Left ear TEED scale: Grade 0  Right ear TEED scale: Grade 0   Pretreatment heart and lung assessment: Pretreatment heart and lung auscltation unremarkable  Patient cleared for HBOT     Treatment details:  DURAN Rate: 2 5  Started Compression: 1036  Reached Compression: 1046  Total Compression Time: 10 (Minutes)  Total Holding Time: 100 (Minutes)  Started Decompression: 1226  Reached Surface: 4062  Total Decompression Time: 10 (Minutes)  Total Airbreaks: 10 (Minutes)  Total Time of Treatment: 110 (Minutes)  Symptoms Noted During Treatment: None (Minutes)    Post treatment details:  Left ear clear?: yes  Right ear clear?: yes  Left ears intact?: yes  Right ears intact?: yes        PE Tubes present, Left ear?: no  PE Tubes present, Right ear?: no        Left ear TEED scale: Grade 0  Right ear TEED scale: Grade 0  Post treatment heart and lung assessment: Post treatment heart and lung auscltation unremarkable  Patient cleared for discharge  Tolerated treatment well         Vital Signs:  HBO Glucose Reference Range: 100-350 mg/dl   Pre-Treatment Post-Treatment   Time vitals are taken: 1025 Time vitals are taken: 1240   Blood Pressure: 120/70 Blood Pressure: 150/76   Pulse: 84 Pulse: 80   Resp: 16 Resp: 18   Temp: 96 2 °F (35 7 °C) Temp: 96 °F (35 6 °C)             Allergies   Allergen Reactions    Diacetylmorphine      paranoid    Morphine Other (See Comments)     "confusion & paranoid behavior"    Pineapple Tongue Swelling     Tongue bleeding     Patient Active Problem List    Diagnosis Date Noted    Major depression, recurrent ([facility] ) 12/04/2018    Anal lesion 03/26/2018    Tonsil cancer ([facility] ) 03/15/2018    Hypothyroidism, adult 01/12/2018    Vitamin D deficiency 01/12/2018    Mild cognitive impairment 12/08/2017    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

## 2021-03-22 ENCOUNTER — OFFICE VISIT (OUTPATIENT)
Dept: WOUND CARE | Facility: HOSPITAL | Age: 71
End: 2021-03-22
Payer: COMMERCIAL

## 2021-03-22 DIAGNOSIS — M27.2 OSTEORADIONECROSIS OF MANDIBLE: ICD-10-CM
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
## 2021-03-22 PROCEDURE — G0277 HBOT, FULL BODY CHAMBER, 30M: HCPCS

## 2021-03-22 PROCEDURE — NS001 PR NO SIGNATURE OR ATTESTATION: Performed by: PHYSICIAN ASSISTANT

## 2021-03-22 NOTE — PROGRESS NOTES
Patient returns for continued HBO treatments post oral surgery  She reports she has no dental "pain" but that she feels "pressure" occasionally  She denies trouble clearing ears during her treatments, denies sinus problems and denies visual changes  She reported that she "had a bit of a headache" during her treatment today and also that she had some "leg and back discomfort"  She describes both as "nothing major" and that she generally she takes Tylenol regularly  She stated she takes a dose "around 8 AM" daily  It was suggested she take it just prior to her HBO treatment for maximum benefit, which she states she "will try"  She is happy to be "one step closer to her much needed hip replacement surgery"  She is tolerating her treatments with no untoward effects

## 2021-03-22 NOTE — PROGRESS NOTES
HBO Treatment Course Details       Treatment Notes: Pt  Tolerated HBOT today  Denies ear pain  Treatment Course Number: 21  Total Treatments Ordered:  30     Diagnosis:   1  Osteoradionecrosis of mandible  [facility]       H[facility] Treatment Details:  Treatment Length:90 Minutes(Minutes)  Chamber #: Hard sided Monoplace Chamber    Pre-Treatment details:  Pre-treatment protocol Treatment Protocol: 2 5 DURAN X 90 minutes w/ 100% oxygen, two 5 minute air breaks  Left ear clear?: yes  Right ear clear?: yes  Left ear intact?: yes  Right ear intact?: yes        PE Tubes present, Left ear?: no  PE Tubes present, Right ear?: no  Left ear irrigated?: no  Right ear irrigated?: no  Left ear TEED scale: Grade 0  Right ear TEED scale: Grade 0   Pretreatment heart and lung assessment: Pretreatment heart and lung auscltation unremarkable  Patient cleared for HBOT     Treatment details:  DURAN Rate: 2 5  Started Compression: 1000  Reached Compression: 1010  Total Compression Time: 10 (Minutes)  Total Holding Time: 100 (Minutes)  Started Decompression: 1150  Reached Surface: 1200  Total Decompression Time: 10 (Minutes)  Total Airbreaks: 10 (Minutes)  Total Time of Treatment: 110 (Minutes)  Symptoms Noted During Treatment: None (Minutes)    Post treatment details:  Left ear clear?: yes  Right ear clear?: yes  Left ears intact?: yes  Right ears intact?: yes        PE Tubes present, Left ear?: no  PE Tubes present, Right ear?: no        Left ear TEED scale: Grade 0  Right ear TEED scale: Grade 0  Post treatment heart and lung assessment: Post treatment heart and lung auscltation unremarkable  Patient cleared for discharge  Tolerated treatment well         Vital Signs:  [facility] Glucose Reference Range: 100-350 mg/dl   Pre-Treatment Post-Treatment   Time vitals are taken: 0955 Time vitals are taken: 1205   Blood Pressure: 100/60 Blood Pressure: 118/62   Pulse: 72 Pulse: 88     Resp: 20   Temp: 96 8 °F (36 °C) Temp: 96 3 °F (35 7 °C)             Allergies   Allergen Reactions    Diacetylmorphine      paranoid    Morphine Other (See Comments)     "confusion & paranoid behavior"    Pineapple Tongue Swelling     Tongue bleeding     Patient Active Problem List    Diagnosis Date Noted    Major depression, recurrent ([place] [address] ) 12/04/2018    Anal lesion 03/26/2018    Tonsil cancer ([place] [address] ) 03/15/2018    Hypothyroidism, adult 01/12/2018    Vitamin D deficiency 01/12/2018    Mild cognitive impairment 12/08/2017    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

## 2021-03-23 ENCOUNTER — OFFICE VISIT (OUTPATIENT)
Dept: WOUND CARE | Facility: HOSPITAL | Age: 71
End: 2021-03-23
Payer: COMMERCIAL
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
## 2021-03-23 DIAGNOSIS — M27.2 OSTEORADIONECROSIS OF MANDIBLE: ICD-10-CM

## 2021-03-23 PROCEDURE — G0277 HBOT, FULL BODY CHAMBER, 30M: HCPCS

## 2021-03-23 PROCEDURE — 99183 HYPERBARIC OXYGEN THERAPY: CPT | Performed by: PHYSICIAN ASSISTANT

## 2021-03-23 NOTE — PROGRESS NOTES
HBO Treatment Course Details       Treatment Notes: Pt  Tolerated HBOT treatment today without any complaints  Treatment Course Number: 22  Total Treatments Ordered:  30     Diagnosis:   1  Osteoradionecrosis of mandible  [facility]       HBO Treatment Details:  Treatment Length:90 Minutes(Minutes)  Chamber #: Hard sided Monoplace Chamber    Pre-Treatment details:  Pre-treatment protocol Treatment Protocol: 2 5 DURAN X 90 minutes w/ 100% oxygen, two 5 minute air breaks  Left ear clear?: yes  Right ear clear?: yes  Left ear intact?: yes  Right ear intact?: yes        PE Tubes present, Left ear?: no  PE Tubes present, Right ear?: no  Left ear irrigated?: no  Right ear irrigated?: no  Left ear TEED scale: Grade 0  Right ear TEED scale: Grade 0   Pretreatment heart and lung assessment: Pretreatment heart and lung auscltation unremarkable  Patient cleared for HBOT     Treatment details:  DURAN Rate: 2 5  Started Compression: 0940  Reached Compression: 0950  Total Compression Time: 10 (Minutes)  Total Holding Time: 100 (Minutes)  Started Decompression: 1130  Reached Surface: 1140  Total Decompression Time: 10 (Minutes)  Total Airbreaks: 10 (Minutes)  Total Time of Treatment: 110 (Minutes)  Symptoms Noted During Treatment: None (Minutes)    Post treatment details:  Left ear clear?: yes  Right ear clear?: yes  Left ears intact?: yes  Right ears intact?: yes        PE Tubes present, Left ear?: no  PE Tubes present, Right ear?: no        Left ear TEED scale: Grade 0  Right ear TEED scale: Grade 0  Post treatment heart and lung assessment: Post treatment heart and lung auscltation unremarkable  Patient cleared for discharge  Tolerated treatment well         Vital Signs:  HBO Glucose Reference Range: 100-350 mg/dl   Pre-Treatment Post-Treatment   Time vitals are taken: 0945 Time vitals are taken: 1145   Blood Pressure: 100/60 Blood Pressure: 98/68   Pulse: 80 Pulse: 78   Resp: 16 Resp: 20   Temp: 97 5 °F (36 4 °C) Temp: 96 5 °F (35 8 °C)             Allergies   Allergen Reactions    Diacetylmorphine      paranoid    Morphine Other (See Comments)     "confusion & paranoid behavior"    Pineapple Tongue Swelling     Tongue bleeding     Patient Active Problem List    Diagnosis Date Noted    Major depression, recurrent ([facility] 75 ) 12/04/2018    Anal lesion 03/26/2018    Tonsil cancer ([facility] 75 ) 03/15/2018    Hypothyroidism, adult 01/12/2018    Vitamin D deficiency 01/12/2018    Mild cognitive impairment 12/08/2017    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[facility] 75 ) 10/20/2016    Chronic fatigue 08/02/2016    Osteoporosis 12/04/2014    Hyperlipidemia 10/27/2014    Fibromyalgia 06/04/2012    Depression 05/23/2012    Generalized anxiety disorder 05/23/2012     No orders of the defined types were placed in this encounter

## 2021-03-24 ENCOUNTER — OFFICE VISIT (OUTPATIENT)
Dept: WOUND CARE | Facility: HOSPITAL | Age: 71
End: 2021-03-24
Payer: COMMERCIAL
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
## 2021-03-24 DIAGNOSIS — M27.2 OSTEORADIONECROSIS OF MANDIBLE: ICD-10-CM

## 2021-03-24 PROCEDURE — G0277 HBOT, FULL BODY CHAMBER, 30M: HCPCS | Performed by: FAMILY MEDICINE

## 2021-03-24 PROCEDURE — 99183 HYPERBARIC OXYGEN THERAPY: CPT | Performed by: FAMILY MEDICINE

## 2021-03-24 NOTE — PROGRESS NOTES
HBO Treatment Course Details       Treatment Notes: Pt  Tolerated HBOT today without any complaints  Treatment Course Number: 23  Total Treatments Ordered:  30     Diagnosis:   1  Osteoradionecrosis of mandible  [facility]       HBO Treatment Details:  Treatment Length:90 Minutes(Minutes)  Chamber #: Hard sided Monoplace Chamber    Pre-Treatment details:  Pre-treatment protocol Treatment Protocol: 2 5 DURAN X 90 minutes w/ 100% oxygen, two 5 minute air breaks  Left ear clear?: yes  Right ear clear?: yes  Left ear intact?: yes  Right ear intact?: yes                    Left ear TEED scale: Grade 0  Right ear TEED scale: Grade 0   Pretreatment heart and lung assessment: Pretreatment heart and lung auscltation unremarkable  Patient cleared for HBOT     Treatment details:  DURAN Rate: 2 5  Started Compression: 0956  Reached Compression: 1006  Total Compression Time: 10 (Minutes)  Total Holding Time: 100 (Minutes)  Started Decompression: 1146  Reached Surface: 8755  Total Decompression Time: 10 (Minutes)  Total Airbreaks: 10 (Minutes)  Total Time of Treatment: 110 (Minutes)  Symptoms Noted During Treatment: None (Minutes)    Post treatment details:  Left ear clear?: yes  Right ear clear?: yes  Left ears intact?: yes  Right ears intact?: yes                          Post treatment heart and lung assessment: Post treatment heart and lung auscltation unremarkable  Patient cleared for discharge  Tolerated treatment well         Vital Signs:  HBO Glucose Reference Range: 100-350 mg/dl   Pre-Treatment Post-Treatment   Time vitals are taken: 0945 Time vitals are taken: 1200   Blood Pressure: 106/64 Blood Pressure: 120/60   Pulse: 72 Pulse: 72   Resp: 18 Resp: 16   Temp: 95 7 °F (35 4 °C) Temp: 96 °F (35 6 °C)             Allergies   Allergen Reactions    Diacetylmorphine      paranoid    Morphine Other (See Comments)     "confusion & paranoid behavior"    Pineapple Tongue Swelling     Tongue bleeding Patient Active Problem List    Diagnosis Date Noted    Major depression, recurrent ([facility] 75 ) 12/04/2018    Anal lesion 03/26/2018    Tonsil cancer ([facility] 75 ) 03/15/2018    Hypothyroidism, adult 01/12/2018    Vitamin D deficiency 01/12/2018    Mild cognitive impairment 12/08/2017    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[facility] 75 ) 10/20/2016    Chronic fatigue 08/02/2016    Osteoporosis 12/04/2014    Hyperlipidemia 10/27/2014    Fibromyalgia 06/04/2012    Depression 05/23/2012    Generalized anxiety disorder 05/23/2012

## 2021-03-25 ENCOUNTER — OFFICE VISIT (OUTPATIENT)
Dept: WOUND CARE | Facility: HOSPITAL | Age: 71
End: 2021-03-25
Payer: COMMERCIAL
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
## 2021-03-25 DIAGNOSIS — M27.2 OSTEORADIONECROSIS OF MANDIBLE: ICD-10-CM

## 2021-03-25 PROCEDURE — 99183 HYPERBARIC OXYGEN THERAPY: CPT | Performed by: FAMILY MEDICINE

## 2021-03-25 PROCEDURE — G0277 HBOT, FULL BODY CHAMBER, 30M: HCPCS | Performed by: FAMILY MEDICINE

## 2021-03-25 NOTE — PROGRESS NOTES
HBO Treatment Course Details       Treatment Notes: Pt  Tolerated HBOT today without any complaints  Treatment Course Number: 24  Total Treatments Ordered:  30     Diagnosis:   1  Osteoradionecrosis of mandible  [facility]       HBO Treatment Details:  Treatment Length:90 Minutes(Minutes)  Chamber #: Hard sided Monoplace Chamber    Pre-Treatment details:  Pre-treatment protocol Treatment Protocol: 2 5 DURAN X 90 minutes w/ 100% oxygen, two 5 minute air breaks  Left ear clear?: yes  Right ear clear?: yes  Left ear intact?: yes  Right ear intact?: yes                    Left ear TEED scale: Grade 0  Right ear TEED scale: Grade 0   Pretreatment heart and lung assessment: Pretreatment heart and lung auscltation unremarkable  Patient cleared for HBOT     Treatment details:  DURAN Rate: 2 5  Started Compression: 0948  Reached Compression: 0958  Total Compression Time: 10 (Minutes)  Total Holding Time: 100 (Minutes)  Started Decompression: 1138  Reached Surface: 1148  Total Decompression Time: 10 (Minutes)  Total Airbreaks: 10 (Minutes)  Total Time of Treatment: 110 (Minutes)  Symptoms Noted During Treatment: None (Minutes)    Post treatment details:  Left ear clear?: yes  Right ear clear?: yes  Left ears intact?: yes  Right ears intact?: yes                    Left ear TEED scale: Grade 0  Right ear TEED scale: Grade 0  Post treatment heart and lung assessment: Post treatment heart and lung auscltation unremarkable  Patient cleared for discharge  Tolerated treatment well         Vital Signs:  HBO Glucose Reference Range: 100-350 mg/dl   Pre-Treatment Post-Treatment   Time vitals are taken: 0945 Time vitals are taken: 1150   Blood Pressure: 100/60 Blood Pressure: 120/62   Pulse: 80 Pulse: 76   Resp: 16 Resp: 16   Temp: 96 1 °F (35 6 °C) Temp: 96 °F (35 6 °C)             Allergies   Allergen Reactions    Diacetylmorphine      paranoid    Morphine Other (See Comments)     "confusion & paranoid behavior"    Pineapple Tongue Swelling     Tongue bleeding     Patient Active Problem List    Diagnosis Date Noted    Major depression, recurrent ([facility] 75 ) 12/04/2018    Anal lesion 03/26/2018    Tonsil cancer ([facility] 75 ) 03/15/2018    Hypothyroidism, adult 01/12/2018    Vitamin D deficiency 01/12/2018    Mild cognitive impairment 12/08/2017    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[facility] 75 ) 10/20/2016    Chronic fatigue 08/02/2016    Osteoporosis 12/04/2014    Hyperlipidemia 10/27/2014    Fibromyalgia 06/04/2012    Depression 05/23/2012    Generalized anxiety disorder 05/23/2012

## 2021-03-26 ENCOUNTER — OFFICE VISIT (OUTPATIENT)
Dept: WOUND CARE | Facility: HOSPITAL | Age: 71
End: 2021-03-26
Payer: COMMERCIAL
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
## 2021-03-26 DIAGNOSIS — M27.2 OSTEORADIONECROSIS OF MANDIBLE: ICD-10-CM

## 2021-03-26 PROCEDURE — G0277 HBOT, FULL BODY CHAMBER, 30M: HCPCS | Performed by: FAMILY MEDICINE

## 2021-03-26 PROCEDURE — 99183 HYPERBARIC OXYGEN THERAPY: CPT | Performed by: FAMILY MEDICINE

## 2021-03-26 NOTE — PROGRESS NOTES
HBO Treatment Course Details       Treatment Notes: Pt  Tolerated HBOT today well without any complaints  Treatment Course Number: 25  Total Treatments Ordered:  30     Diagnosis:   1  Osteoradionecrosis of mandible  [facility]       H[facility] Treatment Details:  Treatment Length:90 Minutes(Minutes)  Chamber #: Hard sided Monoplace Chamber    Pre-Treatment details:  Pre-treatment protocol Treatment Protocol: 2 5 DURAN X 90 minutes w/ 100% oxygen, two 5 minute air breaks  Left ear clear?: yes  Right ear clear?: yes  Left ear intact?: yes  Right ear intact?: yes                    Left ear TEED scale: Grade 0  Right ear TEED scale: Grade 0   Pretreatment heart and lung assessment: Pretreatment heart and lung auscltation unremarkable  Patient cleared for HBOT     Treatment details:  DURAN Rate: 2 5  Started Compression: 0945  Reached Compression: 0955  Total Compression Time: 10 (Minutes)  Total Holding Time: 100 (Minutes)  Started Decompression: 1135  Reached Surface: 1145  Total Decompression Time: 10 (Minutes)  Total Airbreaks: 10 (Minutes)  Total Time of Treatment: 110 (Minutes)  Symptoms Noted During Treatment: None (Minutes)    Post treatment details:  Left ear clear?: yes  Right ear clear?: yes  Left ears intact?: yes  Right ears intact?: yes                    Left ear TEED scale: Grade 0  Right ear TEED scale: Grade 0  Post treatment heart and lung assessment: Post treatment heart and lung auscltation unremarkable  Patient cleared for discharge  Tolerated treatment well         Vital Signs:  HBO Glucose Reference Range: 100-350 mg/dl   Pre-Treatment Post-Treatment   Time vitals are taken: 0942 Time vitals are taken: 1150   Blood Pressure: 100/62 Blood Pressure: 108/58   Pulse: 80 Pulse: 80   Resp: 18 Resp: 18   Temp: 98 4 °F (36 9 °C) Temp: 95 2 °F (35 1 °C)             Allergies   Allergen Reactions    Diacetylmorphine      paranoid    Morphine Other (See Comments)     "confusion & paranoid behavior"    Pineapple Tongue Swelling     Tongue bleeding     Patient Active Problem List    Diagnosis Date Noted    Major depression, recurrent ([facility] 75 ) 12/04/2018    Anal lesion 03/26/2018    Tonsil cancer ([facility] 75 ) 03/15/2018    Hypothyroidism, adult 01/12/2018    Vitamin D deficiency 01/12/2018    Mild cognitive impairment 12/08/2017    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[facility] 75 ) 10/20/2016    Chronic fatigue 08/02/2016    Osteoporosis 12/04/2014    Hyperlipidemia 10/27/2014    Fibromyalgia 06/04/2012    Depression 05/23/2012    Generalized anxiety disorder 05/23/2012

## 2021-03-28 NOTE — PROGRESS NOTES
Assessment & Plan:     F33 1 Moderate episode of recurrent major depressive disorder (HCC)      M81 0  Age-related osteoporosis without current pathological fracture     F41 1  Generalized anxiety disorder    N18 2 Stage 2 chronic kidney disease      G31 84 Mild cognitive impairment     E55 9  VItamin D deficiency     E78 01  Familial hypercholesterolemia          Subjective:     Patient ID: Elsi Beckham is a 79 y o  female     No chief complaint on file  HPI    Review of Systems    Objective: There were no vitals taken for this visit      Problem List Items Addressed This Visit     None          Physical Exam

## 2021-03-29 ENCOUNTER — OFFICE VISIT (OUTPATIENT)
Dept: WOUND CARE | Facility: HOSPITAL | Age: 71
End: 2021-03-29
Payer: COMMERCIAL
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
## 2021-03-29 DIAGNOSIS — M27.2 OSTEORADIONECROSIS OF MANDIBLE: ICD-10-CM

## 2021-03-29 PROCEDURE — G0277 HBOT, FULL BODY CHAMBER, 30M: HCPCS

## 2021-03-29 PROCEDURE — 99183 HYPERBARIC OXYGEN THERAPY: CPT | Performed by: PHYSICIAN ASSISTANT

## 2021-03-29 NOTE — PROGRESS NOTES
HBO Treatment Course Details       Treatment Notes: Pt  Tolerated HBOT today without any adverse events  Pt  Did complain of hip pain which she is scheduled for surgery next month  Treatment Course Number: 26  Total Treatments Ordered:  30     Diagnosis:   1  Osteoradionecrosis of mandible  Hyperbaric oxygen [facility]       HBO Treatment Details:  Treatment Length:90 Minutes(Minutes)  Chamber #: Hard sided Monoplace Chamber    Pre-Treatment details:  Pre-treatment protocol Treatment Protocol: 2 5 DURAN X 90 minutes w/ 100% oxygen, two 5 minute air breaks  Left ear clear?: yes  Right ear clear?: yes  Left ear intact?: yes  Right ear intact?: yes        PE Tubes present, Left ear?: no  PE Tubes present, Right ear?: no  Left ear irrigated?: no  Right ear irrigated?: no  Left ear TEED scale: Grade 0  Right ear TEED scale: Grade 0   Pretreatment heart and lung assessment: Pretreatment heart and lung auscltation unremarkable  Patient cleared for HBOT     Treatment details:  DURAN Rate: 2 5  Started Compression: 0958  Reached Compression: 1008  Total Compression Time: 10 (Minutes)  Total Holding Time: 100 (Minutes)  Started Decompression: 1148  Reached Surface: 1158  Total Decompression Time: 10 (Minutes)  Total Airbreaks: 10 (Minutes)  Total Time of Treatment: 110 (Minutes)  Symptoms Noted During Treatment: None (Minutes)    Post treatment details:  Left ear clear?: yes  Right ear clear?: yes  Left ears intact?: yes  Right ears intact?: yes        PE Tubes present, Left ear?: no  PE Tubes present, Right ear?: no        Left ear TEED scale: Grade 0  Right ear TEED scale: Grade 0  Post treatment heart and lung assessment: Post treatment heart and lung auscltation unremarkable  Patient cleared for discharge  Tolerated treatment well         Vital Signs:  HBO Glucose Reference Range: 100-350 mg/dl   Pre-Treatment Post-Treatment   Time vitals are taken: 0955 Time vitals are taken: 1200   Blood Pressure: 116/62 Blood Pressure: 120/68   Pulse: 80 Pulse: 76   Resp: 18 Resp: 18   Temp: 96 5 °F (35 8 °C) Temp: 96 1 °F (35 6 °C)             Allergies   Allergen Reactions    Diacetylmorphine      paranoid    Morphine Other (See Comments)     "confusion & paranoid behavior"    Pineapple Tongue Swelling     Tongue bleeding     Patient Active Problem List    Diagnosis Date Noted    Major depression, recurrent ([facility] 75 ) 12/04/2018    Anal lesion 03/26/2018    Tonsil cancer ([place] [address] ) 03/15/2018    Hypothyroidism, adult 01/12/2018    Vitamin D deficiency 01/12/2018    Mild cognitive impairment 12/08/2017    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[facility] 75 ) 10/20/2016    Chronic fatigue 08/02/2016    Osteoporosis 12/04/2014    Hyperlipidemia 10/27/2014    Fibromyalgia 06/04/2012    Depression 05/23/2012    Generalized anxiety disorder 05/23/2012

## 2021-03-30 ENCOUNTER — OFFICE VISIT (OUTPATIENT)
Dept: WOUND CARE | Facility: HOSPITAL | Age: 71
End: 2021-03-30
Payer: COMMERCIAL
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
## 2021-03-30 DIAGNOSIS — M27.2 OSTEORADIONECROSIS OF MANDIBLE: ICD-10-CM

## 2021-03-30 PROCEDURE — G0277 HBOT, FULL BODY CHAMBER, 30M: HCPCS

## 2021-03-30 PROCEDURE — 99183 HYPERBARIC OXYGEN THERAPY: CPT | Performed by: PHYSICIAN ASSISTANT

## 2021-03-30 NOTE — PROGRESS NOTES
HBO Treatment Course Details       Treatment Notes: Pt  Tolerated HBOT today without any complaints  Treatment Course Number: 27  Total Treatments Ordered:  30     Diagnosis:   1  Osteoradionecrosis of mandible  [facility]       [facility] Treatment Details:  Treatment Length:90 Minutes(Minutes)  Chamber #: Hard sided Monoplace Chamber    Pre-Treatment details:  Pre-treatment protocol Treatment Protocol: 2 5 DURAN X 90 minutes w/ 100% oxygen, two 5 minute air breaks  Left ear clear?: yes  Right ear clear?: yes  Left ear intact?: yes  Right ear intact?: yes        PE Tubes present, Left ear?: no  PE Tubes present, Right ear?: no  Left ear irrigated?: no  Right ear irrigated?: no  Left ear TEED scale: Grade 0  Right ear TEED scale: Grade 0   Pretreatment heart and lung assessment: Pretreatment heart and lung auscltation unremarkable  Patient cleared for HBOT     Treatment details:  DURAN Rate: 2 5  Started Compression: 1000  Reached Compression: 1010  Total Compression Time: 10 (Minutes)  Total Holding Time: 100 (Minutes)  Started Decompression: 1150  Reached Surface: 1200  Total Decompression Time: 10 (Minutes)  Total Airbreaks: 10 (Minutes)  Total Time of Treatment: 110 (Minutes)  Symptoms Noted During Treatment: None (Minutes)    Post treatment details:  Left ear clear?: yes  Right ear clear?: yes  Left ears intact?: yes  Right ears intact?: yes        PE Tubes present, Left ear?: no  PE Tubes present, Right ear?: no        Left ear TEED scale: Grade 0  Right ear TEED scale: Grade 0  Post treatment heart and lung assessment: Post treatment heart and lung auscltation unremarkable  Patient cleared for discharge  Tolerated treatment well         Vital Signs:  HBO Glucose Reference Range: 100-350 mg/dl   Pre-Treatment Post-Treatment   Time vitals are taken: 0955 Time vitals are taken: 1205   Blood Pressure: 120/60 Blood Pressure: 120/60   Pulse: 72 Pulse: 68   Resp: 16 Resp: 18   Temp: 95 8 °F (35 4 °C) Temp: 96 2 °F (35 7 °C)             Allergies   Allergen Reactions    Diacetylmorphine      paranoid    Morphine Other (See Comments)     "confusion & paranoid behavior"    Pineapple Tongue Swelling     Tongue bleeding     Patient Active Problem List    Diagnosis Date Noted    Major depression, recurrent ([facility] 75 ) 12/04/2018    Anal lesion 03/26/2018    Tonsil cancer ([place] [address] ) 03/15/2018    Hypothyroidism, adult 01/12/2018    Vitamin D deficiency 01/12/2018    Mild cognitive impairment 12/08/2017    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[facility] 75 ) 10/20/2016    Chronic fatigue 08/02/2016    Osteoporosis 12/04/2014    Hyperlipidemia 10/27/2014    Fibromyalgia 06/04/2012    Depression 05/23/2012    Generalized anxiety disorder 05/23/2012     No orders of the defined types were placed in this encounter

## 2021-03-31 ENCOUNTER — OFFICE VISIT (OUTPATIENT)
Dept: WOUND CARE | Facility: HOSPITAL | Age: 71
End: 2021-03-31
Payer: COMMERCIAL
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
## 2021-03-31 DIAGNOSIS — M27.2 OSTEORADIONECROSIS OF MANDIBLE: ICD-10-CM

## 2021-03-31 PROCEDURE — G0277 HBOT, FULL BODY CHAMBER, 30M: HCPCS | Performed by: FAMILY MEDICINE

## 2021-03-31 PROCEDURE — 99183 HYPERBARIC OXYGEN THERAPY: CPT | Performed by: FAMILY MEDICINE

## 2021-03-31 NOTE — PROGRESS NOTES
HBO Treatment Course Details       Treatment Notes: Pt  Tolerated HBOT today without any complaints  Treatment Course Number: 28  Total Treatments Ordered:  30     Diagnosis:   1  Osteoradionecrosis of mandible  Hyperbaric oxygen thearp       HBO Treatment Details:  Treatment Length:90 Minutes(Minutes)  Chamber #: Hard sided Monoplace Chamber    Pre-Treatment details:  Pre-treatment protocol Treatment Protocol: 2 5 DURAN X 90 minutes w/ 100% oxygen, two 5 minute air breaks  Left ear clear?: yes  Right ear clear?: yes  Left ear intact?: yes  Right ear intact?: yes                    Left ear TEED scale: Grade 0  Right ear TEED scale: Grade 0   Pretreatment heart and lung assessment: Pretreatment heart and lung auscltation unremarkable  Patient cleared for HBOT     Treatment details:  DURAN Rate: 2 5  Started Compression: 0957  Reached Compression: 1007  Total Compression Time: 10 (Minutes)  Total Holding Time: 100 (Minutes)  Started Decompression: 1147  Reached Surface: 5773  Total Decompression Time: 10 (Minutes)  Total Airbreaks: 10 (Minutes)  Total Time of Treatment: 110 (Minutes)  Symptoms Noted During Treatment:   (Minutes)    Post treatment details:  Left ear clear?: yes  Right ear clear?: yes  Left ears intact?: yes  Right ears intact?: yes                    Left ear TEED scale: Grade 0  Right ear TEED scale: Grade 0  Post treatment heart and lung assessment: Post treatment heart and lung auscltation unremarkable  Patient cleared for discharge  Tolerated treatment well         Vital Signs:  HBO Glucose Reference Range: 100-350 mg/dl   Pre-Treatment Post-Treatment   Time vitals are taken: 0945 Time vitals are taken: 1200   Blood Pressure: 100/68 Blood Pressure: 120/64   Pulse: 64 Pulse: 72   Resp: 16 Resp: 16   Temp: 96 7 °F (35 9 °C) Temp: 96 °F (35 6 °C)             Allergies   Allergen Reactions    Diacetylmorphine      paranoid    Morphine Other (See Comments)     "confusion & paranoid behavior"    Pineapple - Food Allergy Tongue Swelling     Tongue bleeding     Patient Active Problem List    Diagnosis Date Noted    Major depression, recurrent ([facility] ) 12/04/2018    Anal lesion 03/26/2018    Tonsil cancer ([facility] ) 03/15/2018    Hypothyroidism, adult 01/12/2018    Vitamin D deficiency 01/12/2018    Mild cognitive impairment 12/08/2017    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

## 2021-04-01 ENCOUNTER — OFFICE VISIT (OUTPATIENT)
Dept: FAMILY MEDICINE CLINIC | Facility: CLINIC | Age: 71
End: 2021-04-01
Payer: COMMERCIAL

## 2021-04-01 ENCOUNTER — OFFICE VISIT (OUTPATIENT)
Dept: WOUND CARE | Facility: HOSPITAL | Age: 71
End: 2021-04-01
Payer: COMMERCIAL

## 2021-04-01 VITALS
DIASTOLIC BLOOD PRESSURE: 78 MMHG | OXYGEN SATURATION: 96 % | TEMPERATURE: 96.8 F | RESPIRATION RATE: 16 BRPM | SYSTOLIC BLOOD PRESSURE: 122 MMHG | BODY MASS INDEX: 24.88 KG/M2 | WEIGHT: 140.4 LBS | HEIGHT: 63 IN | HEART RATE: 80 BPM

## 2021-04-01 DIAGNOSIS — M79.7 FIBROMYALGIA: ICD-10-CM

## 2021-04-01 DIAGNOSIS — Z01.818 PREOPERATIVE CLEARANCE: Primary | ICD-10-CM

## 2021-04-01 DIAGNOSIS — N18.32 STAGE 3B CHRONIC KIDNEY DISEASE (HCC): ICD-10-CM
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
## 2021-04-01 DIAGNOSIS — F41.1 GENERALIZED ANXIETY DISORDER: ICD-10-CM

## 2021-04-01 DIAGNOSIS — M27.2 OSTEORADIONECROSIS OF MANDIBLE: ICD-10-CM

## 2021-04-01 DIAGNOSIS — F33.1 MODERATE EPISODE OF RECURRENT MAJOR DEPRESSIVE DISORDER (HCC): ICD-10-CM

## 2021-04-01 DIAGNOSIS — M16.12 PRIMARY OSTEOARTHRITIS OF LEFT HIP: ICD-10-CM

## 2021-04-01 PROCEDURE — 99183 HYPERBARIC OXYGEN THERAPY: CPT | Performed by: FAMILY MEDICINE

## 2021-04-01 PROCEDURE — G0277 HBOT, FULL BODY CHAMBER, 30M: HCPCS | Performed by: FAMILY MEDICINE

## 2021-04-01 PROCEDURE — 1036F TOBACCO NON-USER: CPT | Performed by: FAMILY MEDICINE

## 2021-04-01 PROCEDURE — 99214 OFFICE O/P EST MOD 30 MIN: CPT | Performed by: FAMILY MEDICINE

## 2021-04-01 RX ORDER — VENLAFAXINE HYDROCHLORIDE 37.5 MG/1
CAPSULE, EXTENDED RELEASE ORAL
Qty: 60 CAPSULE | Refills: 5 | Status: SHIPPED | OUTPATIENT
Start: 2021-04-01 | End: 2021-11-19 | Stop reason: SDUPTHER

## 2021-04-01 RX ORDER — CELECOXIB 200 MG/1
200 CAPSULE ORAL 2 TIMES DAILY
Status: CANCELLED | OUTPATIENT
Start: 2021-04-01

## 2021-04-01 NOTE — PROGRESS NOTES
HBO Treatment Course Details       Treatment Notes: Pt  Tolerated HBOT today without any complaints  Treatment Course Number: 29  Total Treatments Ordered:  30     Diagnosis:   1  Osteoradionecrosis of mandible  [facility]       HBO Treatment Details:  Treatment Length:90 Minutes(Minutes)  Chamber #: Hard sided Monoplace Chamber    Pre-Treatment details:  Pre-treatment protocol Treatment Protocol: 2 5 DURAN X 90 minutes w/ 100% oxygen, two 5 minute air breaks  Left ear clear?: yes  Right ear clear?: yes  Left ear intact?: yes  Right ear intact?: yes                    Left ear TEED scale: Grade 0  Right ear TEED scale: Grade 0   Pretreatment heart and lung assessment: Pretreatment heart and lung auscltation unremarkable  Patient cleared for HBOT     Treatment details:  DURAN Rate: 2 5  Started Compression: 0957  Reached Compression: 1007  Total Compression Time: 10 (Minutes)  Total Holding Time: 100 (Minutes)  Started Decompression: 1147  Reached Surface: 9032  Total Decompression Time: 10 (Minutes)  Total Airbreaks: 10 (Minutes)  Total Time of Treatment: 110 (Minutes)  Symptoms Noted During Treatment: None (Minutes)    Post treatment details:  Left ear clear?: yes  Right ear clear?: yes  Left ears intact?: yes  Right ears intact?: yes                    Left ear TEED scale: Grade 0  Right ear TEED scale: Grade 0  Post treatment heart and lung assessment: Post treatment heart and lung auscltation unremarkable  Patient cleared for discharge  Tolerated treatment well         Vital Signs:  HBO Glucose Reference Range: 100-350 mg/dl   Pre-Treatment Post-Treatment   Time vitals are taken: 0950 Time vitals are taken: 1200   Blood Pressure: 98/58 Blood Pressure: 110/60   Pulse: 68 Pulse: 70   Resp: 16 Resp: 14   Temp: 95 5 °F (35 3 °C) Temp: 96 2 °F (35 7 °C)             Allergies   Allergen Reactions    Diacetylmorphine      paranoid    Morphine Other (See Comments)     "confusion & paranoid behavior"    Pineapple - Food Allergy Tongue Swelling     Tongue bleeding     Patient Active Problem List    Diagnosis Date Noted    Major depression, recurrent ([facility] ) 12/04/2018    Anal lesion 03/26/2018    Tonsil cancer ([facility] ) 03/15/2018    Hypothyroidism, adult 01/12/2018    Vitamin D deficiency 01/12/2018    Mild cognitive impairment 12/08/2017    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

## 2021-04-01 NOTE — PROGRESS NOTES
Chief Complaint   Patient presents with    Pre-op Exam     pre op clearance for left total hip replacement on 4/21/2021 by Dr Rasheed Jett at [facility]         Subjective:      Cheyenne Larose is a 79 y o  female who presents to the office today for a preoperative consultation at the request of surgeon Tanner Donahue MD who plans on performing total left hip arthroplasty on April 21  Planned anesthesia is general  The patient has the following known anesthesia issues: NA  Patient has a bleeding risk of: no recent abnormal bleeding  Patient has received dental clearance  She has undergone hyperbaric treatment for osteoradionecrosis of the jaw  This was done through [facility]  Patient may be given Celebrex perioperatively  Review of Systems  Review of Systems   HENT: Dental problem: Did well with the hyperbaric treatment for osteoradionecrosis mandible  Eyes: Negative for visual disturbance  Respiratory: Negative for cough and shortness of breath  Cardiovascular: Negative for chest pain  Genitourinary: Negative for dysuria  Musculoskeletal: Positive for arthralgias and gait problem  Neurological: Negative for syncope and light-headedness  Psychiatric/Behavioral: Positive for dysphoric mood and sleep disturbance  Objective:      Physical Exam      /78   Pulse 80   Temp (!) 96 8 °F (36 °C) (Temporal)   Resp 16   Ht 5' 3" (1 6 m)   Wt 63 7 kg (140 lb 6 4 oz)   SpO2 96%   BMI 24 87 kg/m²     Physical Exam  Constitutional:       General: She is not in acute distress  Appearance: Normal appearance  She is well-developed  Comments: 51-year-old female appears younger than her stated age with a BMI  normala   HENT:      Head: Normocephalic  Eyes:      Conjunctiva/sclera: Conjunctivae normal       Pupils: Pupils are equal, round, and reactive to light  Neck:      Musculoskeletal: Normal range of motion and neck supple     Cardiovascular:      Rate and Rhythm: Normal rate and regular rhythm  Heart sounds: No murmur  Pulmonary:      Effort: Pulmonary effort is normal       Breath sounds: Normal breath sounds  Abdominal:      General: Bowel sounds are normal       Palpations: Abdomen is soft  There is no mass  Tenderness: There is no abdominal tenderness  Musculoskeletal:      Comments:  gait antalgic    Skin:     General: Skin is warm and dry  Neurological:      Mental Status: She is alert and oriented to person, place, and time  Deep Tendon Reflexes: Reflexes are normal and symmetric  Psychiatric:         Mood and Affect: Affect is flat  Behavior: Behavior normal          Thought Content: Thought content normal       Comments:  Tearful at times            Predictors of intubation difficulty:  Morbid obesity? no  Anatomically abnormal facies? no  Short, thick neck? no  Neck range of motion: normal  March 18, tooth pulled #29, #30 Dr Melisa Guy  ECG:   INTERPRETATION:   NORMAL SINUS RHYTHM  POSSIBLE LEFT ATRIAL ENLARGEMENT  LOW LIMB LEAD VOLTAGE  NONSPECIFIC ST AND T WAVE ABNORMALITY  ABNORMAL ECG  WHEN COMPARED WITH ECG OF 19-JAN-2021 15:12,  NO SIGNIFICANT CHANGE WAS FOUND  Confirmed by ANDREW Hawkins MD ([phone]) on 3/30/2021 5:03:03 PM  Imaging  Chest x-ray: Impression:    No acute cardiopulmonary abnormality January 19, 2021    Lab Review   March 30 2021: CBC unremarkable, chemistry with stable creatinine  GFR equal 37  Slightly elevated calcium but improved from 1 month ago  Likely due to secondary hyperparathyroid  Coagulation profile negative   Assessment:      79 y o  female with planned surgery as above  Known risk factors for perioperative complications: The None    Difficulty with intubation is not anticipated  Can walk 2 blocks without difficulty:  Can walk up 2 flights of stairs without difficulty:      1  Preoperative clearance     2  Primary osteoarthritis of left hip     3   Osteoradionecrosis of mandible 4  Fibromyalgia  venlafaxine (EFFEXOR-XR) 37 5 mg 24 hr capsule   5  Generalized anxiety disorder  venlafaxine (EFFEXOR-XR) 37 5 mg 24 hr capsule   6  Moderate episode of recurrent major depressive disorder (HCC)  venlafaxine (EFFEXOR-XR) 37 5 mg 24 hr capsule   7  Stage 3b chronic kidney disease            Plan:         Patient is medically cleared for surgery  Instructed to take her routine medications as soon as stable postoperatively        Diacetylmorphine, Morphine, and Pineapple - food allergy  Past Medical History:   Diagnosis Date    Cancer [facility])     tonsilar cancer followed by radiation and chemo in 2004    Fall     last assessed - [id number]    Impacted cerumen of left ear     last assessed - [id number]    Malignant neoplasm without specification of site [facility]) 06/2004    Phlebitis and thrombophlebitis of deep vessels of lower extremities, unspecified laterality ([facility] )      Past Surgical History:   Procedure Laterality Date    CYSTOSCOPY      Diagnostic    GASTROJEJUNOSTOMY      Percutaneous    LAPAROSCOPIC APPENDECTOMY      OTHER SURGICAL HISTORY      1)Biopsy oropharynx tonsillar fossa; 2)Chemotherapy Administration with chemoport for tonsillar cancer    TONSILLECTOMY      Cancer at age 54   [address]  [facility]      Inferior vena cava     Patient Active Problem List   Diagnosis    Chronic fatigue    Depression    Fibromyalgia    Generalized anxiety disorder    Hyperlipidemia    Hypothyroidism, adult    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[address] )    Mild cognitive impairment    Osteoporosis    Tonsil cancer ([address] )    Vitamin D deficiency    Anal lesion    Major depression, recurrent ([address] )    Stage 3b chronic kidney disease     Social History     Socioeconomic History    Marital status: Single     Spouse name: None    Number of children: None    Years of education: None    Highest education level: None   Occupational History    Occupation: currently working   Social Needs    Financial resource strain: None    Food insecurity     Worry: None     Inability: None    Transportation needs     Medical: None     Non-medical: None   Tobacco Use    Smoking status: Former Smoker     Quit date: 2000     Years since quittin 2    Smokeless tobacco: Never Used   Substance and Sexual Activity    Alcohol use: No    Drug use: No    Sexual activity: None   Lifestyle    Physical activity     Days per week: None     Minutes per session: None    Stress: None   Relationships    Social connections     Talks on phone: None     Gets together: None     Attends [employer] service: None     Active member of club or organization: None     Attends meetings of clubs or organizations: None     Relationship status: None    Intimate partner violence     Fear of current or ex partner: None     Emotionally abused: None     Physically abused: None     Forced sexual activity: None   Other Topics Concern    None   Social History Narrative    None       Current Outpatient Medications:     LORazepam (ATIVAN) 1 mg tablet, Take 1 tablet (1 mg total) by mouth 2 (two) times a day, Disp: 180 tablet, Rfl: 0    mupirocin (BACTROBAN) 2 % ointment, , Disp: , Rfl:     venlafaxine (EFFEXOR-XR) 37 5 mg 24 hr capsule, Take 2 p o   Daily, Disp: 60 capsule, Rfl: 5  Lab Results   Component Value Date    WBC 3 8 09/10/2019    HGB 13 4 09/10/2019    HCT 41 0 09/10/2019    MCV 94 9 09/10/2019     09/10/2019     Lab Results   Component Value Date     2017    K 4 6 2020     2020    CO2 31 2020    ANIONGAP 8 04/10/2015    BUN 19 2020    CREATININE 1 15 (H) 2020    GLUCOSE 120 04/10/2015    GLUF 126 (H) 2018    CALCIUM 10 0 2020    AST 19 2020    ALT 12 2020    ALKPHOS 59 2020    PROT 7 0 2017    BILITOT 0 4 2017    EGFR 52 (L) 2018     No results found for: АНДРЕЙ        [unfilled]    Current Outpatient Medications:     LORazepam (ATIVAN) 1 mg tablet, Take 1 tablet (1 mg total) by mouth 2 (two) times a day, Disp: 180 tablet, Rfl: 0    mupirocin (BACTROBAN) 2 % ointment, , Disp: , Rfl:     venlafaxine (EFFEXOR-XR) 37 5 mg 24 hr capsule, Take 2 p o   Daily, Disp: 60 capsule, Rfl: 5  Allergies   Allergen Reactions    Diacetylmorphine      paranoid    Morphine Other (See Comments)     "confusion & paranoid behavior"    Pineapple - Food Allergy Tongue Swelling     Tongue bleeding

## 2021-04-02 ENCOUNTER — OFFICE VISIT (OUTPATIENT)
Dept: WOUND CARE | Facility: HOSPITAL | Age: 71
End: 2021-04-02
Payer: COMMERCIAL

## 2021-04-02 DIAGNOSIS — M27.2 OSTEORADIONECROSIS OF MANDIBLE: ICD-10-CM
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
## 2021-04-02 PROCEDURE — 99183 HYPERBARIC OXYGEN THERAPY: CPT | Performed by: FAMILY MEDICINE

## 2021-04-02 PROCEDURE — G0277 HBOT, FULL BODY CHAMBER, 30M: HCPCS | Performed by: FAMILY MEDICINE

## 2021-04-02 NOTE — PROGRESS NOTES
HBO Treatment Course Details       Treatment Notes: Pt  Tolerated HBOT today without any complaints  Treatment Course Number: 30  Total Treatments Ordered:  30     Diagnosis:   1  Osteoradionecrosis of mandible  [facility]       [facility] Treatment Details:  Treatment Length:90 Minutes(Minutes)  Chamber #: Hard sided Monoplace Chamber    Pre-Treatment details:  Pre-treatment protocol Treatment Protocol: 2 5 DURAN X 90 minutes w/ 100% oxygen, two 5 minute air breaks  Left ear clear?: yes  Right ear clear?: yes  Left ear intact?: yes  Right ear intact?: yes                    Left ear TEED scale: Grade 0  Right ear TEED scale: Grade 0   Pretreatment heart and lung assessment: Pretreatment heart and lung auscltation unremarkable  Patient cleared for HBOT     Treatment details:  DURAN Rate: 2 5  Started Compression: 1000  Reached Compression: 1010  Total Compression Time: 10 (Minutes)  Total Holding Time: 100 (Minutes)  Started Decompression: 1150  Reached Surface: 1200  Total Decompression Time: 10 (Minutes)  Total Airbreaks: 10 (Minutes)  Total Time of Treatment: 110 (Minutes)  Symptoms Noted During Treatment: None (Minutes)    Post treatment details:  Left ear clear?: yes  Right ear clear?: yes  Left ears intact?: yes  Right ears intact?: yes                    Left ear TEED scale: Grade 0  Right ear TEED scale: Grade 0  Post treatment heart and lung assessment: Post treatment heart and lung auscltation unremarkable  Patient cleared for discharge  Tolerated treatment well         Vital Signs:  HBO Glucose Reference Range: 100-350 mg/dl   Pre-Treatment Post-Treatment   Time vitals are taken: 0945 Time vitals are taken: 1205   Blood Pressure: 98/62 Blood Pressure: 100/60   Pulse: 58 Pulse: 80   Resp: 16 Resp: 16   Temp: 95 7 °F (35 4 °C) Temp: 95 4 °F (35 2 °C)             Allergies   Allergen Reactions    Diacetylmorphine      paranoid    Morphine Other (See Comments)     "confusion & paranoid behavior"    Pineapple - Food Allergy Tongue Swelling     Tongue bleeding     Patient Active Problem List    Diagnosis Date Noted    Major depression, recurrent ([facility] ) 12/04/2018    Anal lesion 03/26/2018    Tonsil cancer ([facility] ) 03/15/2018    Hypothyroidism, adult 01/12/2018    Vitamin D deficiency 01/12/2018    Mild cognitive impairment 12/08/2017    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

## 2021-04-06 PROBLEM — N18.32 STAGE 3B CHRONIC KIDNEY DISEASE (HCC): Status: ACTIVE | Noted: 2021-04-06

## 2021-04-19 DIAGNOSIS — F41.1 GENERALIZED ANXIETY DISORDER: ICD-10-CM

## 2021-04-19 NOTE — TELEPHONE ENCOUNTER
Patient is asking if we can please send in Lorazepam to [facility] in [place]  Patient uses medication 1MG it 3x a day  Thank you

## 2021-04-20 RX ORDER — LORAZEPAM 1 MG/1
1 TABLET ORAL 3 TIMES DAILY
Qty: 270 TABLET | Refills: 0 | Status: SHIPPED | OUTPATIENT
Start: 2021-04-20 | End: 2021-11-01 | Stop reason: SDUPTHER

## 2021-04-21 ENCOUNTER — HOSPITAL ENCOUNTER (EMERGENCY)
Facility: HOSPITAL | Age: 71
Discharge: HOME | End: 2021-04-21
Attending: EMERGENCY MEDICINE
Payer: COMMERCIAL

## 2021-04-21 ENCOUNTER — APPOINTMENT (EMERGENCY)
Dept: CT IMAGING | Facility: HOSPITAL | Age: 71
End: 2021-04-21
Payer: COMMERCIAL

## 2021-04-21 ENCOUNTER — APPOINTMENT (EMERGENCY)
Dept: RADIOLOGY | Facility: HOSPITAL | Age: 71
End: 2021-04-21
Payer: COMMERCIAL

## 2021-04-21 VITALS
SYSTOLIC BLOOD PRESSURE: 148 MMHG | OXYGEN SATURATION: 99 % | TEMPERATURE: 98.2 F | HEART RATE: 65 BPM | DIASTOLIC BLOOD PRESSURE: 85 MMHG | RESPIRATION RATE: 18 BRPM

## 2021-04-21 DIAGNOSIS — M16.12 ARTHRITIS OF LEFT HIP: ICD-10-CM

## 2021-04-21 DIAGNOSIS — R93.89 ABNORMAL CT SCAN: ICD-10-CM

## 2021-04-21 DIAGNOSIS — S09.90XA CLOSED HEAD INJURY, INITIAL ENCOUNTER: ICD-10-CM

## 2021-04-21 DIAGNOSIS — S16.1XXA STRAIN OF NECK MUSCLE, INITIAL ENCOUNTER: ICD-10-CM

## 2021-04-21 DIAGNOSIS — S80.01XA CONTUSION OF RIGHT KNEE, INITIAL ENCOUNTER: ICD-10-CM
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
## 2021-04-21 PROCEDURE — 73564 X-RAY EXAM KNEE 4 OR MORE: CPT

## 2021-04-21 PROCEDURE — 73502 X-RAY EXAM HIP UNI 2-3 VIEWS: CPT

## 2021-04-21 PROCEDURE — 70450 CT HEAD/BRAIN W/O DYE: CPT

## 2021-04-21 PROCEDURE — 99284 EMERGENCY DEPT VISIT MOD MDM: CPT

## 2021-04-21 PROCEDURE — 72125 CT NECK SPINE W/O DYE: CPT

## 2021-04-21 PROCEDURE — 99282 EMERGENCY DEPT VISIT SF MDM: CPT | Performed by: PHYSICIAN ASSISTANT

## 2021-04-21 RX ORDER — ACETAMINOPHEN 325 MG/1
650 TABLET ORAL ONCE
Status: DISCONTINUED | OUTPATIENT
Start: 2021-04-21 | End: 2021-04-21 | Stop reason: HOSPADM
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
## 2021-05-11 ENCOUNTER — TELEPHONE (OUTPATIENT)
Dept: FAMILY MEDICINE CLINIC | Facility: CLINIC | Age: 71
End: 2021-05-11

## 2021-06-08 ENCOUNTER — APPOINTMENT (EMERGENCY)
Dept: CT IMAGING | Facility: HOSPITAL | Age: 71
DRG: 641 | End: 2021-06-08
Payer: COMMERCIAL

## 2021-06-08 ENCOUNTER — APPOINTMENT (EMERGENCY)
Dept: RADIOLOGY | Facility: HOSPITAL | Age: 71
DRG: 641 | End: 2021-06-08
Payer: COMMERCIAL

## 2021-06-08 ENCOUNTER — HOSPITAL ENCOUNTER (INPATIENT)
Facility: HOSPITAL | Age: 71
LOS: 2 days | Discharge: HOME | DRG: 641 | End: 2021-06-10
Attending: EMERGENCY MEDICINE | Admitting: INTERNAL MEDICINE
Payer: COMMERCIAL

## 2021-06-08 DIAGNOSIS — R55 SYNCOPE: Primary | ICD-10-CM

## 2021-06-08 DIAGNOSIS — I95.1 ORTHOSTATIC HYPOTENSION: ICD-10-CM

## 2021-06-08 DIAGNOSIS — N17.9 AKI (ACUTE KIDNEY INJURY) (HCC): ICD-10-CM

## 2021-06-08 PROBLEM — I95.9 HYPOTENSION: Status: ACTIVE | Noted: 2021-06-08

## 2021-06-08 LAB
ALBUMIN SERPL BCP-MCNC: 3.6 G/DL (ref 3.5–5)
ALP SERPL-CCNC: 80 U/L (ref 46–116)
ALT SERPL W P-5'-P-CCNC: 28 U/L (ref 12–78)
ANION GAP SERPL CALCULATED.3IONS-SCNC: 11 MMOL/L (ref 4–13)
AST SERPL W P-5'-P-CCNC: 26 U/L (ref 5–45)
ATRIAL RATE: 74 BPM
BACTERIA UR QL AUTO: ABNORMAL /HPF
BASOPHILS # BLD AUTO: 0.03 THOUSANDS/ΜL (ref 0–0.1)
BASOPHILS NFR BLD AUTO: 1 % (ref 0–1)
BILIRUB SERPL-MCNC: 0.51 MG/DL (ref 0.2–1)
BILIRUB UR QL STRIP: ABNORMAL
BUN SERPL-MCNC: 30 MG/DL (ref 5–25)
CALCIUM SERPL-MCNC: 10.3 MG/DL (ref 8.3–10.1)
CAOX CRY URNS QL MICRO: ABNORMAL /HPF
CHLORIDE SERPL-SCNC: 101 MMOL/L (ref 100–108)
CLARITY UR: CLEAR
CO2 SERPL-SCNC: 30 MMOL/L (ref 21–32)
COLOR UR: YELLOW
CREAT SERPL-MCNC: 1.78 MG/DL (ref 0.6–1.3)
EOSINOPHIL # BLD AUTO: 0.03 THOUSAND/ΜL (ref 0–0.61)
EOSINOPHIL NFR BLD AUTO: 1 % (ref 0–6)
ERYTHROCYTE [DISTWIDTH] IN BLOOD BY AUTOMATED COUNT: 12.8 % (ref 11.6–15.1)
GFR SERPL CREATININE-BSD FRML MDRD: 28 ML/MIN/1.73SQ M
GLUCOSE SERPL-MCNC: 113 MG/DL (ref 65–140)
GLUCOSE UR STRIP-MCNC: NEGATIVE MG/DL
HCT VFR BLD AUTO: 38.6 % (ref 34.8–46.1)
HGB BLD-MCNC: 12.3 G/DL (ref 11.5–15.4)
HGB UR QL STRIP.AUTO: ABNORMAL
HYALINE CASTS #/AREA URNS LPF: ABNORMAL /LPF
IMM GRANULOCYTES # BLD AUTO: 0.02 THOUSAND/UL (ref 0–0.2)
IMM GRANULOCYTES NFR BLD AUTO: 0 % (ref 0–2)
KETONES UR STRIP-MCNC: ABNORMAL MG/DL
LEUKOCYTE ESTERASE UR QL STRIP: NEGATIVE
LIPASE SERPL-CCNC: 166 U/L (ref 73–393)
LYMPHOCYTES # BLD AUTO: 0.45 THOUSANDS/ΜL (ref 0.6–4.47)
LYMPHOCYTES NFR BLD AUTO: 8 % (ref 14–44)
MCH RBC QN AUTO: 30.8 PG (ref 26.8–34.3)
MCHC RBC AUTO-ENTMCNC: 31.9 G/DL (ref 31.4–37.4)
MCV RBC AUTO: 97 FL (ref 82–98)
MONOCYTES # BLD AUTO: 0.43 THOUSAND/ΜL (ref 0.17–1.22)
MONOCYTES NFR BLD AUTO: 8 % (ref 4–12)
NEUTROPHILS # BLD AUTO: 4.72 THOUSANDS/ΜL (ref 1.85–7.62)
NEUTS SEG NFR BLD AUTO: 82 % (ref 43–75)
NITRITE UR QL STRIP: NEGATIVE
NON-SQ EPI CELLS URNS QL MICRO: ABNORMAL /HPF
NRBC BLD AUTO-RTO: 0 /100 WBCS
P AXIS: 50 DEGREES
PH UR STRIP.AUTO: 5.5 [PH] (ref 4.5–8)
PLATELET # BLD AUTO: 280 THOUSANDS/UL (ref 149–390)
PMV BLD AUTO: 8.8 FL (ref 8.9–12.7)
POTASSIUM SERPL-SCNC: 4.4 MMOL/L (ref 3.5–5.3)
PR INTERVAL: 174 MS
PROT SERPL-MCNC: 7.3 G/DL (ref 6.4–8.2)
PROT UR STRIP-MCNC: ABNORMAL MG/DL
QRS AXIS: 79 DEGREES
QRSD INTERVAL: 86 MS
QT INTERVAL: 364 MS
QTC INTERVAL: 404 MS
RBC # BLD AUTO: 4 MILLION/UL (ref 3.81–5.12)
RBC #/AREA URNS AUTO: ABNORMAL /HPF
SODIUM SERPL-SCNC: 142 MMOL/L (ref 136–145)
SP GR UR STRIP.AUTO: >=1.03 (ref 1–1.03)
T WAVE AXIS: 44 DEGREES
TROPONIN I SERPL-MCNC: <0.02 NG/ML
TSH SERPL DL<=0.05 MIU/L-ACNC: 1.99 UIU/ML (ref 0.36–3.74)
UROBILINOGEN UR QL STRIP.AUTO: 0.2 E.U./DL
VENTRICULAR RATE: 74 BPM
WBC # BLD AUTO: 5.68 THOUSAND/UL (ref 4.31–10.16)
WBC #/AREA URNS AUTO: ABNORMAL /HPF

## 2021-06-08 PROCEDURE — 81001 URINALYSIS AUTO W/SCOPE: CPT

## 2021-06-08 PROCEDURE — 80053 COMPREHEN METABOLIC PANEL: CPT | Performed by: PHYSICIAN ASSISTANT

## 2021-06-08 PROCEDURE — 85025 COMPLETE CBC W/AUTO DIFF WBC: CPT | Performed by: PHYSICIAN ASSISTANT

## 2021-06-08 PROCEDURE — 99285 EMERGENCY DEPT VISIT HI MDM: CPT | Performed by: PHYSICIAN ASSISTANT

## 2021-06-08 PROCEDURE — 70450 CT HEAD/BRAIN W/O DYE: CPT

## 2021-06-08 PROCEDURE — 93005 ELECTROCARDIOGRAM TRACING: CPT

## 2021-06-08 PROCEDURE — 83690 ASSAY OF LIPASE: CPT | Performed by: PHYSICIAN ASSISTANT

## 2021-06-08 PROCEDURE — 99285 EMERGENCY DEPT VISIT HI MDM: CPT

## 2021-06-08 PROCEDURE — 71046 X-RAY EXAM CHEST 2 VIEWS: CPT

## 2021-06-08 PROCEDURE — 99223 1ST HOSP IP/OBS HIGH 75: CPT | Performed by: PHYSICIAN ASSISTANT

## 2021-06-08 PROCEDURE — 72125 CT NECK SPINE W/O DYE: CPT

## 2021-06-08 PROCEDURE — 84443 ASSAY THYROID STIM HORMONE: CPT | Performed by: PHYSICIAN ASSISTANT

## 2021-06-08 PROCEDURE — 84484 ASSAY OF TROPONIN QUANT: CPT | Performed by: PHYSICIAN ASSISTANT

## 2021-06-08 PROCEDURE — 93010 ELECTROCARDIOGRAM REPORT: CPT | Performed by: INTERNAL MEDICINE

## 2021-06-08 PROCEDURE — 36415 COLL VENOUS BLD VENIPUNCTURE: CPT | Performed by: PHYSICIAN ASSISTANT

## 2021-06-08 RX ORDER — ACETAMINOPHEN 325 MG/1
650 TABLET ORAL EVERY 6 HOURS PRN
Status: DISCONTINUED | OUTPATIENT
Start: 2021-06-08 | End: 2021-06-10 | Stop reason: HOSPADM

## 2021-06-08 RX ORDER — VENLAFAXINE HYDROCHLORIDE 75 MG/1
75 CAPSULE, EXTENDED RELEASE ORAL DAILY
Status: DISCONTINUED | OUTPATIENT
Start: 2021-06-09 | End: 2021-06-10 | Stop reason: HOSPADM

## 2021-06-08 RX ORDER — ONDANSETRON 2 MG/ML
4 INJECTION INTRAMUSCULAR; INTRAVENOUS EVERY 6 HOURS PRN
Status: DISCONTINUED | OUTPATIENT
Start: 2021-06-08 | End: 2021-06-10 | Stop reason: HOSPADM

## 2021-06-08 RX ORDER — LORAZEPAM 1 MG/1
1 TABLET ORAL EVERY 6 HOURS PRN
Status: DISCONTINUED | OUTPATIENT
Start: 2021-06-08 | End: 2021-06-10 | Stop reason: HOSPADM

## 2021-06-08 RX ORDER — HEPARIN SODIUM 5000 [USP'U]/ML
5000 INJECTION, SOLUTION INTRAVENOUS; SUBCUTANEOUS EVERY 8 HOURS SCHEDULED
Status: DISCONTINUED | OUTPATIENT
Start: 2021-06-08 | End: 2021-06-10 | Stop reason: HOSPADM

## 2021-06-08 RX ORDER — SODIUM CHLORIDE 9 MG/ML
125 INJECTION, SOLUTION INTRAVENOUS CONTINUOUS
Status: DISCONTINUED | OUTPATIENT
Start: 2021-06-08 | End: 2021-06-09

## 2021-06-08 RX ADMIN — SODIUM CHLORIDE 1000 ML: 0.9 INJECTION, SOLUTION INTRAVENOUS at 16:26

## 2021-06-08 RX ADMIN — SODIUM CHLORIDE 125 ML/HR: 0.9 INJECTION, SOLUTION INTRAVENOUS at 17:52

## 2021-06-08 NOTE — PLAN OF CARE
Problem: Potential for Falls  Goal: Patient will remain free of falls  Description: INTERVENTIONS:  - Assess patient frequently for physical needs  -  Identify cognitive and physical deficits and behaviors that affect risk of falls  -  [place] fall precautions as indicated by assessment   - Educate patient/family on patient safety including physical limitations  - Instruct patient to call for assistance with activity based on assessment  - Modify environment to reduce risk of injury  - Consider OT/PT consult to assist with strengthening/mobility  Outcome: Progressing     Problem: PAIN - ADULT  Goal: Verbalizes/displays adequate comfort level or baseline comfort level  Description: Interventions:  - Encourage patient to monitor pain and request assistance  - Assess pain using appropriate pain scale  - Administer analgesics based on type and severity of pain and evaluate response  - Implement non-pharmacological measures as appropriate and evaluate response  - Consider cultural and social influences on pain and pain management  - Notify physician/advanced practitioner if interventions unsuccessful or patient reports new pain  Outcome: Progressing     Problem: SAFETY ADULT  Goal: Patient will remain free of falls  Description: INTERVENTIONS:  - Assess patient frequently for physical needs  -  Identify cognitive and physical deficits and behaviors that affect risk of falls    -  [place] fall precautions as indicated by assessment   - Educate patient/family on patient safety including physical limitations  - Instruct patient to call for assistance with activity based on assessment  - Modify environment to reduce risk of injury  - Consider OT/PT consult to assist with strengthening/mobility  Outcome: Progressing  Goal: Maintain or return to baseline ADL function  Description: INTERVENTIONS:  -  Assess patient's ability to carry out ADLs; assess patient's baseline for ADL function and identify physical deficits which impact ability to perform ADLs (bathing, care of mouth/teeth, toileting, grooming, dressing, etc )  - Assess/evaluate cause of self-care deficits   - Assess range of motion  - Assess patient's mobility; develop plan if impaired  - Assess patient's need for assistive devices and provide as appropriate  - Encourage maximum independence but intervene and supervise when necessary  - Involve family in performance of ADLs  - Assess for home care needs following discharge   - Consider OT consult to assist with ADL evaluation and planning for discharge  - Provide patient education as appropriate  Outcome: Progressing  Goal: Maintain or return mobility status to optimal level  Description: INTERVENTIONS:  - Assess patient's baseline mobility status (ambulation, transfers, stairs, etc )    - Identify cognitive and physical deficits and behaviors that affect mobility  - Identify mobility aids required to assist with transfers and/or ambulation (gait belt, sit-to-stand, lift, walker, cane, etc )  - [place] fall precautions as indicated by assessment  - Record patient progress and toleration of activity level on Mobility SBAR; progress patient to next Phase/Stage  - Instruct patient to call for assistance with activity based on assessment  - Consider rehabilitation consult to assist with strengthening/weightbearing, etc   Outcome: Progressing     Problem: DISCHARGE PLANNING  Goal: Discharge to home or other facility with appropriate resources  Description: INTERVENTIONS:  - Identify barriers to discharge w/patient and caregiver  - Arrange for needed discharge resources and transportation as appropriate  - Identify discharge learning needs (meds, wound care, etc )  - Arrange for interpretive services to assist at discharge as needed  - Refer to Case Management Department for coordinating discharge planning if the patient needs post-hospital services based on physician/advanced practitioner order or complex needs related to functional status, cognitive ability, or social support system  Outcome: Progressing     Problem: Knowledge Deficit  Goal: Patient/family/caregiver demonstrates understanding of disease process, treatment plan, medications, and discharge instructions  Description: Complete learning assessment and assess knowledge base    Interventions:  - Provide teaching at level of understanding  - Provide teaching via preferred learning methods  Outcome: Progressing     Problem: MUSCULOSKELETAL - ADULT  Goal: Maintain or return mobility to safest level of function  Description: INTERVENTIONS:  - Assess patient's ability to carry out ADLs; assess patient's baseline for ADL function and identify physical deficits which impact ability to perform ADLs (bathing, care of mouth/teeth, toileting, grooming, dressing, etc )  - Assess/evaluate cause of self-care deficits   - Assess range of motion  - Assess patient's mobility  - Assess patient's need for assistive devices and provide as appropriate  - Encourage maximum independence but intervene and supervise when necessary  - Involve family in performance of ADLs  - Assess for home care needs following discharge   - Consider OT consult to assist with ADL evaluation and planning for discharge  - Provide patient education as appropriate  Outcome: Progressing  Goal: Maintain proper alignment of affected body part  Description: INTERVENTIONS:  - Support, maintain and protect limb and body alignment  - Provide patient/ family with appropriate education  Outcome: Progressing
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
## 2021-06-08 NOTE — ASSESSMENT & PLAN NOTE
Ativan prn   PDMP reviewed on admission   Effexor per home regimen   Patient is very tearful during exam, has a stressful home situation  Lives alone without [facility]   Consult to CM placed on admission

## 2021-06-08 NOTE — ASSESSMENT & PLAN NOTE
POA with VLAD superimposed on stage 3 CKD   Cr 1 78 on admission   Patient did not eat all morning and lives in house without [facility]   Suspect prerenal in setting of dehydration   Provide with IVF hydration   avoid hypotension/nephrotoxins   Check PVR and monitor for retention
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
## 2021-06-08 NOTE — ASSESSMENT & PLAN NOTE
POA with syncope while shopping at [facility], patient did not eat all day and then felt lightheaded while shopping and fell to the ground at the register but did not hit her head   Of note, she lives alone in a house without [facility] with concerns for dehydration, POA with VLAD and + orthostatic VS in ED with drop BP from 110/61 to 78/52 upon standing   CT head negative and CT cervical spine negative for acute traumatic abnormalities   Provide with IVF hydration   Compression stockings   Change positions slowly   Troponin negative   Monitor on telemetry to make sure no high grade blocks   Continue to monitor orthostatic vital signs qshift   Check BMP in AM   Consult PT/OT/CM

## 2021-06-08 NOTE — PLAN OF CARE
Problem: Potential for Falls  Goal: Patient will remain free of falls  Description: INTERVENTIONS:  - Assess patient frequently for physical needs  -  Identify cognitive and physical deficits and behaviors that affect risk of falls  -  [place] fall precautions as indicated by assessment   - Educate patient/family on patient safety including physical limitations  - Instruct patient to call for assistance with activity based on assessment  - Modify environment to reduce risk of injury  - Consider OT/PT consult to assist with strengthening/mobility  Outcome: Progressing     Problem: PAIN - ADULT  Goal: Verbalizes/displays adequate comfort level or baseline comfort level  Description: Interventions:  - Encourage patient to monitor pain and request assistance  - Assess pain using appropriate pain scale  - Administer analgesics based on type and severity of pain and evaluate response  - Implement non-pharmacological measures as appropriate and evaluate response  - Consider cultural and social influences on pain and pain management  - Notify physician/advanced practitioner if interventions unsuccessful or patient reports new pain  Outcome: Progressing     Problem: SAFETY ADULT  Goal: Patient will remain free of falls  Description: INTERVENTIONS:  - Assess patient frequently for physical needs  -  Identify cognitive and physical deficits and behaviors that affect risk of falls    -  [place] fall precautions as indicated by assessment   - Educate patient/family on patient safety including physical limitations  - Instruct patient to call for assistance with activity based on assessment  - Modify environment to reduce risk of injury  - Consider OT/PT consult to assist with strengthening/mobility  Outcome: Progressing  Goal: Maintain or return to baseline ADL function  Description: INTERVENTIONS:  -  Assess patient's ability to carry out ADLs; assess patient's baseline for ADL function and identify physical deficits which impact ability to perform ADLs (bathing, care of mouth/teeth, toileting, grooming, dressing, etc )  - Assess/evaluate cause of self-care deficits   - Assess range of motion  - Assess patient's mobility; develop plan if impaired  - Assess patient's need for assistive devices and provide as appropriate  - Encourage maximum independence but intervene and supervise when necessary  - Involve family in performance of ADLs  - Assess for home care needs following discharge   - Consider OT consult to assist with ADL evaluation and planning for discharge  - Provide patient education as appropriate  Outcome: Progressing  Goal: Maintain or return mobility status to optimal level  Description: INTERVENTIONS:  - Assess patient's baseline mobility status (ambulation, transfers, stairs, etc )    - Identify cognitive and physical deficits and behaviors that affect mobility  - Identify mobility aids required to assist with transfers and/or ambulation (gait belt, sit-to-stand, lift, walker, cane, etc )  - [place] fall precautions as indicated by assessment  - Record patient progress and toleration of activity level on Mobility SBAR; progress patient to next Phase/Stage  - Instruct patient to call for assistance with activity based on assessment  - Consider rehabilitation consult to assist with strengthening/weightbearing, etc   Outcome: Progressing     Problem: DISCHARGE PLANNING  Goal: Discharge to home or other facility with appropriate resources  Description: INTERVENTIONS:  - Identify barriers to discharge w/patient and caregiver  - Arrange for needed discharge resources and transportation as appropriate  - Identify discharge learning needs (meds, wound care, etc )  - Arrange for interpretive services to assist at discharge as needed  - Refer to Case Management Department for coordinating discharge planning if the patient needs post-hospital services based on physician/advanced practitioner order or complex needs related to functional status, cognitive ability, or social support system  Outcome: Progressing     Problem: Knowledge Deficit  Goal: Patient/family/caregiver demonstrates understanding of disease process, treatment plan, medications, and discharge instructions  Description: Complete learning assessment and assess knowledge base    Interventions:  - Provide teaching at level of understanding  - Provide teaching via preferred learning methods  Outcome: Progressing     Problem: MUSCULOSKELETAL - ADULT  Goal: Maintain or return mobility to safest level of function  Description: INTERVENTIONS:  - Assess patient's ability to carry out ADLs; assess patient's baseline for ADL function and identify physical deficits which impact ability to perform ADLs (bathing, care of mouth/teeth, toileting, grooming, dressing, etc )  - Assess/evaluate cause of self-care deficits   - Assess range of motion  - Assess patient's mobility  - Assess patient's need for assistive devices and provide as appropriate  - Encourage maximum independence but intervene and supervise when necessary  - Involve family in performance of ADLs  - Assess for home care needs following discharge   - Consider OT consult to assist with ADL evaluation and planning for discharge  - Provide patient education as appropriate  Outcome: Progressing  Goal: Maintain proper alignment of affected body part  Description: INTERVENTIONS:  - Support, maintain and protect limb and body alignment  - Provide patient/ family with appropriate education  Outcome: Progressing

## 2021-06-09 PROBLEM — I95.1 ORTHOSTATIC HYPOTENSION: Status: ACTIVE | Noted: 2021-06-08

## 2021-06-09 LAB
ANION GAP SERPL CALCULATED.3IONS-SCNC: 7 MMOL/L (ref 4–13)
ATRIAL RATE: 69 BPM
BASOPHILS # BLD AUTO: 0.03 THOUSANDS/ΜL (ref 0–0.1)
BASOPHILS NFR BLD AUTO: 1 % (ref 0–1)
BUN SERPL-MCNC: 27 MG/DL (ref 5–25)
CALCIUM SERPL-MCNC: 8.7 MG/DL (ref 8.3–10.1)
CHLORIDE SERPL-SCNC: 107 MMOL/L (ref 100–108)
CO2 SERPL-SCNC: 30 MMOL/L (ref 21–32)
CREAT SERPL-MCNC: 1.18 MG/DL (ref 0.6–1.3)
EOSINOPHIL # BLD AUTO: 0.1 THOUSAND/ΜL (ref 0–0.61)
EOSINOPHIL NFR BLD AUTO: 2 % (ref 0–6)
ERYTHROCYTE [DISTWIDTH] IN BLOOD BY AUTOMATED COUNT: 12.9 % (ref 11.6–15.1)
GFR SERPL CREATININE-BSD FRML MDRD: 47 ML/MIN/1.73SQ M
GLUCOSE SERPL-MCNC: 91 MG/DL (ref 65–140)
HCT VFR BLD AUTO: 31.5 % (ref 34.8–46.1)
HGB BLD-MCNC: 9.9 G/DL (ref 11.5–15.4)
IMM GRANULOCYTES # BLD AUTO: 0.01 THOUSAND/UL (ref 0–0.2)
IMM GRANULOCYTES NFR BLD AUTO: 0 % (ref 0–2)
LYMPHOCYTES # BLD AUTO: 1.04 THOUSANDS/ΜL (ref 0.6–4.47)
LYMPHOCYTES NFR BLD AUTO: 20 % (ref 14–44)
MCH RBC QN AUTO: 29.8 PG (ref 26.8–34.3)
MCHC RBC AUTO-ENTMCNC: 31.4 G/DL (ref 31.4–37.4)
MCV RBC AUTO: 95 FL (ref 82–98)
MONOCYTES # BLD AUTO: 0.61 THOUSAND/ΜL (ref 0.17–1.22)
MONOCYTES NFR BLD AUTO: 12 % (ref 4–12)
NEUTROPHILS # BLD AUTO: 3.36 THOUSANDS/ΜL (ref 1.85–7.62)
NEUTS SEG NFR BLD AUTO: 65 % (ref 43–75)
NRBC BLD AUTO-RTO: 0 /100 WBCS
P AXIS: 25 DEGREES
PLATELET # BLD AUTO: 225 THOUSANDS/UL (ref 149–390)
PMV BLD AUTO: 8.7 FL (ref 8.9–12.7)
POTASSIUM SERPL-SCNC: 3.6 MMOL/L (ref 3.5–5.3)
PR INTERVAL: 190 MS
QRS AXIS: 56 DEGREES
QRSD INTERVAL: 92 MS
QT INTERVAL: 404 MS
QTC INTERVAL: 432 MS
RBC # BLD AUTO: 3.32 MILLION/UL (ref 3.81–5.12)
SODIUM SERPL-SCNC: 144 MMOL/L (ref 136–145)
T WAVE AXIS: 23 DEGREES
VENTRICULAR RATE: 69 BPM
WBC # BLD AUTO: 5.15 THOUSAND/UL (ref 4.31–10.16)

## 2021-06-09 PROCEDURE — 99232 SBSQ HOSP IP/OBS MODERATE 35: CPT | Performed by: INTERNAL MEDICINE

## 2021-06-09 PROCEDURE — 85025 COMPLETE CBC W/AUTO DIFF WBC: CPT | Performed by: PHYSICIAN ASSISTANT

## 2021-06-09 PROCEDURE — 80048 BASIC METABOLIC PNL TOTAL CA: CPT | Performed by: PHYSICIAN ASSISTANT

## 2021-06-09 PROCEDURE — 97163 PT EVAL HIGH COMPLEX 45 MIN: CPT

## 2021-06-09 PROCEDURE — 93010 ELECTROCARDIOGRAM REPORT: CPT | Performed by: INTERNAL MEDICINE

## 2021-06-09 PROCEDURE — 93005 ELECTROCARDIOGRAM TRACING: CPT

## 2021-06-09 RX ORDER — GINSENG 100 MG
1 CAPSULE ORAL 2 TIMES DAILY
Status: DISCONTINUED | OUTPATIENT
Start: 2021-06-09 | End: 2021-06-10 | Stop reason: HOSPADM

## 2021-06-09 RX ADMIN — LORAZEPAM 1 MG: 1 TABLET ORAL at 09:15

## 2021-06-09 RX ADMIN — VENLAFAXINE HYDROCHLORIDE 75 MG: 75 CAPSULE, EXTENDED RELEASE ORAL at 08:49

## 2021-06-09 RX ADMIN — Medication 1 SMALL APPLICATION: at 18:29

## 2021-06-09 NOTE — PLAN OF CARE
Problem: Potential for Falls  Goal: Patient will remain free of falls  Description: INTERVENTIONS:  - Assess patient frequently for physical needs  -  Identify cognitive and physical deficits and behaviors that affect risk of falls  -  [place] fall precautions as indicated by assessment   - Educate patient/family on patient safety including physical limitations  - Instruct patient to call for assistance with activity based on assessment  - Modify environment to reduce risk of injury  - Consider OT/PT consult to assist with strengthening/mobility  6/9/2021 0940 by Jose G Sykes RN  Outcome: Progressing  6/9/2021 0939 by Jose G Sykes RN  Outcome: Progressing     Problem: PAIN - ADULT  Goal: Verbalizes/displays adequate comfort level or baseline comfort level  Description: Interventions:  - Encourage patient to monitor pain and request assistance  - Assess pain using appropriate pain scale  - Administer analgesics based on type and severity of pain and evaluate response  - Implement non-pharmacological measures as appropriate and evaluate response  - Consider cultural and social influences on pain and pain management  - Notify physician/advanced practitioner if interventions unsuccessful or patient reports new pain  6/9/2021 0940 by Jose G Sykes RN  Outcome: Progressing  6/9/2021 0939 by Jose G Sykes RN  Outcome: Progressing     Problem: SAFETY ADULT  Goal: Patient will remain free of falls  Description: INTERVENTIONS:  - Assess patient frequently for physical needs  -  Identify cognitive and physical deficits and behaviors that affect risk of falls    -  [place] fall precautions as indicated by assessment   - Educate patient/family on patient safety including physical limitations  - Instruct patient to call for assistance with activity based on assessment  - Modify environment to reduce risk of injury  - Consider OT/PT consult to assist with strengthening/mobility  6/9/2021 0940 by Jose G Sykes RN  Outcome: Progressing  6/9/2021 0939 by Adina Patricio RN  Outcome: Progressing  Goal: Maintain or return to baseline ADL function  Description: INTERVENTIONS:  -  Assess patient's ability to carry out ADLs; assess patient's baseline for ADL function and identify physical deficits which impact ability to perform ADLs (bathing, care of mouth/teeth, toileting, grooming, dressing, etc )  - Assess/evaluate cause of self-care deficits   - Assess range of motion  - Assess patient's mobility; develop plan if impaired  - Assess patient's need for assistive devices and provide as appropriate  - Encourage maximum independence but intervene and supervise when necessary  - Involve family in performance of ADLs  - Assess for home care needs following discharge   - Consider OT consult to assist with ADL evaluation and planning for discharge  - Provide patient education as appropriate  6/9/2021 0940 by Adina Patricio RN  Outcome: Progressing  6/9/2021 0939 by Adina Patricio RN  Outcome: Progressing  Goal: Maintain or return mobility status to optimal level  Description: INTERVENTIONS:  - Assess patient's baseline mobility status (ambulation, transfers, stairs, etc )    - Identify cognitive and physical deficits and behaviors that affect mobility  - Identify mobility aids required to assist with transfers and/or ambulation (gait belt, sit-to-stand, lift, walker, cane, etc )  - [place] fall precautions as indicated by assessment  - Record patient progress and toleration of activity level on Mobility SBAR; progress patient to next Phase/Stage  - Instruct patient to call for assistance with activity based on assessment  - Consider rehabilitation consult to assist with strengthening/weightbearing, etc   6/9/2021 0940 by Adina Patricio RN  Outcome: Progressing  6/9/2021 0939 by Adina Patricio RN  Outcome: Progressing     Problem: DISCHARGE PLANNING  Goal: Discharge to home or other facility with appropriate resources  Description: INTERVENTIONS:  - Identify barriers to discharge w/patient and caregiver  - Arrange for needed discharge resources and transportation as appropriate  - Identify discharge learning needs (meds, wound care, etc )  - Arrange for interpretive services to assist at discharge as needed  - Refer to Case Management Department for coordinating discharge planning if the patient needs post-hospital services based on physician/advanced practitioner order or complex needs related to functional status, cognitive ability, or social support system  6/9/2021 0940 by Maria D Banda RN  Outcome: Progressing  6/9/2021 0939 by Maria D Banda RN  Outcome: Progressing     Problem: Knowledge Deficit  Goal: Patient/family/caregiver demonstrates understanding of disease process, treatment plan, medications, and discharge instructions  Description: Complete learning assessment and assess knowledge base    Interventions:  - Provide teaching at level of understanding  - Provide teaching via preferred learning methods  6/9/2021 0940 by Maria D Banda RN  Outcome: Progressing  6/9/2021 0939 by Maria D Banda RN  Outcome: Progressing     Problem: MUSCULOSKELETAL - ADULT  Goal: Maintain or return mobility to safest level of function  Description: INTERVENTIONS:  - Assess patient's ability to carry out ADLs; assess patient's baseline for ADL function and identify physical deficits which impact ability to perform ADLs (bathing, care of mouth/teeth, toileting, grooming, dressing, etc )  - Assess/evaluate cause of self-care deficits   - Assess range of motion  - Assess patient's mobility  - Assess patient's need for assistive devices and provide as appropriate  - Encourage maximum independence but intervene and supervise when necessary  - Involve family in performance of ADLs  - Assess for home care needs following discharge   - Consider OT consult to assist with ADL evaluation and planning for discharge  - Provide patient education as appropriate  6/9/2021 0940 by Maria D Banda RN  Outcome: Progressing  6/9/2021 0939 by Davis Carrel, RN  Outcome: Progressing  Goal: Maintain proper alignment of affected body part  Description: INTERVENTIONS:  - Support, maintain and protect limb and body alignment  - Provide patient/ family with appropriate education  6/9/2021 0940 by Davis Carrel, RN  Outcome: Progressing  6/9/2021 0939 by Davis Carrel, RN  Outcome: Progressing     Problem: CARDIOVASCULAR - ADULT  Goal: Maintains optimal cardiac output and hemodynamic stability  Description: INTERVENTIONS:  - Monitor I/O, vital signs and rhythm  - Monitor for S/S and trends of decreased cardiac output  - Administer and titrate ordered vasoactive medications to optimize hemodynamic stability  - Assess quality of pulses, skin color and temperature  - Assess for signs of decreased coronary artery perfusion  - Instruct patient to report change in severity of symptoms  Outcome: Progressing  Goal: Absence of cardiac dysrhythmias or at baseline rhythm  Description: INTERVENTIONS:  - Continuous cardiac monitoring, vital signs, obtain 12 lead EKG if ordered  - Administer antiarrhythmic and heart rate control medications as ordered  - Monitor electrolytes and administer replacement therapy as ordered  Outcome: Progressing
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
## 2021-06-09 NOTE — ASSESSMENT & PLAN NOTE
POA with syncope while shopping at [facility], patient did not eat all day and then felt lightheaded while shopping and fell to the ground at the register but did not hit her head  Of note, she lives alone in a house without [facility]   Due to dehydration and orthostatic hypotension  Physical therapy cleared pt for discharge

## 2021-06-09 NOTE — CASE MANAGEMENT
MD stated pt might be discharge today  Rec a consult stating no Air Conditioner  Pt came in with syncope  Left a message with [facility] at [phone] asking if they have any fans or air conditioners they are giving seniors

## 2021-06-09 NOTE — PROGRESS NOTES
[address] [place] [address]  Progress Note - Lesley Card 1950, 79 y o  female MRN: [id number]  Unit/Bed#: E4 -01 Encounter: [id number]  Primary Care Provider: Majo Figueroa DO   Date and time admitted to hospital: 6/8/2021 12:10 PM    Orthostatic hypotension  Assessment & Plan  Due to dehydration  Resolved  Will start teds    [facility]  Assessment & Plan  POA with VLAD superimposed on stage 3 CKD   Cr 1 78 on admission   Patient did not eat all morning and lives in house without [facility]   Suspect prerenal in setting of dehydration   Resolved with IV fluids  Creat is 1 18  Iv fluids d/jacobo    [facility] )  Assessment & Plan  History of tonsil cancer noted     Hypothyroidism, adult  Assessment & Plan  History of but not on any medications   tsh wnl    Generalized anxiety disorder  Assessment & Plan  Ativan prn   PDMP reviewed on admission   Effexor per home regimen       * Syncope  Assessment & Plan  POA with syncope while shopping at [employer], patient did not eat all day and then felt lightheaded while shopping and fell to the ground at the register but did not hit her head  Of note, she lives alone in a house without [facility]   Due to dehydration and orthostatic hypotension  Physical therapy cleared pt for discharge  VTE Pharmacologic Prophylaxis:   Pharmacologic agent: Heparin  Mechanical VTE Prophylaxis in Place: Yes    Time Spent for Care: 20 minutes  More than 50% of total time spent on counseling and coordination of care as described above  Current Length of Stay: 1 day(s)  Current Patient Status: Inpatient     Discharge Plan / Estimated Discharge Date:  Pt is cleared for discharge today but she wants some time to work on a plan for outpt  She will either be discharged later today vrs tomorrow  Code Status: Level 1 - Full Code      Subjective:   Pt seen and examined pt is very anxious about everything she has to do at home   She states she was getting anxious during our conversation  She states she is worried she might pass out again and this could happen while she is driving her car  Did discuss in full detail about the etiology of her syncope  Discussed about dehydration  Objective:     Vitals:   Temp (24hrs), Av 6 °F (36 4 °C), Min:97 2 °F (36 2 °C), Max:98 1 °F (36 7 °C)    Temp:  [97 2 °F (36 2 °C)-98 1 °F (36 7 °C)] 98 1 °F (36 7 °C)  HR:  [63-79] 76  Resp:  [18] 18  BP: (109-131)/(69-89) 109/69  SpO2:  [92 %-96 %] 96 %  Body mass index is 24 1 kg/m²  Input and Output Summary (last 24 hours): Intake/Output Summary (Last 24 hours) at 2021 1452  Last data filed at 2021 1235  Gross per 24 hour   Intake 1700 ml   Output 1150 ml   Net 550 ml       Physical Exam:   Physical Exam  Constitutional:       Appearance: Normal appearance  HENT:      Head: Normocephalic and atraumatic  Eyes:      Extraocular Movements: Extraocular movements intact  Pupils: Pupils are equal, round, and reactive to light  Cardiovascular:      Rate and Rhythm: Normal rate and regular rhythm  Heart sounds: No murmur  No friction rub  No gallop  Pulmonary:      Effort: Pulmonary effort is normal  No respiratory distress  Breath sounds: Normal breath sounds  No wheezing or rales  Abdominal:      General: Bowel sounds are normal  There is no distension  Palpations: Abdomen is soft  Tenderness: There is no abdominal tenderness  There is no guarding  Musculoskeletal:      Right lower leg: No edema  Left lower leg: No edema  Neurological:      Mental Status: She is alert and oriented to person, place, and time         Additional Data:     Labs:  Results from last 7 days   Lab Units 21  0459   WBC Thousand/uL 5 15   HEMOGLOBIN g/dL 9 9*   HEMATOCRIT % 31 5*   PLATELETS Thousands/uL 225   NEUTROS PCT % 65   LYMPHS PCT % 20   MONOS PCT % 12   EOS PCT % 2     Results from last 7 days   Lab Units 21  0456 06/08/21  1253   SODIUM mmol/L 144 142   POTASSIUM mmol/L 3 6 4 4   CHLORIDE mmol/L 107 101   CO2 mmol/L 30 30   BUN mg/dL 27* 30*   CREATININE mg/dL 1 18 1 78*   ANION GAP mmol/L 7 11   CALCIUM mg/dL 8 7 10 3*   ALBUMIN g/dL  --  3 6   TOTAL BILIRUBIN mg/dL  --  0 51   ALK PHOS U/L  --  80   ALT U/L  --  28   AST U/L  --  26   GLUCOSE RANDOM mg/dL 91 113                         Recent Cultures (last 7 days):           Lines/Drains:  Invasive Devices     Peripheral Intravenous Line            Peripheral IV 06/08/21 Right Antecubital 1 day                Last 24 Hours Medication List:   Current Facility-Administered Medications   Medication Dose Route Frequency Provider Last Rate    acetaminophen  650 mg Oral Q6H PRN Rochelle Sidhu PA-C      bacitracin  1 small application Topical BID Selena Desai DO      heparin (porcine)  5,000 Units Subcutaneous Q8H [address] Rachael Ambrocio PA-C      LORazepam  1 mg Oral Q6H PRN Rochelle Sidhu PA-C      ondansetron  4 mg Intravenous Q6H PRN Rachael Ambrocio PA-C      venlafaxine  75 mg Oral Daily Rochelle Sidhu PA-C          Today, Patient Was Seen By: Dudley Diaz DO

## 2021-06-09 NOTE — ASSESSMENT & PLAN NOTE
POA with VLAD superimposed on stage 3 CKD   Cr 1 78 on admission   Patient did not eat all morning and lives in house without [facility]   Suspect prerenal in setting of dehydration   Resolved with IV fluids   Creat is 1 18  Iv fluids d/jacobo

## 2021-06-09 NOTE — UTILIZATION REVIEW
Initial Clinical Review    Admission: Date/Time/Statement:   Admission Orders (From admission, onward)     Ordered        06/08/21 1543  Inpatient Admission  Once                   Orders Placed This Encounter   Procedures    Inpatient Admission     Standing Status:   Standing     Number of Occurrences:   1     Order Specific Question:   Level of Care     Answer:   Med Surg [16]     Order Specific Question:   Estimated length of stay     Answer:   More than 2 Midnights     Order Specific Question:   Certification     Answer:   I certify that inpatient services are medically necessary for this patient for a duration of greater than two midnights  See H&P and MD Progress Notes for additional information about the patient's course of treatment  ED Arrival Information     Expected Arrival Acuity Means of Arrival Escorted By Service Admission Type    - 6/8/2021 12:10 Urgent Ambulance [facility] Urgent    Arrival Complaint    low blood pressure        Chief Complaint   Patient presents with    Syncope     tow episodes of syncope today once she caught herself once with loc for about 1 min at [facility] lowered to the floor did not hit head (pt states she has been getting light headed since her hip replacement but she usually can catch herself)       Initial Presentation: 80 yo fem w/hx tonsil ca, jaw osteonecrosis, depression, ckd to ED by EMS from [facility], admitted as inpatient due to syncope and VLAD  Presented after passed out while shopping  Cites not eating all day, went shopping around 2pm, and felt lightheaded like she would pass out  She then fell to the ground  Remembers being picked up, didn't hit her head  Similar episode last week after which she returned home safely  Has no air conditioning at home in 90 degree head this week  She is under a lot of stress for many years  Exam reveals clear lungs, tearful and anxious  CT head/c spine/CXR wnl     Cardiac workup, tele in progress with IVF and serial labs  Avoiding nephrotoxins and hypotension  Ca oxylate crystals in urine-if renal function unimproved, will consider additional imaging  Date: 6/9   Day 2: very anxious today  Lungs clear, abd soft, non tender, no edema, a&o x 3  suspect prerenal VLAD in setting of dehydration and no AC at home  VLAD resolved with IVF, thus IVF dc  She has orthostatic hypotension due to dehydration, which also resolved  Teds placed  She is very anxious about her home care and worried that she might pass out again while she is driving her car  Counseling provided regarding dehydration  outpt plan must be devised before she can be DC       ED Triage Vitals   Temperature Pulse Respirations Blood Pressure SpO2   06/08/21 1224 06/08/21 1224 06/08/21 1224 06/08/21 1224 06/08/21 1224   97 6 °F (36 4 °C) 74 18 128/80 98 %      Temp Source Heart Rate Source Patient Position - Orthostatic VS BP Location FiO2 (%)   06/08/21 1224 06/08/21 1224 06/08/21 1224 06/08/21 1224 --   Oral Monitor Lying Right arm       Pain Score       06/08/21 1930       No Pain          Wt Readings from Last 1 Encounters:   04/01/21 63 7 kg (140 lb 6 4 oz)     Additional Vital Signs:   Date/Time  Temp  Pulse  Resp  BP  MAP (mmHg)  SpO2  O2 Device  Patient Position - Orthostatic VS   06/09/21 1533  97 4 °F (36 3 °C)Abnormal   71  18  155/93  --  96 %  None (Room air)  Sitting       Date/Time  Temp  Pulse  Resp  BP  MAP (mmHg)  SpO2  O2 Device  Patient Position - Orthostatic VS   06/09/21 1141  98 1 °F (36 7 °C)  76  18  109/69  --  96 %  None (Room air)  Lying   06/09/21 0845  --  --  --  --  --  --  None (Room air)  --   06/09/21 0729  97 5 °F (36 4 °C)  65  18  124/80  --  95 %  None (Room air)  Lying   06/09/21 0316  97 8 °F (36 6 °C)  75  18  122/77  --  92 %  None (Room air)  Lying   06/08/21 2330  97 6 °F (36 4 °C)  63  18  116/71  --  94 %  None (Room air)  Lying   06/08/21 1900  97 6 °F (36 4 °C)  79  18  127/69  92  96 %  None (Room air) Lying   06/08/21 1802  --  --  --  --  --  --  None (Room air)  --   06/08/21 1659  97 2 °F (36 2 °C)Abnormal   77  18  111/76  --  93 %  None (Room air)  Lying   06/08/21 1626  --  77  18  131/89  --  96 %  None (Room air)  Lying   06/08/21 1430  --  79  18  110/61  --  98 %  None (Room air)  Lying   06/08/21 1343  --  78  19  114/62  82  94 %  --  --   06/08/21 1306  --  87  18  78/52Abnormal   --  --  --  Standing - Orthostatic VS   06/08/21 1305  --  85  17  82/54Abnormal   --  --  --  Sitting - Orthostatic VS   06/08/21 1304  --  73  17  121/61  --  --  --  Lying - Orthostatic VS       Pertinent Labs/Diagnostic Test Results:     6/8 PCXR: nothing acute  6/8 CT head/c spine: nothing acute  6/8 EKG nsr  6/9 EKG nsr    Results from last 7 days   Lab Units 06/09/21  0459 06/08/21  1253   WBC Thousand/uL 5 15 5 68   HEMOGLOBIN g/dL 9 9* 12 3   HEMATOCRIT % 31 5* 38 6   PLATELETS Thousands/uL 225 280   NEUTROS ABS Thousands/µL 3 36 4 72         Results from last 7 days   Lab Units 06/09/21  0459 06/08/21  1253   SODIUM mmol/L 144 142   POTASSIUM mmol/L 3 6 4 4   CHLORIDE mmol/L 107 101   CO2 mmol/L 30 30   ANION GAP mmol/L 7 11   BUN mg/dL 27* 30*   CREATININE mg/dL 1 18 1 78*   EGFR ml/min/1 73sq m 47 28   CALCIUM mg/dL 8 7 10 3*     Results from last 7 days   Lab Units 06/08/21  1253   AST U/L 26   ALT U/L 28   ALK PHOS U/L 80   TOTAL PROTEIN g/dL 7 3   ALBUMIN g/dL 3 6   TOTAL BILIRUBIN mg/dL 0 51         Results from last 7 days   Lab Units 06/09/21  0459 06/08/21  1253   GLUCOSE RANDOM mg/dL 91 113       Results from last 7 days   Lab Units 06/08/21  1253   TROPONIN I ng/mL <0 02     Results from last 7 days   Lab Units 06/08/21  1253   TSH 3RD GENERATON uIU/mL 1 985       Results from last 7 days   Lab Units 06/08/21  1253   LIPASE u/L 166     Results from last 7 days   Lab Units 06/08/21  1418   CLARITY UA  Clear   COLOR UA  Yellow   SPEC GRAV UA  >=1 030   PH UA  5 5   GLUCOSE UA mg/dl Negative   KETONES UA mg/dl 40 (2+)*   BLOOD UA  Trace*   PROTEIN UA mg/dl 30 (1+)*   NITRITE UA  Negative   BILIRUBIN UA  Interference- unable to analyze*   UROBILINOGEN UA E U /dl 0 2   LEUKOCYTES UA  Negative   WBC UA /hpf None Seen   RBC UA /hpf None Seen   BACTERIA UA /hpf Occasional   EPITHELIAL CELLS WET PREP /hpf Occasional     ED Treatment:   Medication Administration from 06/08/2021 1210 to 06/08/2021 1707       Date/Time Order Dose Route Action     06/08/2021 1626 sodium chloride 0 9 % bolus 1,000 mL 1,000 mL Intravenous New Bag        Past Medical History:   Diagnosis Date    Cancer [facility])     tonsilar cancer followed by radiation and chemo in 2004    Fall     last assessed - [id number]    Impacted cerumen of left ear     last assessed - [id number]    Malignant neoplasm without specification of site [facility]) 06/2004    Mild cognitive impairment     Phlebitis and thrombophlebitis of deep vessels of lower extremities, unspecified laterality ([facility] )     Stroke ([facility] )      Present on Admission:   [facility]   Syncope   Hypothyroidism, adult   Generalized anxiety disorder   Hypotension   [facility]      Admitting Diagnosis: Orthostatic hypotension [I95 1]  Syncope [R55]  VLAD ([facility]) ([facility] ) [N17 9]  Age/Sex: 79 y o  female  Admission Orders:  Scheduled Medications:  heparin (porcine), 5,000 Units, Subcutaneous, Q8H [facility]  venlafaxine, 75 mg, Oral, Daily      Continuous IV Infusions:sodium chloride 0 9 % infusion   Rate: 125 mL/hr Dose: 125 mL/hr  Freq: Continuous Route: IV  Last Dose: Stopped (06/09/21 0943)  Start: 06/08/21 1715 End: 06/09/21 0937     PRN Meds:  acetaminophen, 650 mg, Oral, Q6H PRN  LORazepam, 1 mg, Oral, Q6H PRN 6/9 @0915  ondansetron, 4 mg, Intravenous, Q6H PRN    SCD  Tele  House diet    IP CONSULT TO CASE MANAGEMENT    Network Utilization Review Department  ATTENTION: Please call with any questions or concerns to [phone] and carefully listen to the prompts so that you are directed to the right person  All voicemails are confidential   [facility] all requests for admission clinical reviews, approved or denied determinations and any other requests to dedicated fax number below belonging to the campus where the patient is receiving treatment   List of dedicated fax numbers for the Facilities:  [address] DENIALS (Administrative/Medical Necessity) [phone]   [address] (Maternity/NICU/Pediatrics) [phone]   [address] [place] [address] [id number] [place] [address] [address] [facility] [phone]

## 2021-06-10 VITALS
SYSTOLIC BLOOD PRESSURE: 145 MMHG | BODY MASS INDEX: 24.1 KG/M2 | TEMPERATURE: 98.7 F | HEIGHT: 64 IN | RESPIRATION RATE: 16 BRPM | DIASTOLIC BLOOD PRESSURE: 93 MMHG | HEART RATE: 78 BPM | OXYGEN SATURATION: 90 %

## 2021-06-10 PROBLEM — I95.1 ORTHOSTATIC HYPOTENSION: Status: RESOLVED | Noted: 2021-06-08 | Resolved: 2021-06-10

## 2021-06-10 PROBLEM — N17.9 AKI (ACUTE KIDNEY INJURY) (HCC): Status: RESOLVED | Noted: 2021-06-08 | Resolved: 2021-06-10

## 2021-06-10 PROBLEM — R55 SYNCOPE: Status: RESOLVED | Noted: 2021-06-08 | Resolved: 2021-06-10

## 2021-06-10 PROCEDURE — 99239 HOSP IP/OBS DSCHRG MGMT >30: CPT | Performed by: INTERNAL MEDICINE

## 2021-06-10 RX ADMIN — VENLAFAXINE HYDROCHLORIDE 75 MG: 75 CAPSULE, EXTENDED RELEASE ORAL at 09:27

## 2021-06-10 RX ADMIN — LORAZEPAM 1 MG: 1 TABLET ORAL at 09:28

## 2021-06-10 NOTE — PLAN OF CARE
Problem: Potential for Falls  Goal: Patient will remain free of falls  Description: INTERVENTIONS:  - Assess patient frequently for physical needs  -  Identify cognitive and physical deficits and behaviors that affect risk of falls  -  [place] fall precautions as indicated by assessment   - Educate patient/family on patient safety including physical limitations  - Instruct patient to call for assistance with activity based on assessment  - Modify environment to reduce risk of injury  - Consider OT/PT consult to assist with strengthening/mobility  Outcome: Progressing     Problem: PAIN - ADULT  Goal: Verbalizes/displays adequate comfort level or baseline comfort level  Description: Interventions:  - Encourage patient to monitor pain and request assistance  - Assess pain using appropriate pain scale  - Administer analgesics based on type and severity of pain and evaluate response  - Implement non-pharmacological measures as appropriate and evaluate response  - Consider cultural and social influences on pain and pain management  - Notify physician/advanced practitioner if interventions unsuccessful or patient reports new pain  Outcome: Progressing     Problem: SAFETY ADULT  Goal: Patient will remain free of falls  Description: INTERVENTIONS:  - Assess patient frequently for physical needs  -  Identify cognitive and physical deficits and behaviors that affect risk of falls    -  [place] fall precautions as indicated by assessment   - Educate patient/family on patient safety including physical limitations  - Instruct patient to call for assistance with activity based on assessment  - Modify environment to reduce risk of injury  - Consider OT/PT consult to assist with strengthening/mobility  Outcome: Progressing  Goal: Maintain or return to baseline ADL function  Description: INTERVENTIONS:  -  Assess patient's ability to carry out ADLs; assess patient's baseline for ADL function and identify physical deficits which impact ability to perform ADLs (bathing, care of mouth/teeth, toileting, grooming, dressing, etc )  - Assess/evaluate cause of self-care deficits   - Assess range of motion  - Assess patient's mobility; develop plan if impaired  - Assess patient's need for assistive devices and provide as appropriate  - Encourage maximum independence but intervene and supervise when necessary  - Involve family in performance of ADLs  - Assess for home care needs following discharge   - Consider OT consult to assist with ADL evaluation and planning for discharge  - Provide patient education as appropriate  Outcome: Progressing  Goal: Maintain or return mobility status to optimal level  Description: INTERVENTIONS:  - Assess patient's baseline mobility status (ambulation, transfers, stairs, etc )    - Identify cognitive and physical deficits and behaviors that affect mobility  - Identify mobility aids required to assist with transfers and/or ambulation (gait belt, sit-to-stand, lift, walker, cane, etc )  - [place] fall precautions as indicated by assessment  - Record patient progress and toleration of activity level on Mobility SBAR; progress patient to next Phase/Stage  - Instruct patient to call for assistance with activity based on assessment  - Consider rehabilitation consult to assist with strengthening/weightbearing, etc   Outcome: Progressing     Problem: DISCHARGE PLANNING  Goal: Discharge to home or other facility with appropriate resources  Description: INTERVENTIONS:  - Identify barriers to discharge w/patient and caregiver  - Arrange for needed discharge resources and transportation as appropriate  - Identify discharge learning needs (meds, wound care, etc )  - Arrange for interpretive services to assist at discharge as needed  - Refer to Case Management Department for coordinating discharge planning if the patient needs post-hospital services based on physician/advanced practitioner order or complex needs related to functional status, cognitive ability, or social support system  Outcome: Progressing     Problem: Knowledge Deficit  Goal: Patient/family/caregiver demonstrates understanding of disease process, treatment plan, medications, and discharge instructions  Description: Complete learning assessment and assess knowledge base    Interventions:  - Provide teaching at level of understanding  - Provide teaching via preferred learning methods  Outcome: Progressing     Problem: MUSCULOSKELETAL - ADULT  Goal: Maintain or return mobility to safest level of function  Description: INTERVENTIONS:  - Assess patient's ability to carry out ADLs; assess patient's baseline for ADL function and identify physical deficits which impact ability to perform ADLs (bathing, care of mouth/teeth, toileting, grooming, dressing, etc )  - Assess/evaluate cause of self-care deficits   - Assess range of motion  - Assess patient's mobility  - Assess patient's need for assistive devices and provide as appropriate  - Encourage maximum independence but intervene and supervise when necessary  - Involve family in performance of ADLs  - Assess for home care needs following discharge   - Consider OT consult to assist with ADL evaluation and planning for discharge  - Provide patient education as appropriate  Outcome: Progressing  Goal: Maintain proper alignment of affected body part  Description: INTERVENTIONS:  - Support, maintain and protect limb and body alignment  - Provide patient/ family with appropriate education  Outcome: Progressing     Problem: CARDIOVASCULAR - ADULT  Goal: Maintains optimal cardiac output and hemodynamic stability  Description: INTERVENTIONS:  - Monitor I/O, vital signs and rhythm  - Monitor for S/S and trends of decreased cardiac output  - Administer and titrate ordered vasoactive medications to optimize hemodynamic stability  - Assess quality of pulses, skin color and temperature  - Assess for signs of decreased coronary artery perfusion  - Instruct patient to report change in severity of symptoms  Outcome: Progressing  Goal: Absence of cardiac dysrhythmias or at baseline rhythm  Description: INTERVENTIONS:  - Continuous cardiac monitoring, vital signs, obtain 12 lead EKG if ordered  - Administer antiarrhythmic and heart rate control medications as ordered  - Monitor electrolytes and administer replacement therapy as ordered  Outcome: Progressing

## 2021-06-10 NOTE — NURSING NOTE
Pt discharged home  Home meds returned to pt  Discharge teaching and instructions provided, pt verbalized understanding of the same  IV removed  Pt walking accompanied by PCA to main lobby  Pt being transported by [facility]

## 2021-06-10 NOTE — PROGRESS NOTES
[address] [place] [address]  Progress Note - Osei Martinez 1950, 79 y o  female MRN: [id number]  Unit/Bed#: E4 -01 Encounter: [id number]  Primary Care Provider: Meeta Collier DO   Date and time admitted to hospital: 2021 12:10 PM    VLAD (acute kidney injury) [facility])  Assessment & Plan  POA with VLAD superimposed on stage 3 CKD   Cr 1 78 on admission   Patient did not eat all morning and lives in house without [facility]   Suspect prerenal in setting of dehydration   Resolved with IV fluids  Creat is 1 18  Iv fluids d/jacobo    Tonsil cancer ([address] )  Assessment & Plan  History of tonsil cancer noted     Hypothyroidism, adult  Assessment & Plan  History of but not on any medications   tsh wnl    Generalized anxiety disorder  Assessment & Plan  Ativan prn   PDMP reviewed on admission   Effexor per home regimen       * Syncope  Assessment & Plan  POA with syncope while shopping at [facility], patient did not eat all day and then felt lightheaded while shopping and fell to the ground at the register but did not hit her head  Of note, she lives alone in a house without [facility]   Due to dehydration and orthostatic hypotension  Physical therapy cleared pt for discharge  Discharge Plan / Estimated Discharge Date: d/c to home  Code Status: Level 1 - Full Code      Subjective:   Pt seen and examined  Pt is doing well this am  She is more calm  She understands her dx now  No further episodes of lightheadedness, no dizziness  No f/c no cp no sob no n/v/d no abd pain  Orthostatic vitals have normalized  Objective:     Vitals:   Temp (24hrs), Av 1 °F (36 7 °C), Min:97 4 °F (36 3 °C), Max:98 7 °F (37 1 °C)    Temp:  [97 4 °F (36 3 °C)-98 7 °F (37 1 °C)] 98 7 °F (37 1 °C)  HR:  [71-78] 78  Resp:  [16-18] 16  BP: (109-155)/(69-93) 145/93  SpO2:  [90 %-96 %] 90 %  Body mass index is 24 1 kg/m²  Input and Output Summary (last 24 hours):        Intake/Output Summary (Last 24 hours) at 6/10/2021 1046  Last data filed at 6/10/2021 0200  Gross per 24 hour   Intake --   Output 1700 ml   Net -1700 ml       Physical Exam:   Physical Exam  Constitutional:       Appearance: Normal appearance  HENT:      Head: Normocephalic and atraumatic  Eyes:      Extraocular Movements: Extraocular movements intact  Pupils: Pupils are equal, round, and reactive to light  Cardiovascular:      Rate and Rhythm: Normal rate  Heart sounds: No murmur  No friction rub  No gallop  Pulmonary:      Effort: Pulmonary effort is normal  No respiratory distress  Breath sounds: Normal breath sounds  No wheezing or rales  Abdominal:      General: Bowel sounds are normal  There is no distension  Palpations: Abdomen is soft  Tenderness: There is no abdominal tenderness  There is no guarding  Musculoskeletal:      Right lower leg: No edema  Left lower leg: No edema  Neurological:      Mental Status: She is alert and oriented to person, place, and time          Additional Data:     Labs:  Results from last 7 days   Lab Units 06/09/21  0459   WBC Thousand/uL 5 15   HEMOGLOBIN g/dL 9 9*   HEMATOCRIT % 31 5*   PLATELETS Thousands/uL 225   NEUTROS PCT % 65   LYMPHS PCT % 20   MONOS PCT % 12   EOS PCT % 2     Results from last 7 days   Lab Units 06/09/21  0459 06/08/21  1253   SODIUM mmol/L 144 142   POTASSIUM mmol/L 3 6 4 4   CHLORIDE mmol/L 107 101   CO2 mmol/L 30 30   BUN mg/dL 27* 30*   CREATININE mg/dL 1 18 1 78*   ANION GAP mmol/L 7 11   CALCIUM mg/dL 8 7 10 3*   ALBUMIN g/dL  --  3 6   TOTAL BILIRUBIN mg/dL  --  0 51   ALK PHOS U/L  --  80   ALT U/L  --  28   AST U/L  --  26   GLUCOSE RANDOM mg/dL 91 113                       Recent Cultures (last 7 days):           Lines/Drains:  Invasive Devices     Peripheral Intravenous Line            Peripheral IV 06/08/21 Right Antecubital 1 day                Last 24 Hours Medication List:   Current Facility-Administered Medications   Medication Dose Route Frequency Provider Last Rate    acetaminophen  650 mg Oral Q6H PRN Loan Concepcion PA-C      bacitracin  1 small application Topical BID Selena Desai DO      heparin (porcine)  5,000 Units Subcutaneous Q8H [facility] Rachael Ambrocio PA-C      LORazepam  1 mg Oral Q6H PRN Loan Concepcion PA-C      ondansetron  4 mg Intravenous Q6H PRN Loan Concepcion PA-C      venlafaxine  75 mg Oral Daily Loan Concepcion PA-C          Today, Patient Was Seen By: Madeline Yip DO

## 2021-06-10 NOTE — CASE MANAGEMENT
MD has discharged pt home today  Nursing Manager reports pt needs assistance with services at home  Met with pt and informed her called [facility] [employer] for fan or A/C  Pt stated she was shopping in [place] for a fan when she left light headed and gave to hospital  Explained the [facility] can assist with other services and gave her phone number  Also explained and gave pt [employer] information and HHA list  Pt will need a ride to her car at [address] at the [place]  Pt signed the [employer] form and this was put in bin  Pt is asking to leave at 1330  TC to  [employer] for  time of 1330  RN is aware of this

## 2021-06-10 NOTE — ASSESSMENT & PLAN NOTE
POA with syncope while shopping at [place], patient did not eat all day and then felt lightheaded while shopping and fell to the ground at the register but did not hit her head  Of note, she lives alone in a house without [facility]   Due to dehydration and orthostatic hypotension  Physical therapy cleared pt for discharge

## 2021-06-10 NOTE — ASSESSMENT & PLAN NOTE
POA with VLAD superimposed on stage 3 CKD   Cr 1 78 on admission   Patient did not eat all morning and lives in house without [facility]   Suspect prerenal in setting of dehydration   Resolved with IV fluids   Creat is 1 18  Iv fluids d/jacobo

## 2021-06-10 NOTE — PLAN OF CARE
Problem: Potential for Falls  Goal: Patient will remain free of falls  Description: INTERVENTIONS:  - Assess patient frequently for physical needs  -  Identify cognitive and physical deficits and behaviors that affect risk of falls  -  [place] fall precautions as indicated by assessment   - Educate patient/family on patient safety including physical limitations  - Instruct patient to call for assistance with activity based on assessment  - Modify environment to reduce risk of injury  - Consider OT/PT consult to assist with strengthening/mobility  Outcome: Progressing     Problem: PAIN - ADULT  Goal: Verbalizes/displays adequate comfort level or baseline comfort level  Description: Interventions:  - Encourage patient to monitor pain and request assistance  - Assess pain using appropriate pain scale  - Administer analgesics based on type and severity of pain and evaluate response  - Implement non-pharmacological measures as appropriate and evaluate response  - Consider cultural and social influences on pain and pain management  - Notify physician/advanced practitioner if interventions unsuccessful or patient reports new pain  Outcome: Progressing     Problem: SAFETY ADULT  Goal: Patient will remain free of falls  Description: INTERVENTIONS:  - Assess patient frequently for physical needs  -  Identify cognitive and physical deficits and behaviors that affect risk of falls    -  [place] fall precautions as indicated by assessment   - Educate patient/family on patient safety including physical limitations  - Instruct patient to call for assistance with activity based on assessment  - Modify environment to reduce risk of injury  - Consider OT/PT consult to assist with strengthening/mobility  Outcome: Progressing  Goal: Maintain or return to baseline ADL function  Description: INTERVENTIONS:  -  Assess patient's ability to carry out ADLs; assess patient's baseline for ADL function and identify physical deficits which impact ability to perform ADLs (bathing, care of mouth/teeth, toileting, grooming, dressing, etc )  - Assess/evaluate cause of self-care deficits   - Assess range of motion  - Assess patient's mobility; develop plan if impaired  - Assess patient's need for assistive devices and provide as appropriate  - Encourage maximum independence but intervene and supervise when necessary  - Involve family in performance of ADLs  - Assess for home care needs following discharge   - Consider OT consult to assist with ADL evaluation and planning for discharge  - Provide patient education as appropriate  Outcome: Progressing  Goal: Maintain or return mobility status to optimal level  Description: INTERVENTIONS:  - Assess patient's baseline mobility status (ambulation, transfers, stairs, etc )    - Identify cognitive and physical deficits and behaviors that affect mobility  - Identify mobility aids required to assist with transfers and/or ambulation (gait belt, sit-to-stand, lift, walker, cane, etc )  - [place] fall precautions as indicated by assessment  - Record patient progress and toleration of activity level on Mobility SBAR; progress patient to next Phase/Stage  - Instruct patient to call for assistance with activity based on assessment  - Consider rehabilitation consult to assist with strengthening/weightbearing, etc   Outcome: Progressing     Problem: DISCHARGE PLANNING  Goal: Discharge to home or other facility with appropriate resources  Description: INTERVENTIONS:  - Identify barriers to discharge w/patient and caregiver  - Arrange for needed discharge resources and transportation as appropriate  - Identify discharge learning needs (meds, wound care, etc )  - Arrange for interpretive services to assist at discharge as needed  - Refer to Case Management Department for coordinating discharge planning if the patient needs post-hospital services based on physician/advanced practitioner order or complex needs related to functional status, cognitive ability, or social support system  Outcome: Progressing     Problem: Knowledge Deficit  Goal: Patient/family/caregiver demonstrates understanding of disease process, treatment plan, medications, and discharge instructions  Description: Complete learning assessment and assess knowledge base    Interventions:  - Provide teaching at level of understanding  - Provide teaching via preferred learning methods  Outcome: Progressing     Problem: MUSCULOSKELETAL - ADULT  Goal: Maintain or return mobility to safest level of function  Description: INTERVENTIONS:  - Assess patient's ability to carry out ADLs; assess patient's baseline for ADL function and identify physical deficits which impact ability to perform ADLs (bathing, care of mouth/teeth, toileting, grooming, dressing, etc )  - Assess/evaluate cause of self-care deficits   - Assess range of motion  - Assess patient's mobility  - Assess patient's need for assistive devices and provide as appropriate  - Encourage maximum independence but intervene and supervise when necessary  - Involve family in performance of ADLs  - Assess for home care needs following discharge   - Consider OT consult to assist with ADL evaluation and planning for discharge  - Provide patient education as appropriate  Outcome: Progressing  Goal: Maintain proper alignment of affected body part  Description: INTERVENTIONS:  - Support, maintain and protect limb and body alignment  - Provide patient/ family with appropriate education  Outcome: Progressing     Problem: CARDIOVASCULAR - ADULT  Goal: Maintains optimal cardiac output and hemodynamic stability  Description: INTERVENTIONS:  - Monitor I/O, vital signs and rhythm  - Monitor for S/S and trends of decreased cardiac output  - Administer and titrate ordered vasoactive medications to optimize hemodynamic stability  - Assess quality of pulses, skin color and temperature  - Assess for signs of decreased coronary artery perfusion  - Instruct patient to report change in severity of symptoms  Outcome: Progressing  Goal: Absence of cardiac dysrhythmias or at baseline rhythm  Description: INTERVENTIONS:  - Continuous cardiac monitoring, vital signs, obtain 12 lead EKG if ordered  - Administer antiarrhythmic and heart rate control medications as ordered  - Monitor electrolytes and administer replacement therapy as ordered  Outcome: Progressing

## 2021-06-10 NOTE — DISCHARGE SUMMARY
Discharge Summary - [facility] Internal Medicine    Patient Information: Amalia Ray 79 y o  female MRN: [id number]  Unit/Bed#: E4 -01 Encounter: [id number]    Discharging Physician / Practitioner: Evan Oseguera DO  PCP: Jaqueline Mitchell DO  Admission Date: 6/8/2021  Discharge Date: 06/10/21    Disposition:     Home     Reason for Admission: syncope    Discharge Diagnoses:     Principal Problem:    Syncope  Active Problems:    Generalized anxiety disorder    Hypothyroidism, adult    Tonsil cancer ([facility] )    VLAD (acute kidney injury) ([facility] )  Resolved Problems:    Orthostatic hypotension      Consultations During Hospital Stay:  none    Procedures Performed:     · none    Significant Findings / Test Results:   Xr Chest 2 Views  Result Date: 6/9/2021  Impression: No focal consolidation, pleural effusion, or pneumothorax  Ct Head Without Contrast  Result Date: 6/8/2021  Impression: No acute intracranial abnormality  Ct Spine Cervical Without Contrast  Result Date: 6/8/2021  Impression: No cervical spine fracture or traumatic malalignment  Incidental Findings:   · none    Test Results Pending at Discharge (will require follow up):   · none     Outpatient Tests Requested:  None    Hospital Course:     Amalia Ray is a 79 y o  female patient who originally presented to the hospital on 6/8/2021 due to syncope  Pt was at [employer] for  [place]  Her syncopal episodes was due to dehydration with orthostatic hypotension along with pt not eating breakfast in am  She was hydrated and orthostatic hypotension resolved  Pt was educated on multiple times to drink plenty of water to stay hydrated  She was also educated on eating breakfast in the am     She was evaluated by physical therapy who stated she did not need further physical therapy assistance inpt our at [facility]  Pt was discharged to home       Discharge Day Visit / Exam:     * Please refer to separate progress note for these details *    Discharge instructions/Information to patient and family:   See after visit summary for information provided to patient and family  Provisions for Follow-Up Care:  See after visit summary for information related to follow-up care and any pertinent home health orders  Discharge Statement:  I spent 36 minutes discharging the patient  This time was spent on the day of discharge  I had direct contact with the patient on the day of discharge  Greater than 50% of the total time was spent examining patient, answering all patient questions, arranging and discussing plan of care with patient as well as directly providing post-discharge instructions  Additional time then spent on discharge activities  Discharge Medications:  See after visit summary for reconciled discharge medications provided to patient and family

## 2021-06-11 ENCOUNTER — TRANSITIONAL CARE MANAGEMENT (OUTPATIENT)
Dept: FAMILY MEDICINE CLINIC | Facility: CLINIC | Age: 71
End: 2021-06-11

## 2021-06-21 ENCOUNTER — OFFICE VISIT (OUTPATIENT)
Dept: FAMILY MEDICINE CLINIC | Facility: CLINIC | Age: 71
End: 2021-06-21
Payer: COMMERCIAL

## 2021-06-21 VITALS
BODY MASS INDEX: 23.63 KG/M2 | HEART RATE: 74 BPM | SYSTOLIC BLOOD PRESSURE: 122 MMHG | DIASTOLIC BLOOD PRESSURE: 78 MMHG | WEIGHT: 138.4 LBS | RESPIRATION RATE: 16 BRPM | OXYGEN SATURATION: 98 % | TEMPERATURE: 96.7 F | HEIGHT: 64 IN

## 2021-06-21 DIAGNOSIS — G93.89 ENCEPHALOMALACIA ON IMAGING STUDY: ICD-10-CM

## 2021-06-21 DIAGNOSIS — N18.32 STAGE 3B CHRONIC KIDNEY DISEASE (HCC): ICD-10-CM

## 2021-06-21 DIAGNOSIS — R90.89 ABNORMAL CT OF BRAIN: ICD-10-CM

## 2021-06-21 DIAGNOSIS — Z13.6 ENCOUNTER FOR SCREENING FOR VASCULAR DISEASE: ICD-10-CM

## 2021-06-21 DIAGNOSIS — R55 SYNCOPE, UNSPECIFIED SYNCOPE TYPE: Primary | ICD-10-CM

## 2021-06-21 PROCEDURE — 1160F RVW MEDS BY RX/DR IN RCRD: CPT | Performed by: FAMILY MEDICINE

## 2021-06-21 PROCEDURE — 3725F SCREEN DEPRESSION PERFORMED: CPT | Performed by: FAMILY MEDICINE

## 2021-06-21 PROCEDURE — 1101F PT FALLS ASSESS-DOCD LE1/YR: CPT | Performed by: FAMILY MEDICINE

## 2021-06-21 PROCEDURE — 3288F FALL RISK ASSESSMENT DOCD: CPT | Performed by: FAMILY MEDICINE

## 2021-06-21 PROCEDURE — 1036F TOBACCO NON-USER: CPT | Performed by: FAMILY MEDICINE

## 2021-06-21 PROCEDURE — G0439 PPPS, SUBSEQ VISIT: HCPCS | Performed by: FAMILY MEDICINE

## 2021-06-21 PROCEDURE — 3008F BODY MASS INDEX DOCD: CPT | Performed by: FAMILY MEDICINE

## 2021-06-21 RX ORDER — MULTIVITAMIN WITH IRON
500 TABLET ORAL 2 TIMES DAILY
COMMUNITY

## 2021-06-21 RX ORDER — ACETAMINOPHEN 500 MG
1000 TABLET ORAL EVERY 8 HOURS
COMMUNITY
Start: 2021-05-10 | End: 2022-01-16

## 2021-06-23 NOTE — PATIENT INSTRUCTIONS
Syncope   WHAT YOU NEED TO KNOW:   Syncope is also called fainting or passing out  Syncope is a sudden, temporary loss of consciousness, followed by a fall from a standing or sitting position  Syncope ranges from not serious to a sign of a more serious condition that needs to be treated  You can control some health conditions that cause syncope  Your healthcare providers can help you create a plan to manage syncope and prevent episodes  DISCHARGE INSTRUCTIONS:   Seek care immediately if:   · You are bleeding because you hit your head when you fainted  · You suddenly have double vision, difficulty speaking, numbness, and cannot move your arms or legs  · You have chest pain and trouble breathing  · You vomit blood or material that looks like coffee grounds  · You see blood in your bowel movement  Contact your healthcare provider if:   · You have new or worsening symptoms  · You have another syncope episode  · You have a headache, fast heartbeat, or feel too dizzy to stand up  · You have questions or concerns about your condition or care  Medicines:   · Medicines  may be needed to help your heart pump strongly and regularly  Your healthcare provider may also make changes to any medicines that are causing syncope  · Take your medicine as directed  Contact your healthcare provider if you think your medicine is not helping or if you have side effects  Tell him or her if you are allergic to any medicine  Keep a list of the medicines, vitamins, and herbs you take  Include the amounts, and when and why you take them  Bring the list or the pill bottles to follow-up visits  Carry your medicine list with you in case of an emergency  Follow up with your healthcare provider as directed:  Write down your questions so you remember to ask them during your visits  Manage syncope:   · Keep a record of your syncope episodes  Include your symptoms and your activity before and after the episode  The record can help your healthcare provider find the cause of your syncope and help you manage episodes  · Sit or lie down when needed  This includes when you feel dizzy, your throat is getting tight, and your vision changes  Raise your legs above the level of your heart  · Take slow, deep breaths if you start to breathe faster with anxiety or fear  This can help decrease dizziness and the feeling that you might faint  · Check your blood pressure often  This is important if you take medicine to lower your blood pressure  Check your blood pressure when you are lying down and when you are standing  Ask how often to check during the day  Keep a record of your blood pressure numbers  Your healthcare provider may use the record to help plan your treatment  Prevent a syncope episode:   · Move slowly and let yourself get used to one position before you move to another position  This is very important when you change from a lying or sitting position to a standing position  Take some deep breaths before you stand up from a lying position  Stand up slowly  Sudden movements may cause a fainting spell  Sit on the side of the bed or couch for a few minutes before you stand up  If you are on bedrest, try to be upright for about 2 hours each day, or as directed  Do not lock your legs if you are standing for a long period of time  Move your legs and bend your knees to keep blood flowing  · Follow your healthcare provider's recommendations  Your provider may  recommend that you drink more liquids to prevent dehydration  You may also need to have more salt to keep your blood pressure from dropping too low and causing syncope  Your provider will tell you how much liquid and sodium to have each day  He or she will also tell you how much physical activity is safe for you  This will depend on what is causing your syncope  · Watch for signs of low blood sugar    These include hunger, nervousness, sweating, and fast or fluttery heartbeats  Talk with your healthcare provider about ways to keep your blood sugar level steady  · Do not strain if you are constipated  You may faint if you strain to have a bowel movement  Walking is the best way to get your bowels moving  Eat foods high in fiber to make it easier to have a bowel movement  Good examples are high-fiber cereals, beans, vegetables, and whole-grain breads  Prune juice may help make bowel movements softer  · Be careful in hot weather  Heat can cause a syncope episode  Limit activity done outside on hot days  Physical activity in hot weather can lead to dehydration  This can cause an episode  © Copyright [address] Information is for End User's use only and may not be sold, redistributed or otherwise used for commercial purposes  All illustrations and images included in CareNotes® are the copyrighted property of [employer]  or [facility] Miguel Coles   The above information is an  only  It is not intended as medical advice for individual conditions or treatments  Talk to your doctor, nurse or pharmacist before following any medical regimen to see if it is safe and effective for you

## 2021-07-08 ENCOUNTER — HOSPITAL ENCOUNTER (OUTPATIENT)
Dept: MRI IMAGING | Facility: HOSPITAL | Age: 71
Discharge: HOME | End: 2021-07-08
Payer: COMMERCIAL

## 2021-07-08 DIAGNOSIS — R90.89 ABNORMAL CT OF BRAIN: ICD-10-CM

## 2021-07-08 PROCEDURE — 70551 MRI BRAIN STEM W/O DYE: CPT

## 2021-07-22 ENCOUNTER — HOSPITAL ENCOUNTER (OUTPATIENT)
Dept: NON INVASIVE DIAGNOSTICS | Facility: CLINIC | Age: 71
Discharge: HOME | End: 2021-07-22
Payer: COMMERCIAL

## 2021-07-22 DIAGNOSIS — Z13.6 ENCOUNTER FOR SCREENING FOR VASCULAR DISEASE: ICD-10-CM

## 2021-07-22 LAB
ABO GROUP BLD: NORMAL
BLD GP AB SCN SERPL QL: NORMAL
CHOLEST SERPL-MCNC: 293 MG/DL
CHOLEST/HDLC SERPL: 5.1 (CALC)
HDLC SERPL-MCNC: 58 MG/DL
LDLC SERPL CALC-MCNC: 191 MG/DL (CALC)
NONHDLC SERPL-MCNC: 235 MG/DL (CALC)
RH BLD: NORMAL
TRIGL SERPL-MCNC: 236 MG/DL

## 2021-07-22 PROCEDURE — 93922 UPR/L XTREMITY ART 2 LEVELS: CPT

## 2021-07-22 PROCEDURE — VASC: Performed by: SURGERY

## 2021-08-10 ENCOUNTER — OFFICE VISIT (OUTPATIENT)
Dept: FAMILY MEDICINE CLINIC | Facility: CLINIC | Age: 71
End: 2021-08-10
Payer: COMMERCIAL

## 2021-08-10 VITALS
HEART RATE: 81 BPM | DIASTOLIC BLOOD PRESSURE: 72 MMHG | BODY MASS INDEX: 23.39 KG/M2 | WEIGHT: 137 LBS | TEMPERATURE: 96 F | OXYGEN SATURATION: 97 % | HEIGHT: 64 IN | SYSTOLIC BLOOD PRESSURE: 108 MMHG

## 2021-08-10 DIAGNOSIS — N18.32 STAGE 3B CHRONIC KIDNEY DISEASE (HCC): ICD-10-CM

## 2021-08-10 DIAGNOSIS — Z01.818 PRE-OP EXAMINATION: Primary | ICD-10-CM

## 2021-08-10 DIAGNOSIS — M16.11 OSTEOARTHRITIS OF RIGHT HIP, UNSPECIFIED OSTEOARTHRITIS TYPE: ICD-10-CM

## 2021-08-10 DIAGNOSIS — Z23 ENCOUNTER FOR IMMUNIZATION: ICD-10-CM

## 2021-08-10 DIAGNOSIS — E55.9 VITAMIN D DEFICIENCY: ICD-10-CM

## 2021-08-10 DIAGNOSIS — F33.2 SEVERE EPISODE OF RECURRENT MAJOR DEPRESSIVE DISORDER, WITHOUT PSYCHOTIC FEATURES (HCC): ICD-10-CM

## 2021-08-10 DIAGNOSIS — M81.0 AGE-RELATED OSTEOPOROSIS WITHOUT CURRENT PATHOLOGICAL FRACTURE: ICD-10-CM

## 2021-08-10 PROCEDURE — 3008F BODY MASS INDEX DOCD: CPT | Performed by: FAMILY MEDICINE

## 2021-08-10 PROCEDURE — 1160F RVW MEDS BY RX/DR IN RCRD: CPT | Performed by: NURSE PRACTITIONER

## 2021-08-10 PROCEDURE — 99214 OFFICE O/P EST MOD 30 MIN: CPT | Performed by: NURSE PRACTITIONER

## 2021-08-10 RX ORDER — AMOXICILLIN 500 MG/1
CAPSULE ORAL
COMMUNITY
Start: 2021-07-31

## 2021-08-10 NOTE — PROGRESS NOTES
Subjective:      Sydney Riddle is a 79 y o  female who presents to the office today for a preoperative consultation at the request of surgeon Dr Sergo Shaffer who plans on performing Right Hip replacement on August 25  Planned anesthesia is spinal  The patient has the following known anesthesia issues: no personal or family hx of issues with anesthesia  Patient has a bleeding risk of: no recent abnormal bleeding  Review of Systems  Review of Systems   Constitutional: Negative for chills and fever  Eyes: Negative for discharge  Respiratory: Negative for shortness of breath  Cardiovascular: Negative for chest pain  Gastrointestinal: Negative for constipation and diarrhea  Genitourinary: Negative for difficulty urinating  Musculoskeletal: Negative for joint swelling  Skin: Negative for rash  Neurological: Negative for headaches  Hematological: Negative for adenopathy  Psychiatric/Behavioral: The patient is not nervous/anxious  Objective:      Physical Exam    /72 (BP Location: Left arm, Patient Position: Sitting, Cuff Size: Adult)   Pulse 81   Temp (!) 96 °F (35 6 °C) (Temporal)   Ht 5' 4" (1 626 m)   Wt 62 1 kg (137 lb)   SpO2 97%   BMI 23 52 kg/m²     Physical Exam  Vitals and nursing note reviewed  Constitutional:       General: She is not in acute distress  Appearance: She is well-developed  She is not ill-appearing, toxic-appearing or diaphoretic  HENT:      Head: Normocephalic and atraumatic  Right Ear: External ear normal       Left Ear: External ear normal       Mouth/Throat:      Mouth: Mucous membranes are moist       Pharynx: Oropharynx is clear  No oropharyngeal exudate or posterior oropharyngeal erythema  Eyes:      General: Lids are normal          Right eye: No discharge  Left eye: No discharge  Conjunctiva/sclera: Conjunctivae normal    Cardiovascular:      Rate and Rhythm: Normal rate and regular rhythm  Heart sounds:  No murmur heard  Pulmonary:      Effort: Pulmonary effort is normal  No respiratory distress  Breath sounds: Normal breath sounds  No wheezing  Abdominal:      General: Bowel sounds are normal       Palpations: Abdomen is soft  Tenderness: There is no abdominal tenderness  Musculoskeletal:         General: No deformity  Cervical back: Normal range of motion and neck supple  Skin:     General: Skin is warm and dry  Neurological:      Mental Status: She is alert and oriented to person, place, and time  Mental status is at baseline  Psychiatric:         Speech: Speech normal          Behavior: Behavior normal          Thought Content: Thought content normal          Judgment: Judgment normal             Predictors of intubation difficulty:  Morbid obesity? no  Anatomically abnormal facies? no  Short, thick neck? no  Neck range of motion: normal    Cardiographics  ECG:   INTERPRETATION:   NORMAL SINUS RHYTHM   NONSPECIFIC T WAVE ABNORMALITY   WHEN COMPARED WITH ECG OF 30-MAR-2021 16:50,   NO SIGNIFICANT CHANGE WAS FOUND   Confirmed by Edmond SHAVER MD Nic ([id number]) on 8/3/2021 4:41:45 PM  Unable to visualize EKG- interpretation above only available  Patient asymptomatic of any cardiac symptoms  Non specific T wave abnormality present on EKG in March 2021 and June 2021  Had echo stress test done 2015 which was negative for any ischemia  Imaging  None indicated per surgeon  Lab Review   Done 8/3/21  Sed rate 33   CMP CBC PT/INR stable       Assessment:      79 y o  female with planned surgery as above  Can walk 2 blocks without difficulty: yes, other than hip pain   Can walk up 2 flights of stairs without difficulty: yes, other than hip pain      1  Pre-op examination     2  Osteoarthritis of right hip, unspecified osteoarthritis type     3  Encounter for immunization     4  Vitamin D deficiency     5   Stage 3b chronic kidney disease ([facility] )     6  Age-related osteoporosis without current pathological fracture     7  Severe episode of recurrent major depressive disorder, without psychotic features ([facility] )            Plan:        Major depression, recurrent (HCC)  Stable on Effexor  Uses ativan PRN for anxiety  Vitamin D deficiency  On supplementation      Patient is medically cleared for surgery         Diacetylmorphine, Morphine, and Pineapple - food allergy  Past Medical History:   Diagnosis Date    Malignant neoplasm without specification of site ([place] [address] ) 2004    Mild cognitive impairment      Past Surgical History:   Procedure Laterality Date    CYSTOSCOPY      Diagnostic    GASTROJEJUNOSTOMY      Percutaneous    LAPAROSCOPIC APPENDECTOMY      OTHER SURGICAL HISTORY      1)Biopsy oropharynx tonsillar fossa; 2)Chemotherapy Administration with chemoport for tonsillar cancer    TONSILLECTOMY      Cancer at age 54   [address]  [facility]      Inferior vena cava     Patient Active Problem List   Diagnosis    Depression    Fibromyalgia    Generalized anxiety disorder    Hyperlipidemia    Hypothyroidism, adult    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    Mild cognitive impairment    Osteoporosis    [facility]    Vitamin D deficiency    Major depression, recurrent ([facility] )    Stage 3b chronic kidney disease ([place] [address] )    Syncope     Social History     Socioeconomic History    Marital status: Single     Spouse name: None    Number of children: None    Years of education: None    Highest education level: None   Occupational History    Occupation: currently working   Tobacco Use    Smoking status: Former Smoker     Quit date:      Years since quittin 6    Smokeless tobacco: Never Used   Vaping Use    Vaping Use: Never used   Substance and Sexual Activity    Alcohol use: No    Drug use: No    Sexual activity: None   Other Topics Concern    None   Social History Narrative    None     Social Determinants of Health     Financial Resource Strain:     Difficulty of Paying Living Expenses:    Food Insecurity:     Worried About [address] in the Last Year:     [facility] in the Last Year:    Transportation Needs:     Lack of Transportation (Medical):  Lack of Transportation (Non-Medical):    Physical Activity:     Days of Exercise per Week:     Minutes of Exercise per Session:    Stress:     Feeling of Stress :    Social Connections:     Frequency of Communication with Friends and Family:     Frequency of Social Gatherings with Friends and Family:     Attends Anabaptist Services:     Active Member of Clubs or Organizations:     Attends Club or Organization Meetings:     Marital Status:    Intimate Partner Violence:     Fear of Current or Ex-Partner:     Emotionally Abused:     Physically Abused:     Sexually Abused:        Current Outpatient Medications:     acetaminophen (TYLENOL) 500 mg tablet, Take 1,000 mg by mouth every 8 (eight) hours, Disp: , Rfl:     amoxicillin (AMOXIL) 500 mg capsule, TAKE FOUR CAPSULES BY MOUTH 1 HOUR PRIOR TO PROCEDURE, Disp: , Rfl:     Cholecalciferol 50 MCG (2000 UT) CAPS, Take 1 capsule by mouth, Disp: , Rfl:     LORazepam (ATIVAN) 1 mg tablet, Take 1 tablet (1 mg total) by mouth 3 (three) times a day, Disp: 270 tablet, Rfl: 0    Magnesium 250 MG TABS, Take 500 mg by mouth 2 (two) times a day, Disp: , Rfl:     venlafaxine (EFFEXOR-XR) 37 5 mg 24 hr capsule, Take 2 p o   Daily, Disp: 60 capsule, Rfl: 5          Current Outpatient Medications:     acetaminophen (TYLENOL) 500 mg tablet, Take 1,000 mg by mouth every 8 (eight) hours, Disp: , Rfl:     amoxicillin (AMOXIL) 500 mg capsule, TAKE FOUR CAPSULES BY MOUTH 1 HOUR PRIOR TO PROCEDURE, Disp: , Rfl:     Cholecalciferol 50 MCG (2000 UT) CAPS, Take 1 capsule by mouth, Disp: , Rfl:     LORazepam (ATIVAN) 1 mg tablet, Take 1 tablet (1 mg total) by mouth 3 (three) times a day, Disp: 270 tablet, Rfl: 0    Magnesium 250 MG TABS, Take 500 mg by mouth 2 (two) times a day, Disp: , Rfl:     venlafaxine (EFFEXOR-XR) 37 5 mg 24 hr capsule, Take 2 p o   Daily, Disp: 60 capsule, Rfl: 5  Allergies   Allergen Reactions    Diacetylmorphine      paranoid    Morphine Other (See Comments)     "confusion & paranoid behavior"    Pineapple - Food Allergy Tongue Swelling     Tongue bleeding

## 2021-08-11 ENCOUNTER — TELEPHONE (OUTPATIENT)
Dept: ADMINISTRATIVE | Facility: OTHER | Age: 71
End: 2021-08-11

## 2021-08-11 NOTE — TELEPHONE ENCOUNTER
Upon review of the In Basket request we were able to locate, review, and update the patient chart as requested for Mammogram     Any additional questions or concerns should be emailed to the Practice Liaisons via [email or web address] email, please do not reply via In Basket      Thank you  Jay Herbert MA

## 2021-08-11 NOTE — TELEPHONE ENCOUNTER
----- Message from Malou Clark sent at 8/10/2021  3:35 PM EDT -----  08/10/21 3:35 PM    Hello, our patient Sara Penn has had Mammogram completed/performed  Please assist in updating the patient chart by pulling the Care Everywhere (CE) document  The date of service is 7/27/21       Thank you,  Malou Clark  [phone]

## 2021-09-13 ENCOUNTER — EVALUATION (OUTPATIENT)
Dept: PHYSICAL THERAPY | Facility: REHABILITATION | Age: 71
End: 2021-09-13
Payer: COMMERCIAL

## 2021-09-13 DIAGNOSIS — Z96.642 HX OF TOTAL HIP ARTHROPLASTY, LEFT: Primary | ICD-10-CM

## 2021-09-13 DIAGNOSIS — Z96.641 STATUS POST TOTAL HIP REPLACEMENT, RIGHT: ICD-10-CM

## 2021-09-13 PROCEDURE — 97110 THERAPEUTIC EXERCISES: CPT | Performed by: PHYSICAL THERAPIST

## 2021-09-13 PROCEDURE — 97162 PT EVAL MOD COMPLEX 30 MIN: CPT | Performed by: PHYSICAL THERAPIST

## 2021-09-13 NOTE — LETTER
2021    Krzysztof Shannon, [place] [address] [place] [id number]    Patient: Colby Roy   YOB: 1950   Date of Visit: 2021     Encounter Diagnosis     ICD-10-CM    1  Hx of total hip arthroplasty, left  Z96 642    2  Status post total hip replacement, right  [id number] [id number]        Dear Dr Mina Mcwilliams: Thank you for your recent referral of Colyb Roy  Please review the attached evaluation summary from Lydia's recent visit  Please verify that you agree with the plan of care by signing the attached order  If you have any questions or concerns, please do not hesitate to call  I sincerely appreciate the opportunity to share in the care of one of your patients and hope to have another opportunity to work with you in the near future  Sincerely,    Willem Osei, PT      Referring Provider:      I certify that I have read the below Plan of Care and certify the need for these services furnished under this plan of treatment while under my care  JOYCE Hicks  [address] [facility] [address]  [facility] [address]  Via Fax: [phone]          PT Evaluation     Today's date: 2021  Patient name: Colby Roy  : 1950  MRN: [id number]  Referring provider: JOYCE Matamoros  Dx:   Encounter Diagnosis     ICD-10-CM    1  Hx of total hip arthroplasty, left  Z96 642    2  Status post total hip replacement, right  Z96 641                   Assessment  Assessment details: Pt is a 78 yo female s/o a L SCOUT on  and R SCOUT Aug 25  She was in a transitional care unit until  or  and then was discharge to her home where she lives along in a multistory home  Pt did not continue her L LE program due to pain in the right hip with exercises  She notes that she is now able to care for herself but will need some help to care for her house and yard    She is currently ambulating with a rolling walker but can walk short distances without it  She presents with weakness consistent with her diagnosis and would benefit from physical therapy to enable her to resume walking for fitness and light to moderate house and yard work to enable her to maintain independence  Impairments: abnormal gait, abnormal or restricted ROM, activity intolerance, lacks appropriate home exercise program and weight-bearing intolerance    Symptom irritability: moderateUnderstanding of Dx/Px/POC: excellent  Goals  STG: in 4 weeks  Amb without assistive device in home  Able to prepare meals without difficulty  LTG: by discharge  Resume walking for fitness  Independent with HEP  Amb without assistive device  Plan  Patient would benefit from: skilled physical therapy  Planned therapy interventions: manual therapy, neuromuscular re-education, patient education, strengthening, stretching, therapeutic exercise and home exercise program  Frequency: 2x week  Duration in weeks: 10  Treatment plan discussed with: patient        Subjective Evaluation    History of Present Illness  Mechanism of injury: L hip  and R hip Aug 25  She was in a transitional care unit until  or      R hip gets really numb and achy when she sits  Pain  Current pain ratin  At worst pain ratin  Location: right hip  Aggravating factors: sitting  Progression: improved    Social Support  Stairs in house: yes   Lives in: multiple-level home  Lives with: alone    Patient Goals  Patient goal: yard work, walk in the morning, chair yoga        Objective     Observations     Additional Observation Details  Incision appears to be healing well with no redness or significant swelling        Active Range of Motion   Left Hip   Flexion: 90 degrees   Abduction: 25 degrees     Passive Range of Motion   Left Hip   Flexion: 110 degrees   Abduction: 40 degrees   External rotation (90/90): 35 degrees   Internal rotation (90/90): 35 degrees     Right Hip   Flexion: 90 degrees   Abduction: 30 degrees   External rotation (90/90): 35 degrees with pain    Strength/Myotome Testing     Left Hip   Planes of Motion   Flexion: 4  Abduction: 4  External rotation: 4  Internal rotation: 4    Right Hip   Planes of Motion   Flexion: 3+  Extension: 3+  Abduction: 3  Adduction: 3+  External rotation: 4-  Internal rotation: 4-    Precautions: tonsil cancer at age 54     Daily Treatment Diary      Assessment  9/13                     Eval/Reval                       FOTO         **         **   Manuals                                                     Exercise Diary                             standing hip abd, ext x10 ea                      marching x10                      HR/TR                        gastroc stretch                       Step ups                        romberg on foam                                                                        LAQ x10                     bridges x10                      SLR x10                                                                                                                     Modalities

## 2021-09-20 ENCOUNTER — OFFICE VISIT (OUTPATIENT)
Dept: PHYSICAL THERAPY | Facility: REHABILITATION | Age: 71
End: 2021-09-20
Payer: COMMERCIAL

## 2021-09-20 DIAGNOSIS — Z96.642 HX OF TOTAL HIP ARTHROPLASTY, LEFT: ICD-10-CM

## 2021-09-20 DIAGNOSIS — Z96.641 STATUS POST TOTAL HIP REPLACEMENT, RIGHT: Primary | ICD-10-CM

## 2021-09-20 PROCEDURE — 97112 NEUROMUSCULAR REEDUCATION: CPT

## 2021-09-20 PROCEDURE — 97110 THERAPEUTIC EXERCISES: CPT

## 2021-09-20 NOTE — PROGRESS NOTES
Daily Note     Today's date: 2021  Patient name: Lady Stokes  : 1950  MRN: [id number]  Referring provider: JOYCE Torrez  Dx:   Encounter Diagnosis     ICD-10-CM    1  Hx of total hip arthroplasty, left  Z96 642    2  Status post total hip replacement, right  Z96 641                   Subjective: Pt reports that she is doing well today with no complaints of pain in either hip  Notes that she has been semi- complaint with her HEP  Notes that she does know she needs to do better  Objective: See treatment diary below      Assessment: Tolerated treatment well  She was able to perform all newly added exercises as well with no complaints  Good functional form present but fatigued easily  Educated pt on DOMS since this was the first time she performed a full exercise program  Patient demonstrated fatigue post treatment, exhibited good technique with therapeutic exercises and would benefit from continued PT      Plan: Continue per plan of care        Precautions: tonsil cancer at age 54     Daily Treatment Diary      Assessment                     Eval/Reval                       FOTO         **         **   Manuals                                                                           Exercise Diary                             standing hip abd, ext x10 ea  x10 ea                    marching x10  x20 ea                    HR/TR    20 ea                    gastroc stretch    20"x4 ea                   Step ups    6" 10x ea                    romberg on foam    foam30"x3                                                                    LAQ x10  2x10 5"                   bridges x10  2x10 5"                    SLR x10  2x10                                                                                                                   Modalities

## 2021-09-22 ENCOUNTER — OFFICE VISIT (OUTPATIENT)
Dept: PHYSICAL THERAPY | Facility: REHABILITATION | Age: 71
End: 2021-09-22
Payer: COMMERCIAL

## 2021-09-22 DIAGNOSIS — Z96.642 HX OF TOTAL HIP ARTHROPLASTY, LEFT: Primary | ICD-10-CM

## 2021-09-22 DIAGNOSIS — Z96.641 STATUS POST TOTAL HIP REPLACEMENT, RIGHT: ICD-10-CM

## 2021-09-22 PROCEDURE — 97140 MANUAL THERAPY 1/> REGIONS: CPT | Performed by: PHYSICAL THERAPIST

## 2021-09-22 PROCEDURE — 97110 THERAPEUTIC EXERCISES: CPT | Performed by: PHYSICAL THERAPIST

## 2021-09-22 PROCEDURE — 97112 NEUROMUSCULAR REEDUCATION: CPT | Performed by: PHYSICAL THERAPIST

## 2021-09-22 NOTE — PROGRESS NOTES
Daily Note     Today's date: 2021  Patient name: Andrea Almodovar  : 1950  MRN: [id number]  Referring provider: JOYCE Pal  Dx:   Encounter Diagnosis     ICD-10-CM    1  Hx of total hip arthroplasty, left  Z96 642    2  Status post total hip replacement, right  Z96 641                   Subjective: Pt reports that she noticed a bruise on her right calf after therapy  Not sure where it came from  Also pointed out a pocket behind the incisions bilaterally that is mildly tender on the left  Objective: See treatment diary below      Assessment: Tolerated treatment well  Pt has mild edema and soft tissue restrictions over both hips right greater than left  Patient demonstrated fatigue post treatment, exhibited good technique with therapeutic exercises and would benefit from continued PT      Plan: Continue per plan of care        Precautions: tonsil cancer at age 54     Daily Treatment Diary      Assessment                   Eval/Reval                       FOTO         **         **   Manuals   STM/retrograde right post to incision      NT                  Hip PROM                                               Exercise Diary                             standing hip abd, ext x10 ea  x10 ea  2x10                  marching x10  x20 ea  x20 ea                  HR/TR    20 ea  20 ea                  gastroc stretch    20"x4 ea  20"x4                 Step ups    6" 10x ea  6" 15x ea                  romberg on foam    foam30"x3  foam 90 "                  side steps     10 ft x3                                          LAQ x10  2x10 5" 2x10 5"                 bridges x10  2x10 5"  2x10 5"                  SLR x10  2x10  2x10                  bent knee fall outs                                                                                               Modalities

## 2021-09-26 PROBLEM — Z96.643 HISTORY OF TOTAL REPLACEMENT OF BOTH HIP JOINTS: Status: ACTIVE | Noted: 2021-04-27

## 2021-09-27 ENCOUNTER — OFFICE VISIT (OUTPATIENT)
Dept: PHYSICAL THERAPY | Facility: REHABILITATION | Age: 71
End: 2021-09-27
Payer: COMMERCIAL

## 2021-09-27 DIAGNOSIS — Z96.641 STATUS POST TOTAL HIP REPLACEMENT, RIGHT: ICD-10-CM

## 2021-09-27 DIAGNOSIS — Z96.642 HX OF TOTAL HIP ARTHROPLASTY, LEFT: Primary | ICD-10-CM

## 2021-09-27 PROCEDURE — 97112 NEUROMUSCULAR REEDUCATION: CPT

## 2021-09-27 PROCEDURE — 97110 THERAPEUTIC EXERCISES: CPT

## 2021-09-27 PROCEDURE — 97140 MANUAL THERAPY 1/> REGIONS: CPT

## 2021-09-27 NOTE — PROGRESS NOTES
Daily Note     Today's date: 2021  Patient name: Homero Honeycutt  : 1950  MRN: [id number]  Referring provider: JOYCE Lewis  Dx:   Encounter Diagnosis     ICD-10-CM    1  Hx of total hip arthroplasty, left  Z96 642    2  Status post total hip replacement, right  Z96 641                   Subjective: Patient reports that both hips are feeling good today  Patient reports that her body is getting better altogether  Jhoan Balloon notes that she didn't take anything for pain for 2 days  Objective: See treatment diary below      Assessment:   Patient tolerated treatment fair secondary to reports of feeling lightheaded during standing TE  Patient was assisted to a chair and BP was taken 95/69   Held the remaining standing exercises  After a short rest/hydration the patient noted her lightheadedness had resolved  Patient performed table exercises as noted and received PROM to her R hip without reocurance of symptoms  Patient would benefit from continued PT to address deficits and attain set goals  Patient notes that she has passed out in the past when feeling lightheaded  N  Tews DPT was made aware  Plan: Continue per plan of care          Precautions: tonsil cancer at age 54     Daily Treatment Diary      Assessment                 Eval/Reval                       FOTO         **         **   Manuals   STM/retrograde right post to incision      NT                  Hip PROM        CC R hip                                       Exercise Diary     bike        5'                standing hip abd, ext x10 ea  x10 ea  2x10  2x15 ea                marching x10  x20 ea  x20 ea  2x15 ea                HR/TR    20 ea  20 ea  x20 ea                gastroc stretch    20"x4 ea  20"x4  20"x4               Step ups    6" 10x ea  6" 15x ea  np                romberg on foam    foam30"x3  foam 90 "  np                side steps     10 ft x3  np                                        LAQ x10  2x10 5" 2x10 5"  1# 2x10               bridges x10  2x10 5"  2x10 5"  2x10 5"                SLR x10  2x10  2x10  2x10                bent knee fall outs        x10                                                                                       Modalities

## 2021-09-29 ENCOUNTER — OFFICE VISIT (OUTPATIENT)
Dept: PHYSICAL THERAPY | Facility: REHABILITATION | Age: 71
End: 2021-09-29
Payer: COMMERCIAL

## 2021-09-29 DIAGNOSIS — Z96.641 STATUS POST TOTAL HIP REPLACEMENT, RIGHT: ICD-10-CM

## 2021-09-29 DIAGNOSIS — Z96.642 HX OF TOTAL HIP ARTHROPLASTY, LEFT: Primary | ICD-10-CM

## 2021-09-29 PROCEDURE — 97110 THERAPEUTIC EXERCISES: CPT

## 2021-09-29 PROCEDURE — 97140 MANUAL THERAPY 1/> REGIONS: CPT

## 2021-09-29 PROCEDURE — 97112 NEUROMUSCULAR REEDUCATION: CPT

## 2021-09-29 NOTE — PROGRESS NOTES
Daily Note     Today's date: 2021  Patient name: Colby Roy  : 1950  MRN: [id number]  Referring provider: JOYCE Matamoros  Dx:   Encounter Diagnosis     ICD-10-CM    1  Hx of total hip arthroplasty, left  Z96 642    2  Status post total hip replacement, right  Z96 641                   Subjective:  Patient reports that she feels her body is adjusting to more exercise and activity  She notes that she hasn't taken any tylenol today  Patient reported during the session that she had lost her balance during the night while going to the bathroom and thankfully she fell onto the bed  Objective: See treatment diary below      Assessment: Patient tolerated treatment well  Patient is challenged with foam balance activity- needs CG  Mayte demonstrates improving R hip ROM  Patient exhibited good technique with therapeutic exercises and would benefit from continued PT to improve R hip ROM, strength, balance and tolerance for daily activity  Plan: Continue per plan of care        Precautions: tonsil cancer at age 54     Daily Treatment Diary      Assessment               Eval/Reval                       FOTO         **  *       **   Manuals   STM/retrograde right post to incision      NT                  Hip PROM        CC R hip  CC R hip                                     Exercise Diary     bike        5'  7'              standing hip abd, ext x10 ea  x10 ea  2x10  2x15 ea  2x15 ea              marching x10  x20 ea  x20 ea  2x15 ea  2x15 ea              HR/TR    20 ea  20 ea  x20 ea  2x15 ea              gastroc stretch    20"x4 ea  20"x4  20"x4  20"x4             Step ups    6" 10x ea  6" 15x ea  np  6" x20              romberg on foam    foam30"x3  foam 90 "  np  foam 1x45"              side steps     10 ft x3  np  8ftx6 laps                                      LAQ x10  2x10 5" 2x10 5"  1# 2x10  1# 2x15             bridges x10  2x10 5"  2x10 5"  2x10 5"  2x15 5"              SLR x10  2x10  2x10  2x10  2x10              bent knee fall outs        x10  x15 5"                                                                                     Modalities

## 2021-10-05 ENCOUNTER — OFFICE VISIT (OUTPATIENT)
Dept: PHYSICAL THERAPY | Facility: REHABILITATION | Age: 71
End: 2021-10-05
Payer: COMMERCIAL

## 2021-10-05 DIAGNOSIS — Z96.641 STATUS POST TOTAL HIP REPLACEMENT, RIGHT: ICD-10-CM

## 2021-10-05 DIAGNOSIS — Z96.642 HX OF TOTAL HIP ARTHROPLASTY, LEFT: Primary | ICD-10-CM

## 2021-10-05 PROCEDURE — 97110 THERAPEUTIC EXERCISES: CPT

## 2021-10-05 PROCEDURE — 97112 NEUROMUSCULAR REEDUCATION: CPT

## 2021-10-05 PROCEDURE — 97140 MANUAL THERAPY 1/> REGIONS: CPT

## 2021-10-07 ENCOUNTER — OFFICE VISIT (OUTPATIENT)
Dept: PHYSICAL THERAPY | Facility: REHABILITATION | Age: 71
End: 2021-10-07
Payer: COMMERCIAL

## 2021-10-07 DIAGNOSIS — Z96.642 HX OF TOTAL HIP ARTHROPLASTY, LEFT: ICD-10-CM

## 2021-10-07 DIAGNOSIS — Z96.641 STATUS POST TOTAL HIP REPLACEMENT, RIGHT: Primary | ICD-10-CM

## 2021-10-07 PROCEDURE — 97110 THERAPEUTIC EXERCISES: CPT

## 2021-10-07 PROCEDURE — 97140 MANUAL THERAPY 1/> REGIONS: CPT

## 2021-10-07 PROCEDURE — 97112 NEUROMUSCULAR REEDUCATION: CPT

## 2021-10-12 ENCOUNTER — OFFICE VISIT (OUTPATIENT)
Dept: PHYSICAL THERAPY | Facility: REHABILITATION | Age: 71
End: 2021-10-12
Payer: COMMERCIAL

## 2021-10-12 DIAGNOSIS — Z96.641 STATUS POST TOTAL HIP REPLACEMENT, RIGHT: ICD-10-CM

## 2021-10-12 DIAGNOSIS — Z96.642 HX OF TOTAL HIP ARTHROPLASTY, LEFT: Primary | ICD-10-CM

## 2021-10-12 PROCEDURE — 97530 THERAPEUTIC ACTIVITIES: CPT

## 2021-10-12 PROCEDURE — 97110 THERAPEUTIC EXERCISES: CPT

## 2021-10-12 PROCEDURE — 97140 MANUAL THERAPY 1/> REGIONS: CPT

## 2021-10-12 PROCEDURE — 97112 NEUROMUSCULAR REEDUCATION: CPT

## 2021-10-14 ENCOUNTER — EVALUATION (OUTPATIENT)
Dept: PHYSICAL THERAPY | Facility: REHABILITATION | Age: 71
End: 2021-10-14
Payer: COMMERCIAL

## 2021-10-14 DIAGNOSIS — Z96.642 HX OF TOTAL HIP ARTHROPLASTY, LEFT: Primary | ICD-10-CM

## 2021-10-14 DIAGNOSIS — Z96.641 STATUS POST TOTAL HIP REPLACEMENT, RIGHT: ICD-10-CM

## 2021-10-14 PROCEDURE — 97530 THERAPEUTIC ACTIVITIES: CPT

## 2021-10-14 PROCEDURE — 97110 THERAPEUTIC EXERCISES: CPT

## 2021-10-18 ENCOUNTER — APPOINTMENT (OUTPATIENT)
Dept: PHYSICAL THERAPY | Facility: REHABILITATION | Age: 71
End: 2021-10-18
Payer: COMMERCIAL

## 2021-10-20 ENCOUNTER — APPOINTMENT (OUTPATIENT)
Dept: PHYSICAL THERAPY | Facility: REHABILITATION | Age: 71
End: 2021-10-20
Payer: COMMERCIAL

## 2021-10-25 ENCOUNTER — APPOINTMENT (OUTPATIENT)
Dept: PHYSICAL THERAPY | Facility: REHABILITATION | Age: 71
End: 2021-10-25
Payer: COMMERCIAL

## 2021-10-27 ENCOUNTER — APPOINTMENT (OUTPATIENT)
Dept: PHYSICAL THERAPY | Facility: REHABILITATION | Age: 71
End: 2021-10-27
Payer: COMMERCIAL

## 2021-11-01 DIAGNOSIS — F41.1 GENERALIZED ANXIETY DISORDER: ICD-10-CM

## 2021-11-01 RX ORDER — LORAZEPAM 1 MG/1
1 TABLET ORAL 3 TIMES DAILY
Qty: 270 TABLET | Refills: 0 | Status: SHIPPED | OUTPATIENT
Start: 2021-11-01 | End: 2022-03-03 | Stop reason: SDUPTHER

## 2021-11-03 ENCOUNTER — EVALUATION (OUTPATIENT)
Dept: PHYSICAL THERAPY | Facility: REHABILITATION | Age: 71
End: 2021-11-03
Payer: COMMERCIAL

## 2021-11-03 DIAGNOSIS — S16.1XXD STRAIN OF NECK MUSCLE, SUBSEQUENT ENCOUNTER: Primary | ICD-10-CM

## 2021-11-03 PROCEDURE — 97140 MANUAL THERAPY 1/> REGIONS: CPT | Performed by: PHYSICAL THERAPIST

## 2021-11-04 PROCEDURE — 97162 PT EVAL MOD COMPLEX 30 MIN: CPT | Performed by: PHYSICAL THERAPIST

## 2021-11-10 ENCOUNTER — OFFICE VISIT (OUTPATIENT)
Dept: PHYSICAL THERAPY | Facility: REHABILITATION | Age: 71
End: 2021-11-10
Payer: COMMERCIAL

## 2021-11-10 DIAGNOSIS — S16.1XXD STRAIN OF NECK MUSCLE, SUBSEQUENT ENCOUNTER: Primary | ICD-10-CM

## 2021-11-10 PROCEDURE — 97110 THERAPEUTIC EXERCISES: CPT

## 2021-11-10 PROCEDURE — 97140 MANUAL THERAPY 1/> REGIONS: CPT

## 2021-11-15 ENCOUNTER — OFFICE VISIT (OUTPATIENT)
Dept: PHYSICAL THERAPY | Facility: REHABILITATION | Age: 71
End: 2021-11-15
Payer: COMMERCIAL

## 2021-11-15 DIAGNOSIS — S16.1XXD STRAIN OF NECK MUSCLE, SUBSEQUENT ENCOUNTER: Primary | ICD-10-CM

## 2021-11-15 PROCEDURE — 97110 THERAPEUTIC EXERCISES: CPT | Performed by: PHYSICAL THERAPIST

## 2021-11-15 PROCEDURE — 97140 MANUAL THERAPY 1/> REGIONS: CPT | Performed by: PHYSICAL THERAPIST

## 2021-11-17 ENCOUNTER — APPOINTMENT (OUTPATIENT)
Dept: PHYSICAL THERAPY | Facility: REHABILITATION | Age: 71
End: 2021-11-17
Payer: COMMERCIAL

## 2021-11-19 DIAGNOSIS — F41.1 GENERALIZED ANXIETY DISORDER: ICD-10-CM

## 2021-11-19 DIAGNOSIS — M79.7 FIBROMYALGIA: ICD-10-CM

## 2021-11-19 DIAGNOSIS — F33.1 MODERATE EPISODE OF RECURRENT MAJOR DEPRESSIVE DISORDER (HCC): ICD-10-CM

## 2021-11-19 RX ORDER — VENLAFAXINE HYDROCHLORIDE 37.5 MG/1
CAPSULE, EXTENDED RELEASE ORAL
Qty: 60 CAPSULE | Refills: 5 | Status: SHIPPED | OUTPATIENT
Start: 2021-11-19 | End: 2022-03-22 | Stop reason: SDUPTHER

## 2021-11-22 ENCOUNTER — OFFICE VISIT (OUTPATIENT)
Dept: PHYSICAL THERAPY | Facility: REHABILITATION | Age: 71
End: 2021-11-22
Payer: COMMERCIAL

## 2021-11-22 DIAGNOSIS — S16.1XXD STRAIN OF NECK MUSCLE, SUBSEQUENT ENCOUNTER: Primary | ICD-10-CM

## 2021-11-22 PROCEDURE — 97140 MANUAL THERAPY 1/> REGIONS: CPT | Performed by: PHYSICAL THERAPIST

## 2021-11-22 PROCEDURE — 97110 THERAPEUTIC EXERCISES: CPT | Performed by: PHYSICAL THERAPIST

## 2021-11-24 ENCOUNTER — OFFICE VISIT (OUTPATIENT)
Dept: PHYSICAL THERAPY | Facility: REHABILITATION | Age: 71
End: 2021-11-24
Payer: COMMERCIAL

## 2021-11-24 DIAGNOSIS — S16.1XXD STRAIN OF NECK MUSCLE, SUBSEQUENT ENCOUNTER: Primary | ICD-10-CM

## 2021-11-24 PROCEDURE — 97140 MANUAL THERAPY 1/> REGIONS: CPT | Performed by: PHYSICAL THERAPIST

## 2021-11-24 PROCEDURE — 97110 THERAPEUTIC EXERCISES: CPT | Performed by: PHYSICAL THERAPIST

## 2021-11-29 ENCOUNTER — OFFICE VISIT (OUTPATIENT)
Dept: PHYSICAL THERAPY | Facility: REHABILITATION | Age: 71
End: 2021-11-29
Payer: COMMERCIAL

## 2021-11-29 DIAGNOSIS — S16.1XXD STRAIN OF NECK MUSCLE, SUBSEQUENT ENCOUNTER: Primary | ICD-10-CM

## 2021-11-29 PROCEDURE — 97140 MANUAL THERAPY 1/> REGIONS: CPT | Performed by: PHYSICAL THERAPIST

## 2021-11-29 PROCEDURE — 97110 THERAPEUTIC EXERCISES: CPT | Performed by: PHYSICAL THERAPIST

## 2021-12-01 ENCOUNTER — OFFICE VISIT (OUTPATIENT)
Dept: PHYSICAL THERAPY | Facility: REHABILITATION | Age: 71
End: 2021-12-01
Payer: COMMERCIAL

## 2021-12-01 DIAGNOSIS — S16.1XXD STRAIN OF NECK MUSCLE, SUBSEQUENT ENCOUNTER: Primary | ICD-10-CM

## 2021-12-01 PROCEDURE — 97110 THERAPEUTIC EXERCISES: CPT | Performed by: PHYSICAL THERAPIST

## 2021-12-01 PROCEDURE — 97140 MANUAL THERAPY 1/> REGIONS: CPT | Performed by: PHYSICAL THERAPIST

## 2021-12-06 ENCOUNTER — OFFICE VISIT (OUTPATIENT)
Dept: PHYSICAL THERAPY | Facility: REHABILITATION | Age: 71
End: 2021-12-06
Payer: COMMERCIAL

## 2021-12-06 DIAGNOSIS — S16.1XXD STRAIN OF NECK MUSCLE, SUBSEQUENT ENCOUNTER: Primary | ICD-10-CM

## 2021-12-06 PROCEDURE — 97140 MANUAL THERAPY 1/> REGIONS: CPT | Performed by: PHYSICAL THERAPIST

## 2021-12-06 PROCEDURE — 97110 THERAPEUTIC EXERCISES: CPT | Performed by: PHYSICAL THERAPIST

## 2021-12-08 ENCOUNTER — OFFICE VISIT (OUTPATIENT)
Dept: PHYSICAL THERAPY | Facility: REHABILITATION | Age: 71
End: 2021-12-08
Payer: COMMERCIAL

## 2021-12-08 DIAGNOSIS — S16.1XXD STRAIN OF NECK MUSCLE, SUBSEQUENT ENCOUNTER: Primary | ICD-10-CM

## 2021-12-08 PROCEDURE — 97140 MANUAL THERAPY 1/> REGIONS: CPT | Performed by: PHYSICAL THERAPIST

## 2021-12-08 PROCEDURE — 97110 THERAPEUTIC EXERCISES: CPT | Performed by: PHYSICAL THERAPIST

## 2021-12-13 ENCOUNTER — OFFICE VISIT (OUTPATIENT)
Dept: PHYSICAL THERAPY | Facility: REHABILITATION | Age: 71
End: 2021-12-13
Payer: COMMERCIAL

## 2021-12-13 DIAGNOSIS — S16.1XXD STRAIN OF NECK MUSCLE, SUBSEQUENT ENCOUNTER: Primary | ICD-10-CM

## 2021-12-13 PROCEDURE — 97140 MANUAL THERAPY 1/> REGIONS: CPT | Performed by: PHYSICAL THERAPIST

## 2021-12-13 PROCEDURE — 97110 THERAPEUTIC EXERCISES: CPT | Performed by: PHYSICAL THERAPIST

## 2021-12-15 ENCOUNTER — EVALUATION (OUTPATIENT)
Dept: PHYSICAL THERAPY | Facility: REHABILITATION | Age: 71
End: 2021-12-15
Payer: COMMERCIAL

## 2021-12-15 DIAGNOSIS — S16.1XXD STRAIN OF NECK MUSCLE, SUBSEQUENT ENCOUNTER: Primary | ICD-10-CM

## 2021-12-15 PROCEDURE — 97110 THERAPEUTIC EXERCISES: CPT | Performed by: PHYSICAL THERAPIST

## 2021-12-15 PROCEDURE — 97140 MANUAL THERAPY 1/> REGIONS: CPT | Performed by: PHYSICAL THERAPIST

## 2021-12-20 ENCOUNTER — OFFICE VISIT (OUTPATIENT)
Dept: PHYSICAL THERAPY | Facility: REHABILITATION | Age: 71
End: 2021-12-20
Payer: COMMERCIAL

## 2021-12-20 DIAGNOSIS — S16.1XXD STRAIN OF NECK MUSCLE, SUBSEQUENT ENCOUNTER: Primary | ICD-10-CM

## 2021-12-20 PROCEDURE — 97140 MANUAL THERAPY 1/> REGIONS: CPT | Performed by: PHYSICAL THERAPIST

## 2021-12-20 PROCEDURE — 97110 THERAPEUTIC EXERCISES: CPT | Performed by: PHYSICAL THERAPIST

## 2021-12-20 NOTE — PROGRESS NOTES
Assessment & Plan:     C09 9 - Tonsil cancer ([place] [address] )  I have evaluated the patient and found the condition to be: Resolved  The patient should continue treatment and follow-up with:  this is remote diagnosis but always want to keep it on list    N18 32 - Stage 3b chronic kidney disease ([facility] )  I have evaluated the patient and found the condition to be: Stable  I have evaluated the patient and: Recommended continue with same treatment plan    D80 2 - 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[place] [address] )  I have evaluated the patient and found the condition to be: Stable  I have evaluated the patient and: Recommended continue with same treatment plan    F33 9 - Major depression, recurrent ([place] [address] )  I have evaluated the patient and found the condition to be: Worsening  I have evaluated the patient and: Recommended continue with same treatment plan  The patient should continue treatment and follow-up with:  therapist    E03 9 - Hypothyroidism, adult  I have evaluated the patient and found the condition to be: Stable  I have evaluated the patient and: Recommended continue with same treatment plan    M81 0 - Osteoporosis  I have evaluated the patient and found the condition to be: Stable  I have evaluated the patient and: Recommended continue with same treatment plan    F32  A - Depression  I have evaluated the patient and: Recommended continue with same treatment plan    M79 7 - Fibromyalgia  I have evaluated the patient and found the condition to be: Stable  I have evaluated the patient and: Recommended continue with same treatment plan    F41 1 - Generalized anxiety disorder  I have evaluated the patient and found the condition to be: Stable  I have evaluated the patient and: Recommended continue with same treatment plan    E78 5 - Hyperlipidemia  I have evaluated the patient and found the condition to be:  Stable  I have evaluated the patient and: Recommended continue with same treatment plan    G31 84 - Mild cognitive impairment  I have evaluated the patient and found the condition to be: Stable  I have evaluated the patient and: Recommended continue with same treatment plan    E55 9 - Vitamin D deficiency  I have evaluated the patient and found the condition to be:  Stable  I have evaluated the patient and: Recommended continue with same treatment plan    R55 - Syncope  I have evaluated the patient and found the condition to be: Resolved         Subjective:     Patient ID: Amrit Zee is a 70 y o  female     Chief Complaint   Patient presents with    Follow-up     Pt does not get Flu vaccine, and she did not reciecve any COVID vaccines       HPI    Review of Systems    Objective:      /80   Pulse 88   Temp (!) 96 °F (35 6 °C) (Temporal)   Ht 5' 3 5" (1 613 m)   Wt 62 1 kg (137 lb)   SpO2 97%   BMI 23 89 kg/m²     Problem List Items Addressed This Visit        Other    Generalized anxiety disorder    Major depression, recurrent ([facility] [address] )    History of total replacement of both hip joints      Other Visit Diagnoses     Takes dietary supplements    -  Primary    Sibling relational problem              Physical Exam

## 2021-12-22 ENCOUNTER — OFFICE VISIT (OUTPATIENT)
Dept: PHYSICAL THERAPY | Facility: REHABILITATION | Age: 71
End: 2021-12-22
Payer: COMMERCIAL

## 2021-12-22 DIAGNOSIS — S16.1XXD STRAIN OF NECK MUSCLE, SUBSEQUENT ENCOUNTER: Primary | ICD-10-CM

## 2021-12-22 PROCEDURE — 97110 THERAPEUTIC EXERCISES: CPT

## 2021-12-22 PROCEDURE — 97140 MANUAL THERAPY 1/> REGIONS: CPT

## 2021-12-28 ENCOUNTER — OFFICE VISIT (OUTPATIENT)
Dept: PHYSICAL THERAPY | Facility: REHABILITATION | Age: 71
End: 2021-12-28
Payer: COMMERCIAL

## 2021-12-28 ENCOUNTER — OFFICE VISIT (OUTPATIENT)
Dept: FAMILY MEDICINE CLINIC | Facility: CLINIC | Age: 71
End: 2021-12-28

## 2021-12-28 DIAGNOSIS — F41.1 GENERALIZED ANXIETY DISORDER: ICD-10-CM

## 2021-12-28 DIAGNOSIS — Z96.643 HISTORY OF TOTAL REPLACEMENT OF BOTH HIP JOINTS: ICD-10-CM

## 2021-12-28 DIAGNOSIS — S16.1XXD STRAIN OF NECK MUSCLE, SUBSEQUENT ENCOUNTER: Primary | ICD-10-CM

## 2021-12-28 DIAGNOSIS — Z63.8 SIBLING RELATIONAL PROBLEM: ICD-10-CM

## 2021-12-28 DIAGNOSIS — Z78.9 TAKES DIETARY SUPPLEMENTS: Primary | ICD-10-CM

## 2021-12-28 DIAGNOSIS — F33.2 SEVERE EPISODE OF RECURRENT MAJOR DEPRESSIVE DISORDER, WITHOUT PSYCHOTIC FEATURES (HCC): ICD-10-CM

## 2021-12-28 PROCEDURE — 97140 MANUAL THERAPY 1/> REGIONS: CPT

## 2021-12-28 PROCEDURE — 97110 THERAPEUTIC EXERCISES: CPT

## 2021-12-28 SDOH — SOCIAL STABILITY - SOCIAL INSECURITY: OTHER SPECIFIED PROBLEMS RELATED TO PRIMARY SUPPORT GROUP: Z63.8
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
## 2021-12-30 ENCOUNTER — OFFICE VISIT (OUTPATIENT)
Dept: PHYSICAL THERAPY | Facility: REHABILITATION | Age: 71
End: 2021-12-30
Payer: COMMERCIAL

## 2021-12-30 DIAGNOSIS — S16.1XXD STRAIN OF NECK MUSCLE, SUBSEQUENT ENCOUNTER: Primary | ICD-10-CM

## 2021-12-30 PROCEDURE — 97110 THERAPEUTIC EXERCISES: CPT

## 2021-12-30 PROCEDURE — 97140 MANUAL THERAPY 1/> REGIONS: CPT

## 2022-01-01 DIAGNOSIS — F41.1 GENERALIZED ANXIETY DISORDER: ICD-10-CM

## 2022-01-01 DIAGNOSIS — E27.9 ADRENAL DISORDER (HCC): Primary | ICD-10-CM

## 2022-01-01 DIAGNOSIS — G93.89 ENCEPHALOMALACIA: ICD-10-CM

## 2022-01-01 DIAGNOSIS — G45.4 TRANSIENT GLOBAL AMNESIA: Primary | ICD-10-CM

## 2022-01-01 DIAGNOSIS — G31.84 MCI (MILD COGNITIVE IMPAIRMENT): ICD-10-CM

## 2022-01-01 DIAGNOSIS — M79.7 FIBROMYALGIA: ICD-10-CM

## 2022-01-01 DIAGNOSIS — G93.89 ENCEPHALOMALACIA: Primary | ICD-10-CM

## 2022-01-01 DIAGNOSIS — F33.1 MODERATE EPISODE OF RECURRENT MAJOR DEPRESSIVE DISORDER (HCC): ICD-10-CM

## 2022-01-01 RX ORDER — VENLAFAXINE HYDROCHLORIDE 75 MG/1
75 CAPSULE, EXTENDED RELEASE ORAL
Qty: 30 CAPSULE | Refills: 5 | Status: SHIPPED | OUTPATIENT
Start: 2022-01-01 | End: 2023-01-01

## 2022-01-01 RX ORDER — VENLAFAXINE HYDROCHLORIDE 37.5 MG/1
CAPSULE, EXTENDED RELEASE ORAL
Qty: 90 CAPSULE | Refills: 5 | Status: SHIPPED | OUTPATIENT
Start: 2022-01-01 | End: 2022-01-01 | Stop reason: SDUPTHER

## 2022-01-01 RX ORDER — VENLAFAXINE HYDROCHLORIDE 37.5 MG/1
CAPSULE, EXTENDED RELEASE ORAL
Qty: 30 CAPSULE | Refills: 5 | Status: SHIPPED | OUTPATIENT
Start: 2022-01-01 | End: 2022-01-01 | Stop reason: SDUPTHER

## 2022-01-03 ENCOUNTER — OFFICE VISIT (OUTPATIENT)
Dept: PHYSICAL THERAPY | Facility: REHABILITATION | Age: 72
End: 2022-01-03
Payer: COMMERCIAL

## 2022-01-03 DIAGNOSIS — S16.1XXD STRAIN OF NECK MUSCLE, SUBSEQUENT ENCOUNTER: Primary | ICD-10-CM

## 2022-01-03 PROCEDURE — 97140 MANUAL THERAPY 1/> REGIONS: CPT

## 2022-01-03 PROCEDURE — 97110 THERAPEUTIC EXERCISES: CPT

## 2022-01-03 NOTE — PROGRESS NOTES
Daily Note     Today's date: 1/3/2022  Patient name: Conner Beasley  : 1950  MRN: [id number]  Referring provider: Shane Guevara, PT  Dx:   Encounter Diagnosis     ICD-10-CM    1  Strain of neck muscle, subsequent encounter  S16  1XXD                   Subjective: De Paz reports that her neck has been feeling really good, just some tightness  Objective: See treatment diary below      Assessment: Patient tolerated treatment well  Patient performed ex and received manual therapy as noted  L>R scalene tightness noted  Soft tissue mobility improves during manual therapy and self stretches  Patient overall is making good improvement  Patient marshall benefit from continued PT intervention to address deficits and attain set goals  Plan: Continue per plan of care        Precautions: Depression, anxiety, SCOUT, osteoporosis     Daily Treatment Diary      Assessment 12/15 12/20 12/22 12/28 12/30 1/3   12/6  12/8  12/13   Eval/Reval                   FOTO     **         **   Manuals    STM:cervical mm NT NT CC CC CC CC   NT  NT  NT   unilat C2, C3 D/C       NT NT NT   SOR NT NT CC CC CC CC NT NT NT NT   Manual tx NT NT CC CC CC CC NT NT NT NT   Exercise Diary                          DNF 10"x10 10"x10 10"x10 5"x15 10"x10 10"x10   10"x10 10"x10 10"x10    double k-c           5"x10  10"x10  10"x10   Open books  10" x10 10"  x10 10"x10  10"  x10  10"x10    open books 10"x10  open books 10"x10  10"x10    scalene stretch    20"x3  20"x3  20"x3 20"x3  20"x3        UT stretch         10"x5          diaphragmatic breathing      2 min duirng manual l   w/heat       Levator stretch        20"x3  20"x3 20"x3 20"x3    20"x3    scapular retraction  3"x20  Pink x20 Pink x20 Pink 2x10 Pink 5" x10 Pink  2x10    PTB  PTB 5"x15  PTB x20    SB roland  5"x10       5"x10  5"x10  10"x5  5"x10    Flex roland  5"x10       5"x10  5"x10  10"x5  5"x10    ext roland  5"x10        5"x10  5"x10  10"x5  5"x10    pec stretch          5"x10 5"x10  10"x5  5"x10    TB shoulder Ext  3"x20  3"x20 Pink 3" x20 Pink 2x10  pink x10  pink  2x10            UBE 3 min  3'45" 3'/3' towel roll np  3'/3' w/ towel roll  3'/3' w/ towel roll            TB horiz abd    Pink x10 Pink  x10 np  pink  pink           Modalities    MH pre  10'  10' 10' 10'  10'  10'    10'  10'  10'

## 2022-01-05 ENCOUNTER — OFFICE VISIT (OUTPATIENT)
Dept: PHYSICAL THERAPY | Facility: REHABILITATION | Age: 72
End: 2022-01-05
Payer: COMMERCIAL

## 2022-01-05 DIAGNOSIS — S16.1XXD STRAIN OF NECK MUSCLE, SUBSEQUENT ENCOUNTER: Primary | ICD-10-CM

## 2022-01-05 PROCEDURE — 97140 MANUAL THERAPY 1/> REGIONS: CPT | Performed by: PHYSICAL THERAPIST

## 2022-01-05 PROCEDURE — 97110 THERAPEUTIC EXERCISES: CPT | Performed by: PHYSICAL THERAPIST

## 2022-01-05 NOTE — PROGRESS NOTES
Daily Note     Today's date: 2022  Patient name: Lillian Sotelo  : 1950  MRN: [id number]  Referring provider: Vicente Nguyễn, PT  Dx:   Encounter Diagnosis     ICD-10-CM    1  Strain of neck muscle, subsequent encounter  S16  1XXD                   Subjective: Riya Linares reports that this was a really good week but she was tender with SOR and STM over the erector spinae today and that concerns her  Objective: See treatment diary below      Assessment: Patient tolerated treatment well  Moderate hypertonicity persists  It reduces with STM  Pt has been spending time on the computer this week but get up frequently to stretch  Patient marshall benefit from continued PT intervention to address deficits and attain set goals  Plan: Continue per plan of care        Precautions: Depression, anxiety, SCOUT, osteoporosis     Daily Treatment Diary      Assessment 12/15 12/20 12/22 12/28 12/30 1/3 1/5    12/8  12/13   Eval/Reval                   FOTO     **         **   Manuals    STM:cervical mm NT NT CC CC CC CC NT   NT  NT   unilat C2, C3 D/C        NT NT   SOR NT NT CC CC CC CC NT  NT NT   Manual tx NT NT CC CC CC CC NT  NT NT   Exercise Diary                          DNF 10"x10 10"x10 10"x10 5"x15 10"x10 10"x10 10"x10  10"x10 10"x10    double k-c           10"x10  10"x10   Open books  10" x10 10"  x10 10"x10  10"  x10  10"x10 Thread the needle 5"x10   open books 10"x10  10"x10    scalene stretch    20"x3  20"x3  20"x3 10" x5         UT stretch                  diaphragmatic breathing      2 min duirng manual l         Levator stretch        20"x3  20"x3 20"x3     20"x3    scapular retraction  3"x20  Pink x20 Pink x20 Pink 2x10 Pink 5" x10 Pink  2x10 Pink 5" 2x10   PTB 5"x15  PTB x20    SB roland  5"x10          10"x5  5"x10    Flex roland  5"x10          10"x5  5"x10    ext roland  5"x10           10"x5  5"x10    pec stretch             10"x5  5"x10    TB shoulder Ext  3"x20  3"x20 Pink 3" x20 Pink 2x10  pink x10 pink  2x10 Pink 5" 2x10         UBE 3 min  3'45" 3'/3' towel roll np  3'/3' w/ towel roll  3'/3' w/ towel roll            TB horiz abd    Pink x10 Pink  x10 np  pink  pink  Pin         Modalities    MH pre  10'  10' 10' 10'  10'  10'  10'  10'  10'  10'

## 2022-01-10 ENCOUNTER — OFFICE VISIT (OUTPATIENT)
Dept: PHYSICAL THERAPY | Facility: REHABILITATION | Age: 72
End: 2022-01-10
Payer: COMMERCIAL

## 2022-01-10 DIAGNOSIS — S16.1XXD STRAIN OF NECK MUSCLE, SUBSEQUENT ENCOUNTER: Primary | ICD-10-CM

## 2022-01-10 PROCEDURE — 97140 MANUAL THERAPY 1/> REGIONS: CPT

## 2022-01-10 PROCEDURE — 97530 THERAPEUTIC ACTIVITIES: CPT
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
## 2022-01-13 ENCOUNTER — APPOINTMENT (OUTPATIENT)
Dept: PHYSICAL THERAPY | Facility: REHABILITATION | Age: 72
End: 2022-01-13
Payer: COMMERCIAL

## 2022-01-16 VITALS
OXYGEN SATURATION: 97 % | HEIGHT: 64 IN | HEART RATE: 88 BPM | SYSTOLIC BLOOD PRESSURE: 132 MMHG | TEMPERATURE: 96 F | DIASTOLIC BLOOD PRESSURE: 80 MMHG | BODY MASS INDEX: 23.39 KG/M2 | WEIGHT: 137 LBS

## 2022-01-17 ENCOUNTER — OFFICE VISIT (OUTPATIENT)
Dept: PHYSICAL THERAPY | Facility: REHABILITATION | Age: 72
End: 2022-01-17
Payer: COMMERCIAL

## 2022-01-17 DIAGNOSIS — S16.1XXD STRAIN OF NECK MUSCLE, SUBSEQUENT ENCOUNTER: Primary | ICD-10-CM

## 2022-01-17 PROCEDURE — 97110 THERAPEUTIC EXERCISES: CPT

## 2022-01-17 PROCEDURE — 97140 MANUAL THERAPY 1/> REGIONS: CPT

## 2022-01-17 NOTE — PROGRESS NOTES
Daily Note     Today's date: 2022  Patient name: Lesley Card  : 1950  MRN: [id number]  Referring provider: Sandro Yun, PT  Dx:   Encounter Diagnosis     ICD-10-CM    1  Strain of neck muscle, subsequent encounter  S16  1XXD                   Subjective:  Patient reports that her neck has been feeling good  She notes that she shoveled a lot this morning so she is unsure how things will feel  Objective: See treatment diary below      Assessment: Patient olerated treatment well  Patient performed ex and received manual therapy and modalities as noted   + B UT tightness noted  Patient performed a modified ex program secondary to increased physical activity earlier today  Patient responded well to treatment and noted feeling "good" post treatment  Plan: Continue per plan of care        Precautions: Depression, anxiety, SCOUT, osteoporosis     Daily Treatment Diary      Assessment 12/15 12/20 12/22 12/28 12/30 1/3 1/5  1/10  1/17    Eval/Reval                   FOTO     **            Manuals    STM:cervical mm NT NT CC CC CC CC NT CC CC    unilat C2, C3 D/C        UT str CC    SOR NT NT CC CC CC CC NT CC CC    Manual tx NT NT CC CC CC CC NT CC CC    Exercise Diary                          DNF 10"x10 10"x10 10"x10 5"x15 10"x10 10"x10 10"x10  10"x10     double k-c               Open books  10" x10 10"  x10 10"x10  10"  x10  10"x10 Thread the needle 5"x10  Thread the needle x5  ea     scalene stretch    20"x3  20"x3  20"x3 10" x5   open book 10"x10      UT stretch            10"x5 ea      diaphragmatic breathing      2 min duirng manual l         Levator stretch        20"x3  20"x3 20"x3        scapular retraction  3"x20  Pink x20 Pink x20 Pink 2x10 Pink 5" x10 Pink  2x10 Pink 5" 2x10  hold     SB roland  5"x10              Flex roland  5"x10              ext roland  5"x10               pec stretch                 TB shoulder Ext  3"x20  3"x20 Pink 3" x20 Pink 2x10  pink x10  pink  2x10 Pink 5" 2x10   hold      UBE 3 min  3'45" 3'/3' towel roll np  3'/3' w/ towel roll  3'/3' w/ towel roll      hold      TB horiz abd    Pink x10 Pink  x10 np  pink  pink  Pin    hold     Modalities    MH pre  10'  10' 10' 10'  10'  10'  10'  10' 10'

## 2022-01-20 ENCOUNTER — OFFICE VISIT (OUTPATIENT)
Dept: PHYSICAL THERAPY | Facility: REHABILITATION | Age: 72
End: 2022-01-20
Payer: COMMERCIAL

## 2022-01-20 DIAGNOSIS — S16.1XXD STRAIN OF NECK MUSCLE, SUBSEQUENT ENCOUNTER: Primary | ICD-10-CM

## 2022-01-20 PROCEDURE — 97140 MANUAL THERAPY 1/> REGIONS: CPT

## 2022-01-20 PROCEDURE — 97110 THERAPEUTIC EXERCISES: CPT

## 2022-01-20 NOTE — PROGRESS NOTES
Daily Note     Today's date: 2022  Patient name: Tutu Segal  : 1950  MRN: [id number]  Referring provider: Susana Luna, PT  Dx:   Encounter Diagnosis     ICD-10-CM    1  Strain of neck muscle, subsequent encounter  S16  1XXD                   Subjective:  Milo Uribe reports that she continues to feel sore after shoveling earlier in the week  She notes her discomfort is all L sided and she is concerned that she has regressed back 9 weeks  Objective: See treatment diary below      Assessment: Patient tolerated treatment well  Patient received modalities and manual therapy as noted followed by performing a modified ex program with emphasis on stretching  Milo Uribe reported a good response to treatment noting no pain- just some remaining tightness post treatment  Patient would benefit from continued PT intervention to address deficits and attain set goals      Plan: Continue per plan of care        Precautions: Depression, anxiety, SCOUT, osteoporosis     Daily Treatment Diary      Assessment 12/15 12/20 12/22 12/28 12/30 1/3 1/5  1/10  1/17 1/20   Eval/Reval                   FOTO     **            Manuals    STM:cervical mm NT NT CC CC CC CC NT CC CC CC   unilat C2, C3 D/C        UT str CC L UT str  CC   SOR NT NT CC CC CC CC NT CC CC CC   Manual tx NT NT CC CC CC CC NT CC CC CC   Exercise Diary                          DNF 10"x10 10"x10 10"x10 5"x15 10"x10 10"x10 10"x10  10"x10 10"x10    double k-c               Open books  10" x10 10"  x10 10"x10  10"  x10  10"x10 Thread the needle 5"x10  Thread the needle x5  ea     scalene stretch    20"x3  20"x3  20"x3 10" x5   open book 10"x10      UT stretch            10"x5 ea  10"x5 to R    diaphragmatic breathing      2 min duirng manual l         Levator stretch        20"x3  20"x3 20"x3    10"x5 to R    scapular retraction  3"x20  Pink x20 Pink x20 Pink 2x10 Pink 5" x10 Pink  2x10 Pink 5" 2x10  hold     SB roland  5"x10              Flex roland  5"x10 ext roland  5"x10               pec stretch                 TB shoulder Ext  3"x20  3"x20 Pink 3" x20 Pink 2x10  pink x10  pink  2x10 Pink 5" 2x10   hold      UBE 3 min  3'45" 3'/3' towel roll np  3'/3' w/ towel roll  3'/3' w/ towel roll      hold  3' rev    TB horiz abd    Pink x10 Pink  x10 np  pink  pink  Pin    hold     Modalities    MH pre  10'  10' 10' 10'  10'  10'  10'  10' 10'  10'

## 2022-01-24 ENCOUNTER — OFFICE VISIT (OUTPATIENT)
Dept: PHYSICAL THERAPY | Facility: REHABILITATION | Age: 72
End: 2022-01-24
Payer: COMMERCIAL

## 2022-01-24 DIAGNOSIS — S16.1XXD STRAIN OF NECK MUSCLE, SUBSEQUENT ENCOUNTER: Primary | ICD-10-CM

## 2022-01-24 PROCEDURE — 97110 THERAPEUTIC EXERCISES: CPT

## 2022-01-24 PROCEDURE — 97140 MANUAL THERAPY 1/> REGIONS: CPT

## 2022-01-24 NOTE — PROGRESS NOTES
Daily Note     Today's date: 2022  Patient name: Ml Norman  : 1950  MRN: [id number]  Referring provider: Jesusita Mclean, PT  Dx:   Encounter Diagnosis     ICD-10-CM    1  Strain of neck muscle, subsequent encounter  S16  1XXD                   Subjective:  Patient reports that her neck is feeling better than it had been for years- She notes starting medical massage and in conjunction with PT she is feeling much better  Objective: See treatment diary below      Assessment: Patient tolerated treatment well  Patient received modalities and manual therapy as noted followed by self stretching and postural strengthening ex  Patient responded well to treatment and would benefit from continued PT intervention  Plan: Continue per plan of care        Precautions: Depression, anxiety, SCOUT, osteoporosis     Daily Treatment Diary      Assessment 1/24  12/22 12/28 12/30 1/3 1/5  1/10  1/17 1/20   Eval/Reval                   FOTO     **            Manuals    STM:cervical mm UT/LS stretch B CC  CC CC CC CC NT CC CC CC   unilat C2, C3         UT str CC L UT str  CC   SOR CC  CC CC CC CC NT CC CC CC   Manual tx CC  CC CC CC CC NT CC CC CC   Exercise Diary                          DNF 10"x10  10"x10 5"x15 10"x10 10"x10 10"x10  10"x10 10"x10    double k-c               Open books   10"  x10 10"x10  10"  x10  10"x10 Thread the needle 5"x10  Thread the needle x5  ea     scalene stretch    20"x3  20"x3  20"x3 10" x5   open book 10"x10      UT stretch 10"x5 B           10"x5 ea  10"x5 to R    diaphragmatic breathing      2 min duirng manual l         Levator stretch  10"x5 B      20"x3  20"x3 20"x3    10"x5 to R    scapular retraction Pink 5' 2x10  Pink x20 Pink 2x10 Pink 5" x10 Pink  2x10 Pink 5" 2x10  hold     SB roland               Flex roland               ext roland                pec stretch                 TB shoulder Ext Pink 5: 2x10  3"x20 Pink 3" x20 Pink 2x10  pink x10  pink  2x10 Pink 5" 2x10   hold UBE Rev 4'  3'45" 3'/3' towel roll np  3'/3' w/ towel roll  3'/3' w/ towel roll      hold  3' rev    TB horiz abd    Pink x10 Pink  x10 np  pink  pink  Pin    hold     Modalities    MH pre  10'  10' 10' 10'  10'  10'  10'  10' 10'  10'

## 2022-01-27 ENCOUNTER — OFFICE VISIT (OUTPATIENT)
Dept: PHYSICAL THERAPY | Facility: REHABILITATION | Age: 72
End: 2022-01-27
Payer: COMMERCIAL

## 2022-01-27 DIAGNOSIS — S16.1XXD STRAIN OF NECK MUSCLE, SUBSEQUENT ENCOUNTER: Primary | ICD-10-CM

## 2022-01-27 PROCEDURE — 97140 MANUAL THERAPY 1/> REGIONS: CPT

## 2022-01-27 PROCEDURE — 97110 THERAPEUTIC EXERCISES: CPT

## 2022-01-27 NOTE — PROGRESS NOTES
Daily Note     Today's date: 2022  Patient name: Nicholas Jang  : 1950  MRN: [id number]  Referring provider: Francy Meng, PT  Dx:   Encounter Diagnosis     ICD-10-CM    1  Strain of neck muscle, subsequent encounter  S16  1XXD                   Subjective:  Patient reports that she is having some mild neck pain today which she attributes to having some stress  Objective: See treatment diary below  Issued an updated written HEP which was reviewed with the patient  See media  Assessment: Patient tolerated treatment well  Patient performed ex and received manual therapy as noted  Progressed resistance with postural strengthening ex and added doorway pec stretch  Patient responded well to treatment- no complaints post treatment  Plan: Continue per plan of care        Precautions: Depression, anxiety, SCOUT, osteoporosis     Daily Treatment Diary      Assessment 1/24 1/27  12/28 12/30 1/3 1/5  1/10  1/17 1/20   Eval/Reval                   FOTO     **            Manuals    STM:cervical mm UT/LS stretch B CC UT/L stretch B CC  CC CC CC NT CC CC CC   unilat C2, C3         UT str CC L UT str  CC   SOR CC CC  CC CC CC NT CC CC CC   Manual tx CC CC  CC CC CC NT CC CC CC   Exercise Diary                          DNF 10"x10   5"x15 10"x10 10"x10 10"x10  10"x10 10"x10    double k-c               Open books    10"x10  10"  x10  10"x10 Thread the needle 5"x10  Thread the needle x5  ea     scalene stretch    20"x3  20"x3  20"x3 10" x5   open book 10"x10      UT stretch 10"x5 B           10"x5 ea  10"x5 to R    diaphragmatic breathing      2 min duirng manual l         Levator stretch  10"x5 B      20"x3  20"x3 20"x3    10"x5 to R    scapular retraction Pink 5' 2x10 Green  5"x10  Pink 2x10 Pink 5" x10 Pink  2x10 Pink 5" 2x10  hold     SB roland               Flex roland               ext roland                pec stretch    10"x5             TB shoulder Ext Pink 5: 2x10 Green  5" x10  Pink 2x10  pink x10 pink  2x10 Pink 5" 2x10   hold      UBE Rev 4'  Rev x5'  np  3'/3' w/ towel roll  3'/3' w/ towel roll      hold  3' rev    TB horiz abd   Green 5" x10  np  pink  pink  Pin    hold     Modalities    MH pre  10'  10'  10'  10'  10'  10'  10' 10'  10'

## 2022-01-31 ENCOUNTER — EVALUATION (OUTPATIENT)
Dept: PHYSICAL THERAPY | Facility: REHABILITATION | Age: 72
End: 2022-01-31
Payer: COMMERCIAL

## 2022-01-31 DIAGNOSIS — S16.1XXD STRAIN OF NECK MUSCLE, SUBSEQUENT ENCOUNTER: Primary | ICD-10-CM

## 2022-01-31 PROCEDURE — 97140 MANUAL THERAPY 1/> REGIONS: CPT | Performed by: PHYSICAL THERAPIST

## 2022-01-31 PROCEDURE — 97110 THERAPEUTIC EXERCISES: CPT | Performed by: PHYSICAL THERAPIST

## 2022-01-31 NOTE — PROGRESS NOTES
Daily Note     Today's date: 2022  Patient name: Remedios Blanco  : 1950  MRN: [id number]  Referring provider: Meir Santillan, PT  Dx:   Encounter Diagnosis     ICD-10-CM    1  Strain of neck muscle, subsequent encounter  S16  1XXD                   Subjective:  Pt c/o mild left sided neck pain  She is very stressed  Her therapist cancelled her appointment this week reporting being exposed to Covid so [employer] is concerned that she may have it or be able to transmit it  She currently has not symptoms  She also having insurance stress  Objective: See treatment diary below      Assessment: Patient tolerated treatment well  Pt treated in private treatment room due to covid concerns and not all exercises were performed in the clinic today  Pt was a little sore after exercises to I had pt do diaphragmatic breathing for 5 min  This reduced neck pain  Plan: Continue per plan of care        Precautions: Depression, anxiety, SCOUT, osteoporosis     Daily Treatment Diary      Assessment 1/24 1/27 1/31   1/3 1/5  1/10  1/17 1/20   Eval/Reval                  FOTO                 Manuals    STM:cervical mm UT/LS stretch B CC UT/L stretch B CC NT   CC NT CC CC CC   unilat C2, C3         UT str CC L UT str  CC   SOR CC CC NT   CC NT CC CC CC   Manual tx CC CC NT   CC NT CC CC CC   Exercise Diary    Diaphragmatic breating   5 min                DNF 10"x10     10"x10 10"x10  10"x10 10"x10    double k-c               Open books       10"x10 Thread the needle 5"x10  Thread the needle x5  ea     scalene stretch   10"x5    20"x3 10" x5   open book 10"x10      UT stretch 10"x5 B  10"x5         10"x5 ea  10"x5 to R    diaphragmatic breathing      l         Levator stretch  10"x5 B   10"x5    20"x3 20"x3    10"x5 to R    scapular retraction Pink 5' 2x10 Green  5"x10    Pink  2x10 Pink 5" 2x10  hold     SB roland               Flex roland               ext roland               pec stretch    10"x5 20"x3           TB shoulder Ext Pink 5: 2x10 Green  5" x10     pink  2x10 Pink 5" 2x10   hold      UBE Rev 4'  Rev x5'     3'/3' w/ towel roll      hold  3' rev    TB horiz abd   Green 5" x10     pink  Pin    hold     Modalities    MH pre  10'  10' 10'    10'  10'  10' 10'  10'

## 2022-02-03 ENCOUNTER — OFFICE VISIT (OUTPATIENT)
Dept: PHYSICAL THERAPY | Facility: REHABILITATION | Age: 72
End: 2022-02-03
Payer: COMMERCIAL

## 2022-02-03 DIAGNOSIS — S16.1XXD STRAIN OF NECK MUSCLE, SUBSEQUENT ENCOUNTER: Primary | ICD-10-CM

## 2022-02-03 PROCEDURE — 97140 MANUAL THERAPY 1/> REGIONS: CPT

## 2022-02-03 PROCEDURE — 97110 THERAPEUTIC EXERCISES: CPT

## 2022-02-03 PROCEDURE — 97112 NEUROMUSCULAR REEDUCATION: CPT

## 2022-02-03 NOTE — PROGRESS NOTES
Daily Note     Today's date: 2/3/2022  Patient name: Billie Tena  : 1950  MRN: [id number]  Referring provider: Dominga Vargas PT  Dx:   Encounter Diagnosis     ICD-10-CM    1  Strain of neck muscle, subsequent encounter  S16  1XXD                   Subjective:  Patient reports that she has been feeling better recently  Physically and emotionally better  Patient overall decreased soreness in her cervical area and better movement  Objective: See treatment diary below      Assessment: Patient tolerated treatment well  Patient performed ex and received manual therapy and modalities as noted  Patient responds well to treatment and demonstrates improved postural awareness with decreasing subjective complaints  Plan: Continue per plan of care        Precautions: Depression, anxiety, SCOUT, osteoporosis     Daily Treatment Diary      Assessment 1/24 1/27 1/31 2/3  1/3 1/5  1/10  1/17 1/20   Eval/Reval                  FOTO                 Manuals    STM:cervical mm UT/LS stretch B CC UT/L stretch B CC NT UT/LS stretch CC  CC NT CC CC CC   unilat C2, C3         UT str CC L UT str  CC   SOR CC CC NT CC  CC NT CC CC CC   Manual tx CC CC NT CC  CC NT CC CC CC   Exercise Diary    Diaphragmatic breating   5 min                DNF 10"x10     10"x10 10"x10  10"x10 10"x10    double k-c               Open books       10"x10 Thread the needle 5"x10  Thread the needle x5  ea     scalene stretch   10"x5    20"x3 10" x5   open book 10"x10      UT stretch 10"x5 B  10"x5 10"x5 ea        10"x5 ea  10"x5 to R    diaphragmatic breathing      l         Levator stretch  10"x5 B   10"x5 10"x5   20"x3 20"x3    10"x5 to R    scapular retraction Pink 5' 2x10 Green  5"x10    Pink  2x10 Pink 5" 2x10  hold     SB roland               Flex roland               ext roland               pec stretch    10"x5 20"x3 20"x3          TB shoulder Ext Pink 5: 2x10 Green  5" x10  Green  5"x15   pink  2x10 Pink 5" 2x10   hold      UBE Rev 4' Rev x5'  Rev x5'   3'/3' w/ towel roll      hold  3' rev    TB horiz abd   Green 5" x10  Green5" x10   pink  Pin    hold     Modalities    MH pre  10'  10' 10' 10'   10'  10'  10' 10'  10'

## 2022-02-07 ENCOUNTER — APPOINTMENT (OUTPATIENT)
Dept: PHYSICAL THERAPY | Facility: REHABILITATION | Age: 72
End: 2022-02-07
Payer: COMMERCIAL

## 2022-02-08 ENCOUNTER — APPOINTMENT (OUTPATIENT)
Dept: PHYSICAL THERAPY | Facility: REHABILITATION | Age: 72
End: 2022-02-08
Payer: COMMERCIAL

## 2022-02-10 ENCOUNTER — OFFICE VISIT (OUTPATIENT)
Dept: PHYSICAL THERAPY | Facility: REHABILITATION | Age: 72
End: 2022-02-10
Payer: COMMERCIAL

## 2022-02-10 DIAGNOSIS — S16.1XXD STRAIN OF NECK MUSCLE, SUBSEQUENT ENCOUNTER: Primary | ICD-10-CM

## 2022-02-10 PROCEDURE — 97530 THERAPEUTIC ACTIVITIES: CPT

## 2022-02-10 PROCEDURE — 97140 MANUAL THERAPY 1/> REGIONS: CPT

## 2022-02-10 PROCEDURE — 97110 THERAPEUTIC EXERCISES: CPT

## 2022-02-10 NOTE — PROGRESS NOTES
Daily Note     Today's date: 2/10/2022  Patient name: Author Mckeon  : 1950  MRN: [id number]  Referring provider: Wilner Reynaga, PT  Dx:   Encounter Diagnosis     ICD-10-CM    1  Strain of neck muscle, subsequent encounter  S16  1XXD                   Subjective:  Sadi May reports that her neck is feeling better overall her  She notes some soreness and tenderness on the L side of her neck  Objective: See treatment diary below      Assessment: Patient tolerated treatment well  Patient received manual therapy and performed ex as noted  Education was provided during today's session regarding pain/muscle guarding associated with stress  Patient reported understanding  Plan: Continue per plan of care  Patient will continue with PT at a frequency of 1x per week        Precautions: Depression, anxiety, SCOUT, osteoporosis     Daily Treatment Diary      Assessment 1/24 1/27 1/31 2/3 2/10  1/5  1/10  1/17 1/20   Eval/Reval                  FOTO                 Manuals    STM:cervical mm UT/LS stretch B CC UT/L stretch B CC NT UT/LS stretch CC UT/LS stretch CC  NT CC CC CC   unilat C2, C3         UT str CC L UT str  CC   SOR CC CC NT CC CC  NT CC CC CC   Manual tx CC CC NT CC CC  NT CC CC CC   Exercise Diary    Diaphragmatic breating   5 min                DNF 10"x10      10"x10  10"x10 10"x10    double k-c               Open books        Thread the needle 5"x10  Thread the needle x5  ea     scalene stretch   10"x5    10" x5   open book 10"x10      UT stretch 10"x5 B  10"x5 10"x5 ea  10'x5 ea      10"x5 ea  10"x5 to R    diaphragmatic breathing      l         Levator stretch  10"x5 B   10"x5 10"x5 10"x5   20"x3    10"x5 to R    scapular retraction Pink 5' 2x10 Green  5"x10     Pink 5" 2x10  hold     SB roland               Flex roland               ext roland               pec stretch    10"x5 20"x3 20"x3          TB shoulder Ext Pink 5: 2x10 Green  5" x10  Green  5"x15   Pink 5" 2x10   hold      UBE Rev 4'  Rev x5'  Rev x5'   3'/3' w/ towel roll      hold  3' rev    TB horiz abd   Green 5" x10  Green5" x10   pink  Pin    hold     Modalities    MH pre  10'  10' 10' 10'   10'  10'  10' 10'  10'

## 2022-02-16 ENCOUNTER — OFFICE VISIT (OUTPATIENT)
Dept: PHYSICAL THERAPY | Facility: REHABILITATION | Age: 72
End: 2022-02-16
Payer: COMMERCIAL

## 2022-02-16 DIAGNOSIS — S16.1XXD STRAIN OF NECK MUSCLE, SUBSEQUENT ENCOUNTER: Primary | ICD-10-CM

## 2022-02-16 PROCEDURE — 97112 NEUROMUSCULAR REEDUCATION: CPT

## 2022-02-16 PROCEDURE — 97140 MANUAL THERAPY 1/> REGIONS: CPT

## 2022-02-16 PROCEDURE — 97110 THERAPEUTIC EXERCISES: CPT

## 2022-02-16 NOTE — PROGRESS NOTES
Daily Note     Today's date: 2022  Patient name: Lillian Sotelo  : 1950  MRN: [id number]  Referring provider: Vicente Nguyễn, PT  Dx:   Encounter Diagnosis     ICD-10-CM    1  Strain of neck muscle, subsequent encounter  S16  1XXD                   Subjective:   Patient reports that she is having some tightness and soreness in her neck L>R  Objective: See treatment diary below      Assessment: Patient tolerated treatment well  Patient performed ex and received manual therapy as noted  Patient responded well to treatment and is agreeable to progression of exercises ( as discussed with JESICA Galvez DPT) to improve strength and endurance for activity with postural cues  Patient would benefit from continued PT intervention to address deficits and attain set goals  Plan: Continue per plan of care        Precautions: Depression, anxiety, SCOUT, osteoporosis     Daily Treatment Diary      Assessment  2/3 2/10 2/16   1/10  1/17 1/20   Eval/Reval                  FOTO                 Manuals    STM:cervical mm UT/LS stretch B CC UT/L stretch B CC NT UT/LS stretch CC UT/LS stretch CC UT/LS stretch  CC  CC CC CC   unilat C2, C3         UT str CC L UT str  CC   SOR CC CC NT CC CC CC  CC CC CC   Manual tx CC CC NT CC CC CC  CC CC CC   Exercise Diary    Diaphragmatic breating   5 min                DNF 10"x10        10"x10 10"x10    double k-c               Open books          Thread the needle x5  ea     scalene stretch   10"x5       open book 10"x10      UT stretch 10"x5 B  10"x5 10"x5 ea  10'x5 ea      10"x5 ea  10"x5 to R    diaphragmatic breathing      l         Levator stretch  10"x5 B   10"x5 10"x5 10"x5       10"x5 to R    scapular retraction Pink 5' 2x10 Green  5"x10    Pink 5" 2x10   hold     SB roland               Flex roland       Bike x5' L1        ext roland               pec stretch    10"x5 20"x3 20"x3          TB shoulder Ext Pink 5: 2x10 Green  5" x10  Green  5"x15  Pink 5" 2x10 hold      UBE Rev 4'  Rev x5'  Rev x5'   3'/3' w/ towel roll      hold  3' rev    TB horiz abd   Green 5" x10  Green5" x10   pink 5" 2x10     hold     Modalities    MH pre  10'  10' 10' 10'   10'    10' 10'  10'

## 2022-02-23 ENCOUNTER — OFFICE VISIT (OUTPATIENT)
Dept: PHYSICAL THERAPY | Facility: REHABILITATION | Age: 72
End: 2022-02-23
Payer: COMMERCIAL

## 2022-02-23 DIAGNOSIS — S16.1XXD STRAIN OF NECK MUSCLE, SUBSEQUENT ENCOUNTER: Primary | ICD-10-CM

## 2022-02-23 PROCEDURE — 97140 MANUAL THERAPY 1/> REGIONS: CPT

## 2022-02-23 PROCEDURE — 97112 NEUROMUSCULAR REEDUCATION: CPT

## 2022-02-23 PROCEDURE — 97110 THERAPEUTIC EXERCISES: CPT

## 2022-02-23 NOTE — PROGRESS NOTES
Daily Note     Today's date: 2022  Patient name: Author Mckeon  : 1950  MRN: [id number]  Referring provider: Wilner Reynaga, PT  Dx:   Encounter Diagnosis     ICD-10-CM    1  Strain of neck muscle, subsequent encounter  S16  1XXD                   Subjective:  Sadi May reports that her neck has been feeling better  Objective: See treatment diary below      Assessment: Patient tolerated treatment well  Patient performed ex and received manual therapy as noted  Patient responded well to treatment and would benefit from continued PT intervention  Plan: Continue per plan of care        Precautions: Depression, anxiety, SCOUT, osteoporosis     Daily Treatment Diary      Assessment  2/3 2/10 2/16 2/23   1/17 1/20   Eval/Reval                  FOTO                 Manuals    STM:cervical mm UT/LS stretch B CC UT/L stretch B CC NT UT/LS stretch CC UT/LS stretch CC UT/LS stretch  CC UT/LS stretch CC  CC CC   unilat C2, C3         UT str CC L UT str  CC   SOR CC CC NT CC CC CC CC  CC CC   Manual tx CC CC NT CC CC CC CC  CC CC   Exercise Diary    Diaphragmatic breating   5 min                DNF 10"x10        10"x10 10"x10    double k-c               Open books          Thread the needle x5  ea     scalene stretch   10"x5       open book 10"x10      UT stretch 10"x5 B  10"x5 10"x5 ea  10'x5 ea      10"x5 ea  10"x5 to R    diaphragmatic breathing      l         Levator stretch  10"x5 B   10"x5 10"x5 10"x5       10"x5 to R    scapular retraction Pink 5' 2x10 Green  5"x10    Pink 5" 2x10   hold     SB roland               Flex roland       Bike x5' L1 Bike L1 x6'       ext roland               pec stretch    10"x5 20"x3 20"x3          TB shoulder Ext Pink 5: 2x10 Green  5" x10  Green  5"x15  Pink 5" 2x10    hold      UBE Rev 4'  Rev x5'  Rev x5'   3'/3' w/ towel roll  3'3' w/ towel roll    hold  3' rev    TB horiz abd   Green 5" x10  Green5" x10   pink 5" 2x10     hold     Modalities    MH pre  10' 10' 10' 10'   10'  10'  10'  10'

## 2022-03-02 ENCOUNTER — OFFICE VISIT (OUTPATIENT)
Dept: PHYSICAL THERAPY | Facility: REHABILITATION | Age: 72
End: 2022-03-02
Payer: COMMERCIAL

## 2022-03-02 DIAGNOSIS — S16.1XXD STRAIN OF NECK MUSCLE, SUBSEQUENT ENCOUNTER: Primary | ICD-10-CM

## 2022-03-02 PROCEDURE — 97140 MANUAL THERAPY 1/> REGIONS: CPT

## 2022-03-02 PROCEDURE — 97112 NEUROMUSCULAR REEDUCATION: CPT

## 2022-03-02 PROCEDURE — 97110 THERAPEUTIC EXERCISES: CPT

## 2022-03-02 NOTE — PROGRESS NOTES
Daily Note     Today's date: 3/2/2022  Patient name: Nicholas Jang  : 1950  MRN: [id number]  Referring provider: Francy Meng PT  Dx:   Encounter Diagnosis     ICD-10-CM    1  Strain of neck muscle, subsequent encounter  S16  1XXD                   Subjective:  Patient reports that she carried a lot of bottled water yesterday but she made sure it was distributed evenly and she moved low loads with appropriate rest breaks  Objective: See treatment diary below      Assessment: Patient tolerated treatment well  Patient performed ex and received manual therapy as noted  Minimal soft tissue tightness noted  Patient was able to progress aerobic exercise with a good tolerance  Plan: Continue per plan of care        Precautions: Depression, anxiety, SCOUT, osteoporosis     Daily Treatment Diary      Assessment 1/24 1/27 1/31 2/3 2/10 2/16 2/23 3/2  1/20   Eval/Reval                  FOTO                 Manuals    STM:cervical mm UT/LS stretch B CC UT/L stretch B CC NT UT/LS stretch CC UT/LS stretch CC UT/LS stretch  CC UT/LS stretch CC UTLS stretch  CC  CC   unilat C2, C3          L UT str  CC   SOR CC CC NT CC CC CC CC CC  CC   Manual tx CC CC NT CC CC CC CC CC  CC   Exercise Diary    Diaphragmatic breating   5 min                DNF 10"x10         10"x10    double k-c               Open books               scalene stretch   10"x5            UT stretch 10"x5 B  10"x5 10"x5 ea  10'x5 ea       10"x5 to R    diaphragmatic breathing      l         Levator stretch  10"x5 B   10"x5 10"x5 10"x5       10"x5 to R    scapular retraction Pink 5' 2x10 Green  5"x10    Pink 5" 2x10        SB roland               Flex roland       Bike x5' L1 Bike L1 x6' Bike L1  x7'      ext roland               pec stretch    10"x5 20"x3 20"x3          TB shoulder Ext Pink 5: 2x10 Green  5" x10  Green  5"x15  Pink 5" 2x10  pink       UBE Rev 4'  Rev x5'  Rev x5'   3'/3' w/ towel roll  3'3' w/ towel roll  3;/3' towel roll    3' rev TB horiz abd   Green 5" x10  Green5" x10   pink 5" 2x10   pik      Modalities    MH pre  10'  10' 10' 10'   10'  10' 10'   10'

## 2022-03-03 ENCOUNTER — TELEPHONE (OUTPATIENT)
Dept: FAMILY MEDICINE CLINIC | Facility: CLINIC | Age: 72
End: 2022-03-03

## 2022-03-03 DIAGNOSIS — F41.1 GENERALIZED ANXIETY DISORDER: ICD-10-CM

## 2022-03-03 RX ORDER — LORAZEPAM 1 MG/1
1 TABLET ORAL 3 TIMES DAILY
Qty: 270 TABLET | Refills: 0 | Status: SHIPPED | OUTPATIENT
Start: 2022-03-03 | End: 2022-06-24 | Stop reason: SDUPTHER

## 2022-03-03 NOTE — TELEPHONE ENCOUNTER
Pt is taking 2 in the am and 2 in the evening ,due to she is going through  something emotionally  Pt is requesting this be refilled early next week  Per verbal conversation she is ok for now  Pt uses [facility] in [place]    Pt's number is [phone]

## 2022-03-03 NOTE — TELEPHONE ENCOUNTER
Lalo Moreira called to let you know that [facility]  [facility]

## 2022-03-09 ENCOUNTER — OFFICE VISIT (OUTPATIENT)
Dept: PHYSICAL THERAPY | Facility: REHABILITATION | Age: 72
End: 2022-03-09
Payer: COMMERCIAL

## 2022-03-09 DIAGNOSIS — S16.1XXD STRAIN OF NECK MUSCLE, SUBSEQUENT ENCOUNTER: Primary | ICD-10-CM

## 2022-03-09 PROCEDURE — 97140 MANUAL THERAPY 1/> REGIONS: CPT

## 2022-03-09 PROCEDURE — 97112 NEUROMUSCULAR REEDUCATION: CPT

## 2022-03-09 PROCEDURE — 97110 THERAPEUTIC EXERCISES: CPT

## 2022-03-09 NOTE — PROGRESS NOTES
Daily Note     Today's date: 3/9/2022  Patient name: Remedios Blanco  : 1950  MRN: [id number]  Referring provider: Meir aSntillan PT  Dx:   Encounter Diagnosis     ICD-10-CM    1  Strain of neck muscle, subsequent encounter  S16  1XXD                   Subjective:  Patient reports that her original pain is better but she is noticing pain in different areas and it's a different type of pain  She reports that her L shoulder is making noise and sometimes she has pain in her L shoulder and she is unsure if it is coming from her neck or not  Objective: See treatment diary below      Assessment: Patient tolerated treatment well  Patient performed ex and received manual therapy as noted  Patient responded well to treatment and noted feeling good post treatment  Plan: Continue per plan of care  Patient will consider called Dr Sanchez Eid for a follow up        Precautions: Depression, anxiety, SCOUT, osteoporosis     Daily Treatment Diary      Assessment 1/24 1/27 1/31 2/3 2/10 2/16 2/23 3/2 3/9    Eval/Reval                  FOTO                 Manuals    STM:cervical mm UT/LS stretch B CC UT/L stretch B CC NT UT/LS stretch CC UT/LS stretch CC UT/LS stretch  CC UT/LS stretch CC UTLS stretch  CC CC  UT/LS stretch    unilat C2, C3             SOR CC CC NT CC CC CC CC CC CC    Manual tx CC CC NT CC CC CC CC CC CC    Exercise Diary    Diaphragmatic breating   5 min                DNF 10"x10             double k-c               Open books               scalene stretch   10"x5            UT stretch 10"x5 B  10"x5 10"x5 ea  10'x5 ea          diaphragmatic breathing      l         Levator stretch  10"x5 B   10"x5 10"x5 10"x5           scapular retraction Pink 5' 2x10 Green  5"x10    Pink 5" 2x10        SB roland               Flex roland       Bike x5' L1 Bike L1 x6' Bike L1  x7' Bike L1  7'     ext roland               pec stretch    10"x5 20"x3 20"x3          TB shoulder Ext Pink 5: 2x10 Green  5" x10  Green  5"x15 Pink 5" 2x10  Pink 5" 2x10 Pink  5"   2x10      UBE Rev 4'  Rev x5'  Rev x5'   3'/3' w/ towel roll  3'3' w/ towel roll  3;/3' towel roll  3'3' towel roll      TB horiz abd   Green 5" x10  Green5" x10   pink 5" 2x10   pink  5" 2x10 Pink  5" 2x10     Modalities    MH pre  10'  10' 10' 10'   10'  10' 10' 10'

## 2022-03-16 ENCOUNTER — OFFICE VISIT (OUTPATIENT)
Dept: PHYSICAL THERAPY | Facility: REHABILITATION | Age: 72
End: 2022-03-16
Payer: COMMERCIAL

## 2022-03-16 DIAGNOSIS — S16.1XXD STRAIN OF NECK MUSCLE, SUBSEQUENT ENCOUNTER: Primary | ICD-10-CM

## 2022-03-16 PROCEDURE — 97140 MANUAL THERAPY 1/> REGIONS: CPT | Performed by: PHYSICAL THERAPIST

## 2022-03-16 PROCEDURE — 97110 THERAPEUTIC EXERCISES: CPT | Performed by: PHYSICAL THERAPIST

## 2022-03-16 PROCEDURE — 97112 NEUROMUSCULAR REEDUCATION: CPT | Performed by: PHYSICAL THERAPIST

## 2022-03-16 NOTE — PROGRESS NOTES
Daily Note     Today's date: 3/16/2022  Patient name: Lillian Sotelo  : 1950  MRN: [id number]  Referring provider: Vicente Nguyễn PT  Dx:   Encounter Diagnosis     ICD-10-CM    1  Strain of neck muscle, subsequent encounter  S16  1XXD                   Subjective:  Patient reports doing her 3/7 exercises every day but   She went for a 15 min walk yesterday and was tired afterward  She has only been able to get for the medical massage every 2 weeks due to weather  Objective: See treatment diary below  Palpation   Left   Hypertonic in the scalenes and sternocleidomastoid  Tenderness of the cervical paraspinals, scalenes and sternocleidomastoid  Right   Hypertonic in the sternocleidomastoid  Hypotonic in the scalenes  Tenderness of the cervical paraspinals, scalenes and sternocleidomastoid  Active Range of Motion   Cervical/Thoracic Spine       Cervical    Flexion: 48 degrees   Extension: 70 degrees      Left lateral flexion: 40 degrees      Right lateral flexion: 40 degrees      Left rotation: 70 degrees  Right rotation: 75 degrees     Joint Play       Hypomobile: C7, T1, T2 and T3     Pain: C5 and C6       Tests   Cervical   Negative vertical compression, lumbar distraction, alar ligament test, Sharp-Christopher test and VBI  Left   Positive cervical flexion-rotation test         Assessment: Pt appears to have received the maximum benefit from PT at this time  Cervical ROM is WNL and she is performing all of her IADLs  She continues to experience occasional pain but it appears to be related to emotional stress  She continues to have soft tissue restrictions and is going to continue to with medical massage to address this issue  She will f/u with Dr Shanna Clayton in a month     STG: in 4 weeks  Performing Hep consistently  Pt able to arrange her pillows with a towel roll for better support when lying- met  LTG: by D/C  Pt able to go to sleep and stay asleep without difficulty- partially met  Consistently performing HEP      Plan: D/C to HEP      Precautions: Depression, anxiety, SCOUT, osteoporosis     Daily Treatment Diary      Assessment 1/24 1/27 1/31 2/3 2/10 2/16 2/23 3/2 3/9 3/16   Eval/Reval                  FOTO                 Manuals    STM:cervical mm UT/LS stretch B CC UT/L stretch B CC NT UT/LS stretch CC UT/LS stretch CC UT/LS stretch  CC UT/LS stretch CC UTLS stretch  CC CC  UT/LS stretch NT   unilat C2, C3             SOR CC CC NT CC CC CC CC CC CC NT   Manual tx CC CC NT CC CC CC CC CC CC NT   Exercise Diary    Diaphragmatic breating   5 min                DNF 10"x10             double k-c               Open books               scalene stretch   10"x5            UT stretch 10"x5 B  10"x5 10"x5 ea  10'x5 ea          diaphragmatic breathing      l         Levator stretch  10"x5 B   10"x5 10"x5 10"x5           scapular retraction Pink 5' 2x10 Green  5"x10    Pink 5" 2x10        SB roland               Flex roland       Bike x5' L1 Bike L1 x6' Bike L1  x7' Bike L1  7' Bike L1 8'    ext roland               pec stretch    10"x5 20"x3 20"x3          TB shoulder Ext Pink 5: 2x10 Green  5" x10  Green  5"x15  Pink 5" 2x10  Pink 5" 2x10 Pink  5"   2x10  Pink 5" 2x10    UBE Rev 4'  Rev x5'  Rev x5'   3'/3' w/ towel roll  3'3' w/ towel roll  3;/3' towel roll  3'3' towel roll  4'/4'    TB horiz abd   Green 5" x10  Green5" x10   pink 5" 2x10   pink  5" 2x10 Pink  5" 2x10  Pink 5" 2x10   Modalities    MH pre  10'  10' 10' 10'   10'  10' 10' 10' 10'

## 2022-03-22 DIAGNOSIS — F33.1 MODERATE EPISODE OF RECURRENT MAJOR DEPRESSIVE DISORDER (HCC): ICD-10-CM

## 2022-03-22 DIAGNOSIS — M79.7 FIBROMYALGIA: ICD-10-CM

## 2022-03-22 DIAGNOSIS — F41.1 GENERALIZED ANXIETY DISORDER: ICD-10-CM

## 2022-03-23 ENCOUNTER — HOSPITAL ENCOUNTER (OUTPATIENT)
Dept: BONE DENSITY | Facility: MEDICAL CENTER | Age: 72
Discharge: HOME | End: 2022-03-23
Payer: COMMERCIAL

## 2022-03-23 ENCOUNTER — HOSPITAL ENCOUNTER (OUTPATIENT)
Dept: MAMMOGRAPHY | Facility: MEDICAL CENTER | Age: 72
Discharge: HOME | End: 2022-03-23
Payer: COMMERCIAL

## 2022-03-23 VITALS — WEIGHT: 137 LBS | BODY MASS INDEX: 23.39 KG/M2 | HEIGHT: 64 IN

## 2022-03-23 DIAGNOSIS — Z12.31 SCREENING MAMMOGRAM, ENCOUNTER FOR: ICD-10-CM

## 2022-03-23 DIAGNOSIS — M81.0 AGE-RELATED OSTEOPOROSIS WITHOUT CURRENT PATHOLOGICAL FRACTURE: ICD-10-CM

## 2022-03-23 PROCEDURE — 77067 SCR MAMMO BI INCL CAD: CPT

## 2022-03-23 PROCEDURE — 77080 DXA BONE DENSITY AXIAL: CPT

## 2022-03-23 PROCEDURE — 77063 BREAST TOMOSYNTHESIS BI: CPT

## 2022-03-23 RX ORDER — VENLAFAXINE HYDROCHLORIDE 37.5 MG/1
CAPSULE, EXTENDED RELEASE ORAL
Qty: 60 CAPSULE | Refills: 5 | Status: SHIPPED | OUTPATIENT
Start: 2022-03-23

## 2022-05-17 ENCOUNTER — PATIENT MESSAGE (OUTPATIENT)
Dept: FAMILY MEDICINE CLINIC | Facility: CLINIC | Age: 72
End: 2022-05-17

## 2022-05-17 NOTE — TELEPHONE ENCOUNTER
I called Isadora Nicely she is aware  Pt will call if anything changes  Pt is just resting/ taking it easy  Routing  back to you as requested  Pt expressed verbal understanding

## 2022-05-24 ENCOUNTER — TELEPHONE (OUTPATIENT)
Dept: FAMILY MEDICINE CLINIC | Facility: CLINIC | Age: 72
End: 2022-05-24

## 2022-05-24 NOTE — TELEPHONE ENCOUNTER
lmom requesting Erin Michele please return the office's call so that we may schedule her an appointment

## 2022-05-31 ENCOUNTER — OFFICE VISIT (OUTPATIENT)
Dept: FAMILY MEDICINE CLINIC | Facility: CLINIC | Age: 72
End: 2022-05-31
Payer: COMMERCIAL

## 2022-05-31 VITALS
BODY MASS INDEX: 23.39 KG/M2 | WEIGHT: 137 LBS | HEART RATE: 104 BPM | TEMPERATURE: 97.2 F | OXYGEN SATURATION: 94 % | HEIGHT: 64 IN

## 2022-05-31 DIAGNOSIS — Z23 ENCOUNTER FOR IMMUNIZATION: ICD-10-CM

## 2022-05-31 DIAGNOSIS — N18.32 STAGE 3B CHRONIC KIDNEY DISEASE (HCC): ICD-10-CM

## 2022-05-31 DIAGNOSIS — G45.4 TRANSIENT GLOBAL AMNESIA: Primary | ICD-10-CM

## 2022-05-31 DIAGNOSIS — E03.9 HYPOTHYROIDISM, ADULT: ICD-10-CM

## 2022-05-31 DIAGNOSIS — G93.89 ENCEPHALOMALACIA: ICD-10-CM

## 2022-05-31 DIAGNOSIS — F33.2 SEVERE EPISODE OF RECURRENT MAJOR DEPRESSIVE DISORDER, WITHOUT PSYCHOTIC FEATURES (HCC): ICD-10-CM

## 2022-05-31 DIAGNOSIS — E55.9 VITAMIN D DEFICIENCY: ICD-10-CM

## 2022-05-31 PROCEDURE — 90677 PCV20 VACCINE IM: CPT

## 2022-05-31 PROCEDURE — 1101F PT FALLS ASSESS-DOCD LE1/YR: CPT | Performed by: FAMILY MEDICINE

## 2022-05-31 PROCEDURE — 99214 OFFICE O/P EST MOD 30 MIN: CPT | Performed by: FAMILY MEDICINE

## 2022-05-31 PROCEDURE — 90471 IMMUNIZATION ADMIN: CPT
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
## 2022-06-04 LAB
25(OH)D3 SERPL-MCNC: 54 NG/ML (ref 30–100)
ALBUMIN SERPL-MCNC: 4.4 G/DL (ref 3.6–5.1)
ALBUMIN/GLOB SERPL: 1.6 (CALC) (ref 1–2.5)
ALP SERPL-CCNC: 66 U/L (ref 37–153)
ALT SERPL-CCNC: 13 U/L (ref 6–29)
AST SERPL-CCNC: 21 U/L (ref 10–35)
BILIRUB SERPL-MCNC: 0.8 MG/DL (ref 0.2–1.2)
BUN SERPL-MCNC: 17 MG/DL (ref 7–25)
BUN/CREAT SERPL: 12 (CALC) (ref 6–22)
CALCIUM SERPL-MCNC: 10.1 MG/DL (ref 8.6–10.4)
CHLORIDE SERPL-SCNC: 100 MMOL/L (ref 98–110)
CO2 SERPL-SCNC: 33 MMOL/L (ref 20–32)
CREAT SERPL-MCNC: 1.43 MG/DL (ref 0.6–0.93)
GLOBULIN SER CALC-MCNC: 2.7 G/DL (CALC) (ref 1.9–3.7)
GLUCOSE SERPL-MCNC: 107 MG/DL (ref 65–139)
POTASSIUM SERPL-SCNC: 4 MMOL/L (ref 3.5–5.3)
PROT SERPL-MCNC: 7.1 G/DL (ref 6.1–8.1)
SL AMB EGFR AFRICAN AMERICAN: 43 ML/MIN/1.73M2
SL AMB EGFR NON AFRICAN AMERICAN: 37 ML/MIN/1.73M2
SODIUM SERPL-SCNC: 142 MMOL/L (ref 135–146)
T4 FREE SERPL-MCNC: 1.3 NG/DL (ref 0.8–1.8)
TSH SERPL-ACNC: 2.06 MIU/L (ref 0.4–4.5)
VIT B12 SERPL-MCNC: 799 PG/ML (ref 200–1100)

## 2022-06-06 ENCOUNTER — TELEPHONE (OUTPATIENT)
Dept: FAMILY MEDICINE CLINIC | Facility: CLINIC | Age: 72
End: 2022-06-06

## 2022-06-06 NOTE — TELEPHONE ENCOUNTER
I did read your note on the results page  I apologize but who is the mental health behavioral health provider she is referencing? Griselda Saenz? Let her know that although blood work is not the and all be all of diagnostic criteria, I do trust that the thyroid B12 and D being normal as well as the other lab work does point to the fact that her symptoms may not be metabolic or systemic   I am then to assume possibly it is related to her exacerbation depression, certainly impacted by her recent episode of transient global amnesia and all the other factors in her family dynamics  I know that she probably does not want to get the point where we would have to consider inpatient evaluation as was necessary a few years ago through [facility]    But certainly her health and mental health take precedence especially if she is in a exceedingly bad way

## 2022-06-06 NOTE — TELEPHONE ENCOUNTER
Patient called about 20 minutes after I spoke to her and reviewed her lab results  She wanted me to make you aware that she is extremely tired and fatigued, "she could sleep for days"    As an example, she slept as usual Saturday into Sunday, woke up at 5 AM but didn't feel she was ready to get up for the day, laid back down, and then slept until 7 PM   She is questioning what this can be caused by as her labs were normal with regards to thyroid, B12, D

## 2022-06-07 NOTE — TELEPHONE ENCOUNTER
Erin Michele returned the office's call  She has a call out to [employer]  They have to get a schedule for the provider whom offers emdr her name is [facility]UR[facility] / find out if pt is in network  pt is going to let that ride out  Pt does have an appointment with another counselor this Thursday 06/09/22 her name is Eloise Reardon whom is with [facility] on [facility] in [facility]  Phone [phone]  I informed pt of your message and recommendations  06/23/22 pt is getting a mri or ct of her brain  Pt is understanding blwk is normal  Pt expressed verbal understanding

## 2022-06-20 ENCOUNTER — TELEPHONE (OUTPATIENT)
Dept: OTHER | Facility: OTHER | Age: 72
End: 2022-06-20

## 2022-06-20 NOTE — TELEPHONE ENCOUNTER
Patient calling because she went to [facility] this morning with the printed labs that she needed and they would not accept them  I faxed all pending labs to the Fax number that she gave me [phone]-[facility]

## 2022-06-22 LAB
ADRENAL AB SER QL: NEGATIVE
ADRENAL AB TITR SER IF: NORMAL {TITER}
CORTIS AM PEAK SERPL-MCNC: 7.5 MCG/DL

## 2022-06-23 ENCOUNTER — HOSPITAL ENCOUNTER (OUTPATIENT)
Dept: MRI IMAGING | Facility: HOSPITAL | Age: 72
Discharge: HOME | End: 2022-06-23
Payer: COMMERCIAL

## 2022-06-23 DIAGNOSIS — G93.89 ENCEPHALOMALACIA: ICD-10-CM

## 2022-06-23 DIAGNOSIS — G45.4 TRANSIENT GLOBAL AMNESIA: ICD-10-CM

## 2022-06-23 PROCEDURE — 70551 MRI BRAIN STEM W/O DYE: CPT

## 2022-06-24 DIAGNOSIS — F41.1 GENERALIZED ANXIETY DISORDER: ICD-10-CM

## 2022-06-24 RX ORDER — LORAZEPAM 1 MG/1
1 TABLET ORAL 3 TIMES DAILY
Qty: 90 TABLET | Refills: 0 | Status: SHIPPED | OUTPATIENT
Start: 2022-06-24 | End: 2022-08-15 | Stop reason: SDUPTHER

## 2022-06-27 LAB — CORTIS SAL-MCNC: NORMAL MCG/DL

## 2022-07-08 ENCOUNTER — RA CDI HCC (OUTPATIENT)
Dept: OTHER | Facility: HOSPITAL | Age: 72
End: 2022-07-08

## 2022-07-08 NOTE — PROGRESS NOTES
[facility]  coding opportunities       Chart reviewed, no opportunity found: CHART REVIEWED, NO OPPORTUNITY FOUND        Patients Insurance        Commercial Insurance: [address] [place] [address]

## 2022-07-14 ENCOUNTER — OFFICE VISIT (OUTPATIENT)
Dept: FAMILY MEDICINE CLINIC | Facility: CLINIC | Age: 72
End: 2022-07-14
Payer: COMMERCIAL

## 2022-07-14 VITALS
HEIGHT: 64 IN | TEMPERATURE: 96.7 F | DIASTOLIC BLOOD PRESSURE: 62 MMHG | SYSTOLIC BLOOD PRESSURE: 112 MMHG | RESPIRATION RATE: 16 BRPM | BODY MASS INDEX: 22.91 KG/M2 | WEIGHT: 134.2 LBS | OXYGEN SATURATION: 96 % | HEART RATE: 98 BPM

## 2022-07-14 DIAGNOSIS — Z00.00 ENCOUNTER FOR ANNUAL WELLNESS EXAM IN MEDICARE PATIENT: Primary | ICD-10-CM

## 2022-07-14 DIAGNOSIS — G45.4 TRANSIENT GLOBAL AMNESIA: ICD-10-CM

## 2022-07-14 DIAGNOSIS — F33.2 SEVERE EPISODE OF RECURRENT MAJOR DEPRESSIVE DISORDER, WITHOUT PSYCHOTIC FEATURES (HCC): ICD-10-CM

## 2022-07-14 DIAGNOSIS — G31.84 MCI (MILD COGNITIVE IMPAIRMENT): ICD-10-CM

## 2022-07-14 DIAGNOSIS — G93.89 ENCEPHALOMALACIA ON IMAGING STUDY: ICD-10-CM

## 2022-07-14 PROCEDURE — 1125F AMNT PAIN NOTED PAIN PRSNT: CPT | Performed by: FAMILY MEDICINE

## 2022-07-14 PROCEDURE — 1170F FXNL STATUS ASSESSED: CPT | Performed by: FAMILY MEDICINE

## 2022-07-14 PROCEDURE — 3288F FALL RISK ASSESSMENT DOCD: CPT | Performed by: FAMILY MEDICINE

## 2022-07-14 PROCEDURE — 3725F SCREEN DEPRESSION PERFORMED: CPT | Performed by: FAMILY MEDICINE

## 2022-07-14 PROCEDURE — G0439 PPPS, SUBSEQ VISIT: HCPCS | Performed by: FAMILY MEDICINE

## 2022-07-14 PROCEDURE — 1160F RVW MEDS BY RX/DR IN RCRD: CPT | Performed by: FAMILY MEDICINE
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
## 2022-07-14 NOTE — PATIENT INSTRUCTIONS
Medicare Preventive Visit Patient Instructions  Thank you for completing your Welcome to Medicare Visit or Medicare Annual Wellness Visit today  Your next wellness visit will be due in one year (7/15/2023)  The screening/preventive services that you may require over the next 5-10 years are detailed below  Some tests may not apply to you based off risk factors and/or age  Screening tests ordered at today's visit but not completed yet may show as past due  Also, please note that scanned in results may not display below  Preventive Screenings:  Service Recommendations Previous Testing/Comments   Colorectal Cancer Screening  * Colonoscopy    * Fecal Occult Blood Test (FOBT)/Fecal Immunochemical Test (FIT)  * Fecal DNA/Cologuard Test  * Flexible Sigmoidoscopy Age: 54-65 years old   Colonoscopy: every 10 years (may be performed more frequently if at higher risk)  OR  FOBT/FIT: every 1 year  OR  Cologuard: every 3 years  OR  Sigmoidoscopy: every 5 years  Screening may be recommended earlier than age 48 if at higher risk for colorectal cancer  Also, an individualized decision between you and your healthcare provider will decide whether screening between the ages of 74-80 would be appropriate  Colonoscopy: 12/18/2017  FOBT/FIT: Not on file  Cologuard: Not on file  Sigmoidoscopy: Not on file    Screening Current     Breast Cancer Screening Age: 36 years old  Frequency: every 1-2 years  Not required if history of left and right mastectomy Mammogram: 03/23/2022    Screening Current   Cervical Cancer Screening Between the ages of 21-29, pap smear recommended once every 3 years  Between the ages of 33-67, can perform pap smear with HPV co-testing every 5 years     Recommendations may differ for women with a history of total hysterectomy, cervical cancer, or abnormal pap smears in past  Pap Smear: 02/05/2019    Screening Not Indicated   Hepatitis C Screening Once for adults born between 1945 and 1965  More frequently in patients at high risk for Hepatitis C Hep C Antibody: 01/16/2018    Screening Current   Diabetes Screening 1-2 times per year if you're at risk for diabetes or have pre-diabetes Fasting glucose: 126 mg/dL   A1C: No results in last 5 years    Screening Current   Cholesterol Screening Once every 5 years if you don't have a lipid disorder  May order more often based on risk factors  Lipid panel: 07/21/2021    Screening Not Indicated  History Lipid Disorder     Other Preventive Screenings Covered by Medicare:  1  Abdominal Aortic Aneurysm (AAA) Screening: covered once if your at risk  You're considered to be at risk if you have a family history of AAA  2  Lung Cancer Screening: covers low dose CT scan once per year if you meet all of the following conditions: (1) Age 50-69; (2) No signs or symptoms of lung cancer; (3) Current smoker or have quit smoking within the last 15 years; (4) You have a tobacco smoking history of at least 30 pack years (packs per day multiplied by number of years you smoked); (5) You get a written order from a healthcare provider  3  Glaucoma Screening: covered annually if you're considered high risk: (1) You have diabetes OR (2) Family history of glaucoma OR (3)  aged 48 and older OR (3)  American aged 72 and older  3  Osteoporosis Screening: covered every 2 years if you meet one of the following conditions: (1) You're estrogen deficient and at risk for osteoporosis based off medical history and other findings; (2) Have a vertebral abnormality; (3) On glucocorticoid therapy for more than 3 months; (4) Have primary hyperparathyroidism; (5) On osteoporosis medications and need to assess response to drug therapy  · Last bone density test (DXA Scan): 03/28/2022   5  HIV Screening: covered annually if you're between the age of 15-65  Also covered annually if you are younger than 13 and older than 72 with risk factors for HIV infection   For pregnant patients, it is covered up to 3 times per pregnancy  Immunizations:  Immunization Recommendations   Influenza Vaccine Annual influenza vaccination during flu season is recommended for all persons aged >= 6 months who do not have contraindications   Pneumococcal Vaccine (Prevnar and Pneumovax)  * Prevnar = PCV13  * Pneumovax = PPSV23   Adults 25-60 years old: 1-3 doses may be recommended based on certain risk factors  Adults 72 years old: Prevnar (PCV13) vaccine recommended followed by Pneumovax (PPSV23) vaccine  If already received PPSV23 since turning 65, then PCV13 recommended at least one year after PPSV23 dose  Hepatitis B Vaccine 3 dose series if at intermediate or high risk (ex: diabetes, end stage renal disease, liver disease)   Tetanus (Td) Vaccine - COST NOT COVERED BY MEDICARE PART B Following completion of primary series, a booster dose should be given every 10 years to maintain immunity against tetanus  Td may also be given as tetanus wound prophylaxis  Tdap Vaccine - COST NOT COVERED BY MEDICARE PART B Recommended at least once for all adults  For pregnant patients, recommended with each pregnancy  Shingles Vaccine (Shingrix) - COST NOT COVERED BY MEDICARE PART B  2 shot series recommended in those aged 48 and above     Health Maintenance Due:      Topic Date Due    Breast Cancer Screening: Mammogram  03/23/2023    Colorectal Cancer Screening  12/18/2027    Hepatitis C Screening  Completed     Immunizations Due:      Topic Date Due    COVID-19 Vaccine (1) Never done    Influenza Vaccine (1) 09/01/2022     Advance Directives   What are advance directives? Advance directives are legal documents that state your wishes and plans for medical care  These plans are made ahead of time in case you lose your ability to make decisions for yourself  Advance directives can apply to any medical decision, such as the treatments you want, and if you want to donate organs  What are the types of advance directives?   There are many types of advance directives, and each state has rules about how to use them  You may choose a combination of any of the following:  · Living will: This is a written record of the treatment you want  You can also choose which treatments you do not want, which to limit, and which to stop at a certain time  This includes surgery, medicine, IV fluid, and tube feedings  · Durable power of  for healthcare [place] [facility]): This is a written record that states who you want to make healthcare choices for you when you are unable to make them for yourself  This person, called a proxy, is usually a family member or a friend  You may choose more than 1 proxy  · Do not resuscitate (DNR) order:  A DNR order is used in case your heart stops beating or you stop breathing  It is a request not to have certain forms of treatment, such as CPR  A DNR order may be included in other types of advance directives  · Medical directive: This covers the care that you want if you are in a coma, near death, or unable to make decisions for yourself  You can list the treatments you want for each condition  Treatment may include pain medicine, surgery, blood transfusions, dialysis, IV or tube feedings, and a ventilator (breathing machine)  · Values history: This document has questions about your views, beliefs, and how you feel and think about life  This information can help others choose the care that you would choose  Why are advance directives important? An advance directive helps you control your care  Although spoken wishes may be used, it is better to have your wishes written down  Spoken wishes can be misunderstood, or not followed  Treatments may be given even if you do not want them  An advance directive may make it easier for your family to make difficult choices about your care  Fall Prevention    Fall prevention  includes ways to make your home and other areas safer   It also includes ways you can move more carefully to prevent a fall  Health conditions that cause changes in your blood pressure, vision, or muscle strength and coordination may increase your risk for falls  Medicines may also increase your risk for falls if they make you dizzy, weak, or sleepy  Fall prevention tips:   · Stand or sit up slowly  · Use assistive devices as directed  · Wear shoes that fit well and have soles that   · Wear a personal alarm  · Stay active  · Manage your medical conditions  Home Safety Tips:  · Add items to prevent falls in the bathroom  · Keep paths clear  · Install bright lights in your home  · Keep items you use often on shelves within reach  · Paint or place reflective tape on the edges of your stairs  Urinary Incontinence   Urinary incontinence (UI)  is when you lose control of your bladder  UI develops because your bladder cannot store or empty urine properly  The 3 most common types of UI are stress incontinence, urge incontinence, or both  Medicines:   · May be given to help strengthen your bladder control  Report any side effects of medication to your healthcare provider  Do pelvic muscle exercises often:  Your pelvic muscles help you stop urinating  Squeeze these muscles tight for 5 seconds, then relax for 5 seconds  Gradually work up to squeezing for 10 seconds  Do 3 sets of 15 repetitions a day, or as directed  This will help strengthen your pelvic muscles and improve bladder control  Train your bladder:  Go to the bathroom at set times, such as every 2 hours, even if you do not feel the urge to go  You can also try to hold your urine when you feel the urge to go  For example, hold your urine for 5 minutes when you feel the urge to go  As that becomes easier, hold your urine for 10 minutes  Self-care:   · Keep a UI record  Write down how often you leak urine and how much you leak  Make a note of what you were doing when you leaked urine  · Drink liquids as directed   You may need to limit the amount of liquid you drink to help control your urine leakage  Do not drink any liquid right before you go to bed  Limit or do not have drinks that contain caffeine or alcohol  · Prevent constipation  Eat a variety of high-fiber foods  Good examples are high-fiber cereals, beans, vegetables, and whole-grain breads  Walking is the best way to trigger your intestines to have a bowel movement  · Exercise regularly and maintain a healthy weight  Weight loss and exercise will decrease pressure on your bladder and help you control your leakage  · Use a catheter as directed  to help empty your bladder  A catheter is a tiny, plastic tube that is put into your bladder to drain your urine  · Go to behavior therapy as directed  Behavior therapy may be used to help you learn to control your urge to urinate  © Copyright [employer] 2018 Information is for End User's use only and may not be sold, redistributed or otherwise used for commercial purposes   All illustrations and images included in CareNotes® are the copyrighted property of A D A M , Inc  or [address] [place] [address] [employer]

## 2022-07-15 ENCOUNTER — TELEPHONE (OUTPATIENT)
Dept: NEUROLOGY | Facility: CLINIC | Age: 72
End: 2022-07-15

## 2022-07-15 NOTE — TELEPHONE ENCOUNTER
Patient calling to schedule new patient appointment for encephalomalacia  Testing done  Triage intake sent

## 2022-08-15 DIAGNOSIS — F41.1 GENERALIZED ANXIETY DISORDER: ICD-10-CM

## 2022-08-16 RX ORDER — LORAZEPAM 1 MG/1
1 TABLET ORAL 3 TIMES DAILY
Qty: 90 TABLET | Refills: 0 | Status: SHIPPED | OUTPATIENT
Start: 2022-08-16 | End: 2022-10-10 | Stop reason: SDUPTHER

## 2022-08-26 ENCOUNTER — TELEPHONE (OUTPATIENT)
Dept: FAMILY MEDICINE CLINIC | Facility: CLINIC | Age: 72
End: 2022-08-26

## 2022-08-26 NOTE — TELEPHONE ENCOUNTER
STOP  Cleanse with soap and water and leave be  Can use rubbing alcohol to CleANSE AFTER SOAP AND WATER

## 2022-08-26 NOTE — TELEPHONE ENCOUNTER
Lizet Stevens called the office in regards to she has not worn earrings for 2 years  She is trying to get earrings back in  Pt has bleeding and oozing  Pt is asking can pt make an appointment with you to heck/ try to put earrings back in? Pt is very concerned about her ear bring affected and swollen and causing poss infection  anything else to re open piercing without giving herself an infection  Pt aware you are still with pts and closing soon  Will try to follow up prior to leaving, but can not guarantee     Pt's number is [phone]

## 2022-09-27 ENCOUNTER — TELEPHONE (OUTPATIENT)
Dept: FAMILY MEDICINE CLINIC | Facility: CLINIC | Age: 72
End: 2022-09-27

## 2022-09-27 NOTE — TELEPHONE ENCOUNTER
Patient is giving permission to release information to [employer] so they can do a free home visit per [facility]

## 2022-09-30 ENCOUNTER — TELEPHONE (OUTPATIENT)
Dept: GERIATRICS | Age: 72
End: 2022-09-30

## 2022-09-30 NOTE — TELEPHONE ENCOUNTER
[facility]  [address] , [address], [address]    [phone]    Telephone Intake: Geriatric Assessment     Referral source: Hong Velasquez DO    Caller who is scheduling/relationship to pt: Aruna kwong phone number: [phone]    Reason for referral: Patient concerns  and Provider concerns regarding memory concerns, driving concerns, home safety concerns and mood concerns  If there are behavioral concerns, is the pt prescribed medications to manage these? no   If so, how many? none   Has the patient ever had an inpatient psychiatric hospitalization? Yes , 2018 with because patient stopped taking medications and was suicidal   Patient has been stable since  What is the goal of the visit? initial assessment and diagnosis; and resources  Has the patient been seen by a Neurologist or Geriatrician? Yes 2014 due Transient Amnesia  If yes, is this appointment for a second opinion? No  Has the patient ever been diagnosed with dementia? No      Preferred language? English  Highest education level? BA in communications and para-legal certificate; At least 4 years post HS  Does the patient wear glasses? Yes   Does the patient use hearing aids? Yes      Is there a living will/healthcare POA in place/If so, who? No,     Does the pt/caregiver have access for a virtual visit (computer/smart phone with audio/video)? No    Caller was informed: Please make sure the pt is accompanied by someone who knows them well / caregiver / family member to participate in this appointment  Who will accompany the pt (name and relationship)? Patient is aware she needs to have someone with her the appoitment and will find someone  Phone number of person accompanying pt: unknown    Office packet mailed out to: [id number] [phone]    Added to wait list for sooner appointments?  No    NOTE FOR : Please route to provider for chart review prior to scheduling and let the caller know that this phone intake will be reviewed IF -   Pt was recently hospitalized   Pt is prescribed medications for behavior management or has a history of psychiatric hospitalization   Pt plans to attend alone

## 2022-10-03 ENCOUNTER — TELEPHONE (OUTPATIENT)
Dept: GERIATRICS | Age: 72
End: 2022-10-03

## 2022-10-03 NOTE — TELEPHONE ENCOUNTER
Contacted patient to advise the Chief of this practice recommends patient consult with Neurology first   If our services are still needed, we will see her at previously scheduled appointment  Advised to call back if she has questions

## 2022-10-03 NOTE — TELEPHONE ENCOUNTER
Patient returned our call  Explained that she is to see Neurology first, and if neurology wants her to see us we will keep her in January, 2023  Patient expressed understanding, and will contact this office after her Neurology visit

## 2022-10-04 ENCOUNTER — IMMUNIZATIONS (OUTPATIENT)
Dept: FAMILY MEDICINE CLINIC | Facility: CLINIC | Age: 72
End: 2022-10-04
Payer: COMMERCIAL

## 2022-10-04 DIAGNOSIS — Z23 ENCOUNTER FOR IMMUNIZATION: Primary | ICD-10-CM

## 2022-10-04 PROCEDURE — G0008 ADMIN INFLUENZA VIRUS VAC: HCPCS

## 2022-10-04 PROCEDURE — 90662 IIV NO PRSV INCREASED AG IM: CPT

## 2022-10-10 ENCOUNTER — TELEPHONE (OUTPATIENT)
Dept: FAMILY MEDICINE CLINIC | Facility: CLINIC | Age: 72
End: 2022-10-10

## 2022-10-10 DIAGNOSIS — F41.1 GENERALIZED ANXIETY DISORDER: ICD-10-CM

## 2022-10-10 DIAGNOSIS — F33.1 MODERATE EPISODE OF RECURRENT MAJOR DEPRESSIVE DISORDER (HCC): ICD-10-CM

## 2022-10-10 DIAGNOSIS — M79.7 FIBROMYALGIA: ICD-10-CM

## 2022-10-10 RX ORDER — VENLAFAXINE HYDROCHLORIDE 37.5 MG/1
CAPSULE, EXTENDED RELEASE ORAL
Qty: 60 CAPSULE | Refills: 5 | Status: CANCELLED | OUTPATIENT
Start: 2022-10-10

## 2022-10-10 RX ORDER — LORAZEPAM 1 MG/1
1 TABLET ORAL 3 TIMES DAILY
Qty: 90 TABLET | Refills: 0 | Status: SHIPPED | OUTPATIENT
Start: 2022-10-10

## 2022-10-10 NOTE — TELEPHONE ENCOUNTER
Pt takes Lorazepam 1 mg tablet three times a day  Also 60 day supply if possible  [facility] [place]    Pt's number is [phone]

## 2022-10-10 NOTE — TELEPHONE ENCOUNTER
Alejandra Zavaleta called the office her Venlafaxine 37 5 mg directions have changed  She  is taking 3 capsules by mouth every day  Pt is requesting a 60 day supply if possible  [facility] [place]    Pt's number is [phone]

## 2022-10-11 ENCOUNTER — TELEPHONE (OUTPATIENT)
Dept: FAMILY MEDICINE CLINIC | Facility: CLINIC | Age: 72
End: 2022-10-11

## 2022-10-11 NOTE — TELEPHONE ENCOUNTER
[facility] pharmacy called Mayte's insurance does not cover 3 pills daily of the venlafaxine   Could you resend an script for the 37 5mg 1 daily and 75mg 1 daily to her [facility] pharmacy

## 2022-12-02 DIAGNOSIS — F41.1 GENERALIZED ANXIETY DISORDER: ICD-10-CM

## 2022-12-02 RX ORDER — LORAZEPAM 1 MG/1
1 TABLET ORAL 3 TIMES DAILY
Qty: 90 TABLET | Refills: 0 | Status: SHIPPED | OUTPATIENT
Start: 2022-12-02

## 2022-12-02 NOTE — TELEPHONE ENCOUNTER
Nan Alvarado called the office she has 7 pills left of her lorazepam 1 mg  [facility]  Pt was advised rx requests take 48-72 business hours, but is requesting it be please done today

## 2022-12-19 DIAGNOSIS — F33.1 MODERATE EPISODE OF RECURRENT MAJOR DEPRESSIVE DISORDER (HCC): ICD-10-CM

## 2022-12-19 DIAGNOSIS — M79.7 FIBROMYALGIA: ICD-10-CM

## 2022-12-19 DIAGNOSIS — F41.1 GENERALIZED ANXIETY DISORDER: ICD-10-CM

## 2022-12-19 RX ORDER — VENLAFAXINE HYDROCHLORIDE 37.5 MG/1
CAPSULE, EXTENDED RELEASE ORAL
Qty: 30 CAPSULE | Refills: 5 | Status: SHIPPED | OUTPATIENT
Start: 2022-12-19

## 2022-12-19 NOTE — TELEPHONE ENCOUNTER
Beata Chacon called the office requesting a refill of her venlafaxine 37 5 mg 24 hr capsule qty 30 to [facility] on [address]

## 2022-12-28 ENCOUNTER — TELEPHONE (OUTPATIENT)
Dept: NEUROLOGY | Facility: CLINIC | Age: 72
End: 2022-12-28

## 2022-12-28 NOTE — TELEPHONE ENCOUNTER
THE [facility] for patient to [facility] and confirm appointment with Dr Duong Craig  Gave direct number in [place]

## 2022-12-29 ENCOUNTER — CONSULT (OUTPATIENT)
Dept: NEUROLOGY | Facility: CLINIC | Age: 72
End: 2022-12-29

## 2022-12-29 VITALS
SYSTOLIC BLOOD PRESSURE: 110 MMHG | DIASTOLIC BLOOD PRESSURE: 70 MMHG | HEART RATE: 90 BPM | BODY MASS INDEX: 21.34 KG/M2 | WEIGHT: 125 LBS | HEIGHT: 64 IN

## 2022-12-29 DIAGNOSIS — G93.89 ENCEPHALOMALACIA: ICD-10-CM

## 2022-12-29 DIAGNOSIS — I69.30 CHRONIC ISCHEMIC RIGHT MCA STROKE: ICD-10-CM

## 2022-12-29 DIAGNOSIS — R40.4 ALTERED AWARENESS, TRANSIENT: Primary | ICD-10-CM

## 2022-12-29 DIAGNOSIS — E78.5 HYPERLIPIDEMIA: ICD-10-CM

## 2022-12-29 RX ORDER — ASPIRIN 81 MG/1
81 TABLET ORAL DAILY
Qty: 90 TABLET | Refills: 3 | Status: SHIPPED | OUTPATIENT
Start: 2022-12-29

## 2022-12-29 RX ORDER — ATORVASTATIN CALCIUM 40 MG/1
40 TABLET, FILM COATED ORAL DAILY
Qty: 90 TABLET | Refills: 3 | Status: SHIPPED | OUTPATIENT
Start: 2022-12-29 | End: 2023-01-17

## 2022-12-29 NOTE — PROGRESS NOTES
[address] Neurology Consult  PATIENT:  Charley Paulino  MRN:  [id number]  :  1950  DATE OF SERVICE:  2022  REFERRED BY: Stacy Santoyo DO  PMD: Romayne Chars, DO    Assessment/Plan:     Charley Paulino is a very pleasant 67 y o  female with a past medical history that includes hypothyroidism, CKD, depression, NICKOLAS, HLD who presents for episodes of altered awareness  Altered awareness  - episodes are always associated with preceding stressful situation, she can have complex purposeful motor activity with these events, and afterward will promptly return to her neurologic baseline  These features are most consistent with a diagnosis of PNES, not seizures or transient global amnesia  - will order rEEG to evaluate for interictal epileptiform activity    Chronic R MCA infarct  - discovered on MRI brain 22  Pt denies prior associated symptoms  - CTA head and neck   - A1c, lipid panel  - discussed secondary stroke prevention with aspirin and statin  Pt agreeable to ASA, but refusing statin at this time    CC:   Altered awareness    History of Present Illness:     67 y o  female with a past medical history that includes hypothyroidism, CKD, depression, NICKOLAS, HLD who presents for episodes of altered awareness  She states that these episodes have been occurring over the last 4 years  Started while she was in therapy in [facility]  First episode occurred when she was being asked about something that she didn't want to talk about  States that she "blanked out" for about 2 minutes and did not have any memory of the time during this event  She states that she was told afterward that she had gotten down from her chair, sat on the floor and was asking repetitively "where is my purse" and "how did I get here " Afterward she returned promptly back to her neurologic baseline  She was told this event was transient global amnesia  States that she believes she had 3 more of these episodes   About 1 year ago she was talking to her sister who lives in [facility] who was expressing extreme depression at that time  She had one episode while she was on the phone  Again was immediately back to her neurologic baseline  A week after that she was on her computer listening to a sermon from a particularly "fire and brimstone" Buddhist speaker, after which she had another event  She has been on medication for anxiety/depression for about 40 years  She feels like "there's so much wrong with me" and she gets overwhelmed easily  Past Medical History:     Past Medical History:   Diagnosis Date   • Malignant neoplasm without specification of site [facility]) 06/2004   • Mild cognitive impairment        Patient Active Problem List   Diagnosis   • Depression   • Fibromyalgia   • Generalized anxiety disorder   • Hyperlipidemia   • Hypothyroidism, adult   • 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 )   • Mild cognitive impairment   • Osteoporosis   • Tonsil cancer ([facility] )   • Vitamin D deficiency   • Major depression, recurrent ([facility] )   • Stage 3b chronic kidney disease ([facility] )   • Syncope   • History of total replacement of both hip joints       Medications:      Current Outpatient Medications   Medication Sig Dispense Refill   • amoxicillin (AMOXIL) 500 mg capsule TAKE FOUR CAPSULES BY MOUTH 1 HOUR PRIOR TO PROCEDURE     • Cholecalciferol 50 MCG (2000 UT) CAPS Take 1 capsule by mouth     • LORazepam (ATIVAN) 1 mg tablet Take 1 tablet (1 mg total) by mouth 3 (three) times a day 90 tablet 0   • Magnesium 250 MG TABS Take 500 mg by mouth 2 (two) times a day     • venlafaxine (EFFEXOR-XR) 37 5 mg 24 hr capsule ONE PO daily with a 75MG dose 30 capsule 5   • venlafaxine (EFFEXOR-XR) 75 mg 24 hr capsule Take 1 capsule (75 mg total) by mouth daily with breakfast With additional 37 5 mg 30 capsule 5     No current facility-administered medications for this visit  Allergies:       Allergies   Allergen Reactions   • Diacetylmorphine      paranoid   • Morphine Other (See Comments)     "confusion & paranoid behavior"   • Pineapple - Food Allergy Tongue Swelling     Tongue bleeding       Family History:     Family History   Problem Relation Age of Onset   • Breast cancer Mother         unspecified laterality       • Bone cancer Family    • No Known Problems Sister    • No Known Problems Maternal Grandmother    • No Known Problems Paternal Grandmother    • No Known Problems Sister    • No Known Problems Maternal Aunt    • No Known Problems Paternal Aunt    • No Known Problems Paternal [de-identified]    • Melanoma Father    • Cancer Maternal Grandfather         unknown where   • No Known Problems Paternal Grandfather    • Bone cancer Other 29       Social History:       Social History     Socioeconomic History   • Marital status: Single     Spouse name: Not on file   • Number of children: Not on file   • Years of education: Not on file   • Highest education level: Not on file   Occupational History   • Occupation: currently working   Tobacco Use   • Smoking status: Former     Types: Cigarettes     Quit date:      Years since quittin 0   • Smokeless tobacco: Never   Vaping Use   • Vaping Use: Never used   Substance and Sexual Activity   • Alcohol use: No   • Drug use: No   • Sexual activity: Not on file   Other Topics Concern   • Not on file   Social History Narrative   • Not on file     Social Determinants of Health     Financial Resource Strain: Not on file   Food Insecurity: Not on file   Transportation Needs: Not on file   Physical Activity: Not on file   Stress: Not on file   Social Connections: Not on file   Intimate Partner Violence: Not on file   Housing Stability: Not on file         Objective:   /70 (BP Location: Left arm, Patient Position: Sitting, Cuff Size: Adult)   Pulse 90   Ht 5' 3 5" (1 613 m)   Wt 56 7 kg (125 lb)   BMI 21 80 kg/m²     General: Patient is not in any acute/apparent distress, well nourished, well developed and cooperative  HEENT: normocephalic, atraumatic, moist membranes  Neck: supple  Extremities: no edema noted   Skin: no lesions or rash  Musculosketal: no bony abnormalities    Neurologic Examination:   Mental status: alert, awake, oriented X 3 and following commands  Speech/Language: Speech is fluent without any dysarthria, no aphasia noted, can name, repeat, read and write and comprehension intact    Cranial Nerves:   CN I: smell not tested  CN II: Visual fields full to confrontation  CN III, IV, VI: Extraocular movements intact bilaterally  Pupils equal round and reactive to light bilaterally  CN V: Facial sensation is normal   CN VII: Full and symmetric facial movement  CN VIII: Hearing is normal   CN IX, X: Palate elevates symmetrically  CN XI: Shoulder shrug strength is normal   CN XII: Tongue midline without atrophy or fasciculations  Motor:   Strength 5/5 in all 4 extremities  Bulk/tone - normal   Fasiculations - none    Sensory:   Sensation intact to soft touch in all 4 extremities  Cerebellar:   Finger-to-nose intact    Reflexes: 2+ in all 4 extremities    Gait:   Normal gait, Romberg sign negative     Imaging:   MRI brain 6/23/22  FINDINGS:     BRAIN PARENCHYMA:  There is no discrete mass, mass effect or midline shift  There is no intracranial hemorrhage  There is no evidence of acute infarction and diffusion imaging is unremarkable  Stable remote posterior right MCA distribution infarct with   encephalomalacia and gliosis in the right parietal lobe  Associated laminar necrosis  Chronic lacunar infarct in the left cerebellum  T2/FLAIR hyperintensity in the periventricular and subcortical white matter likely represents mild chronic   microangiopathy  No restricted diffusion to indicate acute infarct  No acute hemorrhage, mass, mass effect, shift or herniation      VENTRICLES:   Mild ex vacuo dilatation of the atria of the right lateral ventricle    Within normal limits for patient age      SELLA AND PITUITARY GLAND:  Normal      ORBITS:  Normal      PARANASAL SINUSES:  Normal      VASCULATURE:  Evaluation of the major intracranial vasculature demonstrates appropriate flow voids      CALVARIUM AND SKULL BASE: Stable opacification of inferior right mastoid air cells  Calvarium otherwise unremarkable       EXTRACRANIAL SOFT TISSUES:  Normal      IMPRESSION:     No acute intracranial pathology  Stable chronic right MCA infarct  Mild chronic microangiopathy           Review of Systems:     Review of Systems   Constitutional: Negative  Negative for appetite change and fever  HENT: Negative  Negative for hearing loss, tinnitus, trouble swallowing and voice change  Eyes: Negative  Negative for photophobia, pain and visual disturbance  Respiratory: Negative  Negative for shortness of breath  Cardiovascular: Negative  Negative for palpitations  Gastrointestinal: Negative  Negative for nausea and vomiting  Endocrine: Negative  Negative for cold intolerance  Genitourinary: Negative  Negative for dysuria, frequency and urgency  Musculoskeletal: Negative  Negative for gait problem, myalgias and neck pain  Skin: Negative  Negative for rash  Allergic/Immunologic: Negative  Neurological: Negative  Negative for dizziness, tremors, seizures, syncope, facial asymmetry, speech difficulty, weakness, light-headedness, numbness and headaches  Hematological: Negative  Does not bruise/bleed easily  Psychiatric/Behavioral: Positive for agitation  Negative for confusion, hallucinations and sleep disturbance  The patient is nervous/anxious  I have spent 60 minutes with Patient today in which greater than 50% of this time was spent in counseling/coordination of care regarding Risks and benefits of tx options, Intructions for management, Patient and family education, Importance of tx compliance, Risk factor reductions and Impressions   I also spent 20 minutes non face to face for this patient the same day

## 2022-12-29 NOTE — PATIENT INSTRUCTIONS
Patient Instructions:  -routine EEG  -please schedule your CTA head and neck  -start aspirin 81 mg daily   -please obtain lipid panel and A1c  -follow up in 6 months

## 2023-01-01 DIAGNOSIS — I72.0 ANEURYSM OF RIGHT CAROTID ARTERY (HCC): ICD-10-CM

## 2023-01-01 DIAGNOSIS — I65.23 CAROTID STENOSIS, ASYMPTOMATIC, BILATERAL: Primary | ICD-10-CM

## 2023-01-04 ENCOUNTER — TELEPHONE (OUTPATIENT)
Dept: NEUROLOGY | Facility: CLINIC | Age: 73
End: 2023-01-04

## 2023-01-04 NOTE — TELEPHONE ENCOUNTER
Patient called the [facility] phone number to confirm the phone number she had written down at home  Explained to patient that she had called the [facility] where she had seen Dr Rosemary Valentine for an office visit  Patient requested to be mailed Dr Magalis Whalen business cards  Address of patient confirmed and cards sent to her via mail

## 2023-01-05 ENCOUNTER — TELEPHONE (OUTPATIENT)
Dept: NEUROLOGY | Facility: CLINIC | Age: 73
End: 2023-01-05

## 2023-01-05 NOTE — TELEPHONE ENCOUNTER
Pt left VM re: missed phone call  Transcribed VM:   Yes, this is Loretta Millard how p as in [facility] left a message on, on this phone number a little while ago and somebody is calling me back on [phone]  And I was on the phone, I apologize, I missed your phone call  If you could give me a call back, I'm still here  Oh, by my phone  Sorry about that  Um again is [phone]  Well if you for calling to talking to Dr Kisha Arce staff, thank you  Cosmo collier

## 2023-01-05 NOTE — TELEPHONE ENCOUNTER
pt left vm stating that dr bailey scheduled 3 tests for her, CTA, lipd panel, renal function panel   states that CS called her this am and she doesn't understand her question but they want to make sure that the renal function panel that she will have done tomorrow is ok with the CTA contrast     CTA is scheduled for 1/14  [phone]    Left message for pt advising that the lab she was questioning is appropriate for her test that is scheduled on 1/14

## 2023-01-07 LAB
ALBUMIN SERPL-MCNC: 4.2 G/DL (ref 3.6–5.1)
BUN SERPL-MCNC: 18 MG/DL (ref 7–25)
BUN/CREAT SERPL: 14 (CALC) (ref 6–22)
CALCIUM SERPL-MCNC: 9.9 MG/DL (ref 8.6–10.4)
CHLORIDE SERPL-SCNC: 100 MMOL/L (ref 98–110)
CHOLEST SERPL-MCNC: 371 MG/DL
CHOLEST/HDLC SERPL: 5.3 (CALC)
CO2 SERPL-SCNC: 33 MMOL/L (ref 20–32)
CREAT SERPL-MCNC: 1.3 MG/DL (ref 0.6–1)
EST. AVERAGE GLUCOSE BLD GHB EST-MCNC: 105 MG/DL
EST. AVERAGE GLUCOSE BLD GHB EST-SCNC: 5.8 MMOL/L
GFR/BSA.PRED SERPLBLD CYS-BASED-ARV: 44 ML/MIN/1.73M2
GLUCOSE SERPL-MCNC: 91 MG/DL (ref 65–99)
HBA1C MFR BLD: 5.3 % OF TOTAL HGB
HDLC SERPL-MCNC: 70 MG/DL
LDLC SERPL CALC-MCNC: 278 MG/DL (CALC)
NONHDLC SERPL-MCNC: 301 MG/DL (CALC)
PHOSPHATE SERPL-MCNC: 3.7 MG/DL (ref 2.1–4.3)
POTASSIUM SERPL-SCNC: 3.9 MMOL/L (ref 3.5–5.3)
SODIUM SERPL-SCNC: 140 MMOL/L (ref 135–146)
TRIGL SERPL-MCNC: 103 MG/DL

## 2023-01-14 ENCOUNTER — HOSPITAL ENCOUNTER (OUTPATIENT)
Dept: CT IMAGING | Facility: HOSPITAL | Age: 73
Discharge: HOME | End: 2023-01-14
Attending: STUDENT IN AN ORGANIZED HEALTH CARE EDUCATION/TRAINING PROGRAM

## 2023-01-14 DIAGNOSIS — I69.30 CHRONIC ISCHEMIC RIGHT MCA STROKE: ICD-10-CM

## 2023-01-14 RX ADMIN — IOHEXOL 85 ML: 350 INJECTION, SOLUTION INTRAVENOUS at 12:15

## 2023-01-17 ENCOUNTER — CONSULT (OUTPATIENT)
Age: 73
End: 2023-01-17

## 2023-01-17 VITALS
RESPIRATION RATE: 20 BRPM | OXYGEN SATURATION: 95 % | SYSTOLIC BLOOD PRESSURE: 110 MMHG | HEIGHT: 65 IN | TEMPERATURE: 97.5 F | BODY MASS INDEX: 21.99 KG/M2 | HEART RATE: 101 BPM | WEIGHT: 132 LBS | DIASTOLIC BLOOD PRESSURE: 72 MMHG

## 2023-01-17 DIAGNOSIS — F33.2 SEVERE EPISODE OF RECURRENT MAJOR DEPRESSIVE DISORDER, WITHOUT PSYCHOTIC FEATURES (HCC): Chronic | ICD-10-CM

## 2023-01-17 DIAGNOSIS — G31.84 MILD COGNITIVE IMPAIRMENT: Primary | ICD-10-CM

## 2023-01-17 RX ORDER — VENLAFAXINE HYDROCHLORIDE 37.5 MG/1
CAPSULE, EXTENDED RELEASE ORAL
Qty: 30 CAPSULE | Refills: 5
Start: 2023-01-17

## 2023-01-17 RX ORDER — LORAZEPAM 1 MG/1
1 TABLET ORAL 2 TIMES DAILY
Qty: 60 TABLET | Refills: 0
Start: 2023-01-17 | End: 2023-01-23 | Stop reason: SDUPTHER

## 2023-01-17 NOTE — ASSESSMENT & PLAN NOTE
As above, unclear if venlafaxine is providing significant therapeutic benefit  As well, standing lorazepam is not a therapy for MDD and may lead to physiologic dependence and increased risks for developing dementia  Would recommend tapering off of lorazepam and either altering SNRI therapy to another SSRI/SNRI agent or consideration of switching from lorazepam to another more appropriate adjunctive agent such as buspirone, aripiprazole or bupropion  Patient did not want me to adjust medications today and did not want any follow up with our office  I recommended consideration of 6 month follow up for repeat MoCA and reassessment of mood    Would recommend consideration of psychiatry referral
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
## 2023-01-17 NOTE — PATIENT INSTRUCTIONS
If you are experiencing a mental health emergency, please call 911 for emergent care OR call one of the following numbers for someone to talk to 24 hours a day, 7 days a week:    [facility]: [address] [place] [address]: [address] [place] [address] St: [address], [place]: [address] [place] [address] Road: [phone]    C/M/P: [phone]    Additional Resources:    CrisisTextLine: Text PA to [phone]  Suicide Prevention Lifeline: [phone]

## 2023-01-17 NOTE — PROGRESS NOTES
ASSESSMENT AND PLAN:  1  Mild cognitive impairment  Assessment & Plan:  Diagnosis is difficult to ascertain considering lack of supportive historian  Patient does have abnormal MoCA score 21/30 and likely best described as mild cognitive impairment (MCI)  There are multiple possible etiologies of MCI  Patient does have a history of CVA although unclear as to whether this is a cause of current symptoms or not  As well patient has clear significant severe depression symptoms which may be contributing to her cognitive impairment  As well patient continues with lorazepam twice a day every day  Chronic benzodiazepine use has been associated with increased risks of development of dementia and may impair cognitive functioning directly  For cognitive health I would recommend stopping standing lorazepam   Standing benzodiazepine therapy is not recommended for depression or anxiety  Does not seem that venlafaxine has been particularly effective  I would recommend consideration of either altering SNRI therapy to another SSRI/SNRI agent or consideration of switching from lorazepam to another more appropriate adjunctive agent such as buspirone, aripiprazole or bupropion  Decision-making capacity: Appears intact, but she may have impairment for complex medical or financial decisions considering deficits in MoCA testing today  Would recommend neuropsychology evaluation for further determination  Staging: Mild Cognitive Impairment (MCI)    Medications Review: Reviewed medications  Current prescribed medications with risks due to chronic benzodiazepine use  Recommend tapering off altogether and consider alternative therapy  As well patient has numerous supplements  Would recommend decreasing pill burden and likely unnecessary supplements  Orders:  -     Ambulatory Referral to [facility]    2   Severe episode of recurrent major depressive disorder, without psychotic features [facility])  Assessment & Plan:  As above, unclear if venlafaxine is providing significant therapeutic benefit  As well, standing lorazepam is not a therapy for MDD and may lead to physiologic dependence and increased risks for developing dementia  Would recommend tapering off of lorazepam and either altering SNRI therapy to another SSRI/SNRI agent or consideration of switching from lorazepam to another more appropriate adjunctive agent such as buspirone, aripiprazole or bupropion  Patient did not want me to adjust medications today and did not want any follow up with our office  I recommended consideration of 6 month follow up for repeat MoCA and reassessment of mood  Would recommend consideration of psychiatry referral     Orders:  -     venlafaxine (EFFEXOR-XR) 37 5 mg 24 hr capsule; 2 tablets daily (75 mg total)  -     LORazepam (ATIVAN) 1 mg tablet; Take 1 tablet (1 mg total) by mouth 2 (two) times a day      HPI:    We had the pleasure of evaluating Brittany Rodriguez who is a 67 y o  female in Geriatric consultation today  Ms Candida Cavazos is in the office with her hairdresser [facility]  She lives alone  Per patient, she reports [place] had suggested increasing venlafaxine and didn't feel better with 75mg in AM and 37 5mg in evening and then went back to 37 5mg twice a day as the 75 mg pill looked similar to the amoxicillin and didn't want to mix them up  She reports seeing therapist Osman Course  HISTORY AS PER friend Nataly:  She reports Evelia Noe had a "mental breakdown" in her hair appointment and she offered her help  Reports that she has repeated stories to her when cutting her hair  Sometimes forgets what she was talking about mid-conversation  She cuts her hair at her place, things appear generally clean and in order        Family member with dementia and what type? unknown  History of head trauma: unknown  History of stroke: Yes, per medical chart  History of alcohol or substance abuse: unknown    FUNCTIONAL STATUS:  BADLs  Does patient require assistance with:  hairdresser unsure    IADLs  Dose patient require assistance with:  hairdresser unsure    NEUROPSYCH SYMPTOMS:  Unclear  No knowledgable independent historian available    SAFETY:  Hearing and vision issue: No  Any gait or balance disorder: No  Uses: no assistive devices  Does patient drive: Yes  Any concerns about patient's ability to drive: No , and damaris followed patient in the car today    Allergies   Allergen Reactions   • Diacetylmorphine      paranoid   • Morphine Other (See Comments)     "confusion & paranoid behavior"   • Pineapple - Food Allergy Tongue Swelling     Tongue bleeding       Medications:    Current Outpatient Medications on File Prior to Visit   Medication Sig Dispense Refill   • amoxicillin (AMOXIL) 500 mg capsule TAKE FOUR CAPSULES BY MOUTH 1 HOUR PRIOR TO PROCEDURE     • aspirin (ECOTRIN LOW STRENGTH) 81 mg EC tablet Take 1 tablet (81 mg total) by mouth daily 90 tablet 3   • Cholecalciferol 50 MCG (2000 UT) CAPS Take 1 capsule by mouth     • Magnesium 250 MG TABS Take 500 mg by mouth 2 (two) times a day     • [DISCONTINUED] LORazepam (ATIVAN) 1 mg tablet Take 1 tablet (1 mg total) by mouth 3 (three) times a day (Patient taking differently: Take 1 mg by mouth 3 (three) times a day 2 times a day) 90 tablet 0   • [DISCONTINUED] venlafaxine (EFFEXOR-XR) 37 5 mg 24 hr capsule ONE PO daily with a 75MG dose (Patient taking differently: 2 tablets daily (75 mg total)) 30 capsule 5   • [DISCONTINUED] atorvastatin (LIPITOR) 40 mg tablet Take 1 tablet (40 mg total) by mouth daily (Patient not taking: Reported on 1/17/2023) 90 tablet 3   • [DISCONTINUED] venlafaxine (EFFEXOR-XR) 75 mg 24 hr capsule Take 1 capsule (75 mg total) by mouth daily with breakfast With additional 37 5 mg (Patient not taking: Reported on 1/17/2023) 30 capsule 5     No current facility-administered medications on file prior to visit         History:  Past Medical History:   Diagnosis Date   • Malignant neoplasm without specification of site [facility]) 2004   • Mild cognitive impairment      Past Surgical History:   Procedure Laterality Date   • CYSTOSCOPY      Diagnostic   • GASTROJEJUNOSTOMY      Percutaneous   • LAPAROSCOPIC APPENDECTOMY     • OTHER SURGICAL HISTORY      1)Biopsy oropharynx tonsillar fossa; 2)Chemotherapy Administration with chemoport for tonsillar cancer   • TONSILLECTOMY      Cancer at age 54   • VENA CAVA RECONSTRUCTION      Inferior vena cava     Family History   Problem Relation Age of Onset   • Breast cancer Mother         unspecified laterality       • Bone cancer Family    • No Known Problems Sister    • No Known Problems Maternal Grandmother    • No Known Problems Paternal Grandmother    • No Known Problems Sister    • No Known Problems Maternal Aunt    • No Known Problems Paternal Aunt    • No Known Problems Paternal [de-identified]    • Melanoma Father    • Cancer Maternal Grandfather         unknown where   • No Known Problems Paternal Grandfather    • Bone cancer Other 29     Social History     Socioeconomic History   • Marital status: Single     Spouse name: Not on file   • Number of children: Not on file   • Years of education: Not on file   • Highest education level: Not on file   Occupational History   • Occupation: currently working   Tobacco Use   • Smoking status: Former     Types: Cigarettes     Quit date:      Years since quittin 0   • Smokeless tobacco: Never   Vaping Use   • Vaping Use: Never used   Substance and Sexual Activity   • Alcohol use: No   • Drug use: No   • Sexual activity: Not on file   Other Topics Concern   • Not on file   Social History Narrative   • Not on file     Social Determinants of Health     Financial Resource Strain: Not on file   Food Insecurity: Not on file   Transportation Needs: Not on file   Physical Activity: Not on file   Stress: Not on file   Social Connections: Not on file   Intimate Partner Violence: Not on file   Housing Stability: Not on file     Past Surgical History:   Procedure Laterality Date   • CYSTOSCOPY      Diagnostic   • GASTROJEJUNOSTOMY      Percutaneous   • LAPAROSCOPIC APPENDECTOMY     • OTHER SURGICAL HISTORY      1)Biopsy oropharynx tonsillar fossa; 2)Chemotherapy Administration with chemoport for tonsillar cancer   • TONSILLECTOMY      Cancer at age 54   • VENA CAVA RECONSTRUCTION      Inferior vena cava       OBJECTIVE:  Vitals:    01/17/23 1404   BP: 110/72   BP Location: Left arm   Patient Position: Sitting   Cuff Size: Standard   Pulse: 101   Resp: 20   Temp: 97 5 °F (36 4 °C)   TempSrc: Temporal   SpO2: 95%   Weight: 59 9 kg (132 lb)   Height: 5' 4 76" (1 645 m)     Body mass index is 22 13 kg/m²  Physical Exam  Constitutional:       General: She is not in acute distress  Appearance: She is well-developed  She is not diaphoretic  HENT:      Head: Normocephalic and atraumatic  Eyes:      General: No scleral icterus  Conjunctiva/sclera: Conjunctivae normal    Cardiovascular:      Rate and Rhythm: Normal rate and regular rhythm  Heart sounds: Normal heart sounds  Pulmonary:      Effort: Pulmonary effort is normal  No respiratory distress  Breath sounds: Normal breath sounds  Abdominal:      General: Bowel sounds are normal  There is no distension  Palpations: Abdomen is soft  Musculoskeletal:         General: No deformity  Skin:     General: Skin is warm and dry  Findings: No rash  Neurological:      General: No focal deficit present  Mental Status: She is alert and oriented to person, place, and time  Gait: Gait normal    Psychiatric:      Comments: Depressed mood  Crying during visit  Very defensive thought processes           MoCA: 22/30  Geriatric Depression Screen    Flowsheet Row Most Recent Value   Total Score (max 30) 13 (P)       Depression Screening Follow-up Plan: Patient's depression screening was positive with a geriatric depression screen (GDS) score of 13  I will make recommendations regarding pharmacologic therapy  As well she will continue active relationship with psychological services        Labs & Imaging:  Lab Results   Component Value Date    WBC 5 15 06/09/2021    HGB 9 9 (L) 06/09/2021    HCT 31 5 (L) 06/09/2021    MCV 95 06/09/2021     06/09/2021     Lab Results   Component Value Date     12/16/2017    SODIUM 140 01/06/2023    K 3 9 01/06/2023     01/06/2023    CO2 33 (H) 01/06/2023    ANIONGAP 8 04/10/2015    AGAP 7 06/09/2021    BUN 18 01/06/2023    CREATININE 1 30 (H) 01/06/2023    GLUC 91 01/06/2023    GLUF 126 (H) 12/04/2018    CALCIUM 9 9 01/06/2023    AST 21 06/03/2022    ALT 13 06/03/2022    ALKPHOS 66 06/03/2022    PROT 7 0 12/16/2017    TP 7 1 06/03/2022    BILITOT 0 4 12/16/2017    TBILI 0 8 06/03/2022    EGFR 44 (L) 01/06/2023     Lab Results   Component Value Date    HGBA1C 5 3 01/06/2023     Lab Results   Component Value Date    CHOLESTEROL 371 (H) 01/06/2023    CHOLESTEROL 293 (H) 07/21/2021    CHOLESTEROL 312 (H) 11/18/2020     Lab Results   Component Value Date    HDL 70 01/06/2023    HDL 58 07/21/2021    HDL 70 11/18/2020     Lab Results   Component Value Date    TRIG 103 01/06/2023    TRIG 236 (H) 07/21/2021    TRIG 129 11/18/2020     Lab Results   Component Value Date    [facility] 217 12/04/2018    [facility] 216 (H) 12/16/2017     Lab Results   Component Value Date    LDLCALC 278 (H) 01/06/2023    LDLCALC 191 (H) 07/21/2021     Lab Results   Component Value Date    ADIWKQEL51 799 06/03/2022     Lab Results   Component Value Date    SLX3CETLPPEK 1 985 06/08/2021    TSH 2 06 06/03/2022     Lab Results   Component Value Date    RPR Non-Reactive 12/04/2018     Lab Results   Component Value Date    IOLJ99BFGITM 54 06/03/2022    QKOU55YBAFUY 31 6 12/04/2018      Results for orders placed during the hospital encounter of 06/08/21    CT head without contrast    Narrative  CT BRAIN - WITHOUT CONTRAST    INDICATION:   syncope    "tow episodes of syncope today once she caught herself once with loc for about 1 min at [employer] lowered to the floor did not hit head (pt states she has been getting light headed since her hip replacement but she usually can  catch herself)"    COMPARISON:  None  TECHNIQUE:  CT examination of the brain was performed  In addition to axial images, sagittal and coronal 2D reformatted images were created and submitted for interpretation  Radiation dose length product (DLP) for this visit:  825 mGy-cm   This examination, like all CT scans performed in the [facility], was performed utilizing techniques to minimize radiation dose exposure, including the use of iterative  reconstruction and automated exposure control  IMAGE QUALITY:  Diagnostic  FINDINGS:    PARENCHYMA:  Unchanged right parietal encephalomalacia  VENTRICLES AND EXTRA-AXIAL SPACES:  Normal for the patient's age  VISUALIZED ORBITS AND PARANASAL SINUSES:  Unremarkable  CALVARIUM AND EXTRACRANIAL SOFT TISSUES:  Normal     Impression  No acute intracranial abnormality  Workstation performed: [id number]    No results found for this or any previous visit  No results found for this or any previous visit  Results for orders placed during the hospital encounter of 06/23/22    MRI brain wo contrast    Narrative  MRI BRAIN WITHOUT CONTRAST    INDICATION: G45 4: Transient global amnesia  G93 89: Other specified disorders of brain  COMPARISON:   MRI dated 7/8/2021  TECHNIQUE:  Sagittal T1, axial T2, axial FLAIR, axial T1, axial Gradient and axial diffusion imaging  IMAGE QUALITY:  Diagnostic  FINDINGS:    BRAIN PARENCHYMA:  There is no discrete mass, mass effect or midline shift  There is no intracranial hemorrhage  There is no evidence of acute infarction and diffusion imaging is unremarkable   Stable remote posterior right MCA distribution infarct with  encephalomalacia and gliosis in the right parietal lobe  Associated laminar necrosis  Chronic lacunar infarct in the left cerebellum  T2/FLAIR hyperintensity in the periventricular and subcortical white matter likely represents mild chronic  microangiopathy  No restricted diffusion to indicate acute infarct  No acute hemorrhage, mass, mass effect, shift or herniation  VENTRICLES:   Mild ex vacuo dilatation of the atria of the right lateral ventricle  Within normal limits for patient age  SELLA AND PITUITARY GLAND:  Normal     ORBITS:  Normal     PARANASAL SINUSES:  Normal     VASCULATURE:  Evaluation of the major intracranial vasculature demonstrates appropriate flow voids  CALVARIUM AND SKULL BASE: Stable opacification of inferior right mastoid air cells  Calvarium otherwise unremarkable  EXTRACRANIAL SOFT TISSUES:  Normal     Impression  No acute intracranial pathology  Stable chronic right MCA infarct  Mild chronic microangiopathy  Workstation performed: [id number]      I have spent 60 minutes with patient and friend today including taking history from family, patient, review of records, charting, and counseling regarding diagnosis and management of conditions

## 2023-01-17 NOTE — ASSESSMENT & PLAN NOTE
Diagnosis is difficult to ascertain considering lack of supportive historian  Patient does have abnormal MoCA score 21/30 and likely best described as mild cognitive impairment (MCI)  There are multiple possible etiologies of MCI  Patient does have a history of CVA although unclear as to whether this is a cause of current symptoms or not  As well patient has clear significant severe depression symptoms which may be contributing to her cognitive impairment  As well patient continues with lorazepam twice a day every day  Chronic benzodiazepine use has been associated with increased risks of development of dementia and may impair cognitive functioning directly  For cognitive health I would recommend stopping standing lorazepam   Standing benzodiazepine therapy is not recommended for depression or anxiety  Does not seem that venlafaxine has been particularly effective  I would recommend consideration of either altering SNRI therapy to another SSRI/SNRI agent or consideration of switching from lorazepam to another more appropriate adjunctive agent such as buspirone, aripiprazole or bupropion  Decision-making capacity: Appears intact, but she may have impairment for complex medical or financial decisions considering deficits in MoCA testing today  Would recommend neuropsychology evaluation for further determination  Staging: Mild Cognitive Impairment (MCI)    Medications Review: Reviewed medications  Current prescribed medications with risks due to chronic benzodiazepine use  Recommend tapering off altogether and consider alternative therapy  As well patient has numerous supplements  Would recommend decreasing pill burden and likely unnecessary supplements

## 2023-01-23 DIAGNOSIS — F33.2 SEVERE EPISODE OF RECURRENT MAJOR DEPRESSIVE DISORDER, WITHOUT PSYCHOTIC FEATURES (HCC): Chronic | ICD-10-CM

## 2023-01-24 RX ORDER — LORAZEPAM 1 MG/1
1 TABLET ORAL 2 TIMES DAILY
Qty: 60 TABLET | Refills: 0 | Status: SHIPPED | OUTPATIENT
Start: 2023-01-24

## 2023-01-25 ENCOUNTER — TELEPHONE (OUTPATIENT)
Dept: NEUROLOGY | Facility: CLINIC | Age: 73
End: 2023-01-25

## 2023-01-25 NOTE — TELEPHONE ENCOUNTER
Fax received from [facility] requesting medical records  Scanned into media and sent to [facility]

## 2023-01-26 ENCOUNTER — OFFICE VISIT (OUTPATIENT)
Dept: VASCULAR SURGERY | Facility: CLINIC | Age: 73
End: 2023-01-26

## 2023-01-26 VITALS
WEIGHT: 129.2 LBS | BODY MASS INDEX: 22.06 KG/M2 | DIASTOLIC BLOOD PRESSURE: 74 MMHG | HEART RATE: 93 BPM | OXYGEN SATURATION: 94 % | SYSTOLIC BLOOD PRESSURE: 98 MMHG | HEIGHT: 64 IN

## 2023-01-26 DIAGNOSIS — I65.22 ASYMPTOMATIC STENOSIS OF LEFT CAROTID ARTERY: Primary | ICD-10-CM

## 2023-01-26 DIAGNOSIS — I72.0 ANEURYSM OF RIGHT CAROTID ARTERY (HCC): ICD-10-CM

## 2023-01-26 DIAGNOSIS — I65.23 CAROTID STENOSIS, ASYMPTOMATIC, BILATERAL: ICD-10-CM

## 2023-01-26 DIAGNOSIS — I69.30 CHRONIC ISCHEMIC RIGHT MIDDLE CEREBRAL ARTERY (MCA) STROKE: Chronic | ICD-10-CM

## 2023-01-26 PROBLEM — Z86.73 CHRONIC ISCHEMIC RIGHT MIDDLE CEREBRAL ARTERY (MCA) STROKE: Chronic | Status: ACTIVE | Noted: 2023-01-01

## 2023-01-26 NOTE — PROGRESS NOTES
Assessment/Plan:    Asymptomatic stenosis of left carotid artery  Asymptomatic moderate, approximately 50%, left carotid artery stenosis incidentally identified on recent work-up for cognitive impairment/amnestic episodes  We discussed the pathophysiology of carotid occlusive disease and the indications for further evaluation and treatment  At this point no further intervention is necessary  We did discuss the importance of medical management and the role of antiplatelet and statin therapy  Evidently she has been hesitant to utilize medical therapies and would like to discuss statin therapy further with her primary care physician  She has started a daily aspirin  We will plan standard duplex follow-up which will be scheduled in 6 months  Chronic ischemic right middle cerebral artery (MCA) stroke  Chronic right hemispheric stroke in the MCA distribution  This was incidentally identified on recent work-up for cognitive impairment  Patient denies any history of stroke  Of note there is no significant carotid disease on the ipsilateral right side  Diagnoses and all orders for this visit:    Asymptomatic stenosis of left carotid artery  -     VAS carotid complete study; Future    Carotid stenosis, asymptomatic, bilateral  -     Ambulatory Referral to Vascular Surgery    Aneurysm of right carotid artery [facility])  -     Ambulatory Referral to Vascular Surgery    Chronic ischemic right middle cerebral artery (MCA) stroke          Subjective:      Patient ID: Saeed Banks is a 67 y o  female  Patient is new to our office  She was referred here by Dr Lori Guillory  Patient is here to review results of CTA head and neck done 1/14/2023  She c/o frequent headaches and some trouble swallowing  She denies any dizziness, sudden loss of vision, slurred speech, TIA or Stroke symptoms  Patient is taking ASA 81 mg  Patient is a former smoker       70-year-old presents in follow-up of a recent CT angiogram  Evidently she has recently been undergoing evaluation for "cognitive decline "  She describes episodes in which she had amnesia over short periods of time  She denies any focal neurologic symptoms which would be consistent with TIA, CVA or amaurosis fugax  Evidently she remains relatively active with no limitations  She denies any chest pain, shortness of breath or claudication symptoms  When discussing medical options she stated that she is resistant to any medical therapies but takes numerous supplements  CT angiogram 2023 with atherosclerotic disease of the mid left internal carotid artery creating an approximate 50% stenosis  On the right there is mild atherosclerotic disease without significant stenosis  There is evidence of a right MCA chronic stroke        The following portions of the patient's history were reviewed and updated as appropriate: allergies, current medications, past family history, past medical history, past social history, past surgical history and problem list     Past Medical History:  Past Medical History:   Diagnosis Date   • [facility]   • Mild cognitive impairment        Past Surgical History:  Past Surgical History:   Procedure Laterality Date   • CYSTOSCOPY      Diagnostic   • GASTROJEJUNOSTOMY      Percutaneous   • LAPAROSCOPIC APPENDECTOMY     • OTHER SURGICAL HISTORY      1)Biopsy oropharynx tonsillar fossa; 2)Chemotherapy Administration with chemoport for tonsillar cancer   • TONSILLECTOMY      Cancer at age 54   • VENA CAVA RECONSTRUCTION      Inferior vena cava       Social History:  Social History     Substance and Sexual Activity   Alcohol Use No     Social History     Substance and Sexual Activity   Drug Use No     Social History     Tobacco Use   Smoking Status Former   • Types: Cigarettes   • Quit date:    • Years since quittin 0   Smokeless Tobacco Never       Family History:  Family History Problem Relation Age of Onset   • Breast cancer Mother         unspecified laterality       • Bone cancer Family    • No Known Problems Sister    • No Known Problems Maternal Grandmother    • No Known Problems Paternal Grandmother    • No Known Problems Sister    • No Known Problems Maternal Aunt    • No Known Problems Paternal Aunt    • No Known Problems Paternal Aunt    • Melanoma Father    • Cancer Maternal Grandfather         unknown where   • No Known Problems Paternal Grandfather    • Bone cancer Other 28       Allergies:   Allergies   Allergen Reactions   • Diacetylmorphine      paranoid   • Morphine Other (See Comments)     "confusion & paranoid behavior"   • Pineapple - Food Allergy Tongue Swelling     Tongue bleeding       Medications:    Current Outpatient Medications:   •  aspirin (ECOTRIN LOW STRENGTH) 81 mg EC tablet, Take 1 tablet (81 mg total) by mouth daily, Disp: 90 tablet, Rfl: 3  •  Cholecalciferol 50 MCG (2000 UT) CAPS, Take 1 capsule by mouth, Disp: , Rfl:   •  LORazepam (ATIVAN) 1 mg tablet, Take 1 tablet (1 mg total) by mouth 2 (two) times a day, Disp: 60 tablet, Rfl: 0  •  Magnesium 250 MG TABS, Take 500 mg by mouth 2 (two) times a day, Disp: , Rfl:   •  venlafaxine (EFFEXOR-XR) 37 5 mg 24 hr capsule, 2 tablets daily (75 mg total), Disp: 30 capsule, Rfl: 5  •  amoxicillin (AMOXIL) 500 mg capsule, TAKE FOUR CAPSULES BY MOUTH 1 HOUR PRIOR TO PROCEDURE (Patient not taking: Reported on 1/26/2023), Disp: , Rfl:     Vitals:  BP 98/74 (01/26/23 0945)    Temp      Pulse 93 (01/26/23 0940)   Resp      SpO2 94 % (01/26/23 0940)      Lab Results and Cultures:   CBC with diff:   Lab Results   Component Value Date    WBC 5 15 06/09/2021    HGB 9 9 (L) 06/09/2021    HCT 31 5 (L) 06/09/2021    MCV 95 06/09/2021     06/09/2021    MCH 29 8 06/09/2021    MCHC 31 4 06/09/2021    RDW 12 9 06/09/2021    MPV 8 7 (L) 06/09/2021    NRBC 0 06/09/2021   ,   BMP/CMP:  Lab Results   Component Value Date    NA 141 12/16/2017    K 3 9 01/06/2023     01/06/2023    CO2 33 (H) 01/06/2023    ANIONGAP 8 04/10/2015    BUN 18 01/06/2023    CREATININE 1 30 (H) 01/06/2023    CREATININE 1 18 06/09/2021    CREATININE 1 13 (H) 12/16/2017    GLUCOSE 120 04/10/2015    CALCIUM 9 9 01/06/2023    AST 21 06/03/2022    ALT 13 06/03/2022    ALKPHOS 66 06/03/2022    PROT 7 0 12/16/2017    BILITOT 0 4 12/16/2017    EGFR 44 (L) 01/06/2023    EGFR 47 06/09/2021   ,   Lipid Panel:   Lab Results   Component Value Date    CHOL 292 (H) 12/16/2017   ,   Coags:   Lab Results   Component Value Date    PTT 51 (H) 10/16/2014    INR 0 96 10/15/2014   ,       Review of Systems   Constitutional: Positive for fatigue  HENT: Positive for rhinorrhea and trouble swallowing  Eyes: Negative  Respiratory: Positive for cough  Cardiovascular: Negative  Gastrointestinal: Positive for abdominal pain  Endocrine: Negative  Genitourinary: Negative  Musculoskeletal: Positive for arthralgias  Skin: Negative  Allergic/Immunologic: Positive for environmental allergies and food allergies (pineapple)  Neurological: Positive for headaches  Hematological: Bruises/bleeds easily  Psychiatric/Behavioral: Negative  Objective:      BP 98/74 (BP Location: Right arm, Patient Position: Sitting, Cuff Size: Standard)   Pulse 93   Ht 5' 4" (1 626 m)   Wt 58 6 kg (129 lb 3 2 oz)   SpO2 94%   BMI 22 18 kg/m²          Physical Exam  Constitutional:       Appearance: She is well-developed  HENT:      Head: Normocephalic and atraumatic  Eyes:      Pupils: Pupils are equal, round, and reactive to light  Neck:      Vascular: No carotid bruit or JVD  Cardiovascular:      Rate and Rhythm: Normal rate and regular rhythm  Pulses:           Carotid pulses are 2+ on the right side and 2+ on the left side  Radial pulses are 2+ on the right side and 2+ on the left side  Heart sounds: No murmur heard    Pulmonary:      Effort: Pulmonary effort is normal  No respiratory distress  Breath sounds: Normal breath sounds  Musculoskeletal:         General: No tenderness  Normal range of motion  Cervical back: Normal range of motion and neck supple  Skin:     General: Skin is warm and dry  Neurological:      General: No focal deficit present  Mental Status: She is alert and oriented to person, place, and time  Sensory: No sensory deficit  Motor: No weakness        Gait: Gait normal    Psychiatric:         Mood and Affect: Mood normal

## 2023-01-26 NOTE — PATIENT INSTRUCTIONS
Asymptomatic stenosis of left carotid artery  Asymptomatic moderate, approximately 50%, left carotid artery stenosis incidentally identified on recent work-up for cognitive impairment/amnestic episodes  We discussed the pathophysiology of carotid occlusive disease and the indications for further evaluation and treatment  At this point no further intervention is necessary  We did discuss the importance of medical management and the role of antiplatelet and statin therapy  Evidently she has been hesitant to utilize medical therapies and would like to discuss statin therapy further with her primary care physician  She has started a daily aspirin  We will plan standard duplex follow-up which will be scheduled in 6 months  Chronic ischemic right middle cerebral artery (MCA) stroke  Chronic right hemispheric stroke in the MCA distribution  This was incidentally identified on recent work-up for cognitive impairment  Patient denies any history of stroke  Of note there is no significant carotid disease on the ipsilateral right side

## 2023-01-26 NOTE — ASSESSMENT & PLAN NOTE
Asymptomatic moderate, approximately 50%, left carotid artery stenosis incidentally identified on recent work-up for cognitive impairment/amnestic episodes  We discussed the pathophysiology of carotid occlusive disease and the indications for further evaluation and treatment  At this point no further intervention is necessary  We did discuss the importance of medical management and the role of antiplatelet and statin therapy  Evidently she has been hesitant to utilize medical therapies and would like to discuss statin therapy further with her primary care physician  She has started a daily aspirin  We will plan standard duplex follow-up which will be scheduled in 6 months

## 2023-01-26 NOTE — LETTER
January 26, 8809     Vivaty, DO  [address] [facility]  [address]    Patient: Melani Billing   YOB: 1950   Date of Visit: 1/26/2023       Dear Dr Carolina Martinez: Thank you for referring Chantal Domínguez to me for evaluation  Below are the relevant portions of my assessment and plan of care  Asymptomatic stenosis of left carotid artery  Asymptomatic moderate, approximately 50%, left carotid artery stenosis incidentally identified on recent work-up for cognitive impairment/amnestic episodes  We discussed the pathophysiology of carotid occlusive disease and the indications for further evaluation and treatment  At this point no further intervention is necessary  We did discuss the importance of medical management and the role of antiplatelet and statin therapy  Evidently she has been hesitant to utilize medical therapies and would like to discuss statin therapy further with her primary care physician  She has started a daily aspirin  We will plan standard duplex follow-up which will be scheduled in 6 months  Chronic ischemic right middle cerebral artery (MCA) stroke  Chronic right hemispheric stroke in the MCA distribution  This was incidentally identified on recent work-up for cognitive impairment  Patient denies any history of stroke  Of note there is no significant carotid disease on the ipsilateral right side  If you have questions, please do not hesitate to call me  I look forward to following Lexis Randolph along with you           Sincerely,        Sourav Dickson MD        CC: No Recipients

## 2023-01-26 NOTE — ASSESSMENT & PLAN NOTE
Chronic right hemispheric stroke in the MCA distribution  This was incidentally identified on recent work-up for cognitive impairment  Patient denies any history of stroke  Of note there is no significant carotid disease on the ipsilateral right side

## 2023-02-01 ENCOUNTER — HOSPITAL ENCOUNTER (OUTPATIENT)
Dept: NEUROLOGY | Facility: CLINIC | Age: 73
Discharge: HOME | End: 2023-02-01

## 2023-02-01 DIAGNOSIS — R40.4 ALTERED AWARENESS, TRANSIENT: ICD-10-CM

## 2023-02-02 ENCOUNTER — PATIENT MESSAGE (OUTPATIENT)
Dept: NEUROLOGY | Facility: CLINIC | Age: 73
End: 2023-02-02

## 2023-02-02 NOTE — PATIENT COMMUNICATION
Oh yes, this is Shorty Bahraini  My date of birth is October, 13th, 1950  I'm a patient of Eboni Fitch  I have emailed to her on [employer] earlier today  I wanted her to know the EEG scheduled for me the 22nd  The results are on [facility] and Dr Danny Singleton I am really concerned about the results of this  Again, I did email to you on [employer]  I wanted you to know that the results were out there  I reached out to you in concern for a few things and I am eager to hear your response  Now, I wont be next to my phone, but I will look for your response on GoP[employer] later on the same thing as I'm going out for a doctor appointment  Thank you very much and you have a great day  Thank you  Had a great day  Cb [phone]    Called pt and states that she wanted to let Dr Danny Singleton know that her EEG results are in  She wanted Dr Danny Singleton to review it  States that she is not going to be on her phone  It would be best for Dr Danny Singleton to response to her [employer]

## 2023-02-07 ENCOUNTER — TELEPHONE (OUTPATIENT)
Dept: NEUROLOGY | Facility: CLINIC | Age: 73
End: 2023-02-07

## 2023-02-07 NOTE — TELEPHONE ENCOUNTER
Her EEG shows dysfunction in the area of her brain affected by her previous stroke  This is expected with her previous known infarct  It shows no evidence of any activity concerning for seizures  Overall results are not concerning  No further testing needed at this time

## 2023-02-07 NOTE — PATIENT COMMUNICATION
Pt left another message re: her EEG results  She would like an explanation of the abnormal results and states that she is very concerned and scared  She reports is somewhat disappointed that she has not heard from the doctor yet  Called back and spoke with pt  She is requesting a detailed written response of the EEG results on [email or web address] so that she has something to read or refer too, as she says a phone conversation will be in and out of her head in moments  Pt states that she is also open to additional testing if necessary and/or an earlier office visit

## 2023-02-07 NOTE — TELEPHONE ENCOUNTER
Received VM transcription:    Yes, my name is Tejal Bassett  I am in the middle of trying to get some dental work done  It's going to require me to have 2 teeth pulled  I am a cancer survivor and my oncologist requires me to get all hyperbaric oxygen before I have 2 teeth pulled  So I don't all have to worry about going into osteo necrosis  That's a big word for a little girl like me  But I would also like Dr Tone Kay to address because of this abnormal EEG and her response  How does she feel about me having a 10 or 15 hyperbaric sessions to have teeth pulled  If she needs phone numbers, if she wants to talk to my oncologist or my dentist or my oral surgeon or whomever, I can provide her with that information  I won't complicate this message [phone]  Thank you, MIS collier   --------------------------------------------------------    Dr Tone Kay - Please advise

## 2023-02-08 DIAGNOSIS — F33.2 SEVERE EPISODE OF RECURRENT MAJOR DEPRESSIVE DISORDER, WITHOUT PSYCHOTIC FEATURES (HCC): Chronic | ICD-10-CM

## 2023-02-08 NOTE — PATIENT COMMUNICATION
February 7, 2023  Ángela Paul MD  to Me    KP    2:38 PM   Her EEG shows dysfunction in the area of her brain affected by her previous stroke  This is expected with her previous known infarct  It shows no evidence of any activity concerning for seizures  Overall results are not concerning  No further testing  needed at this time  5

## 2023-02-09 RX ORDER — VENLAFAXINE HYDROCHLORIDE 37.5 MG/1
CAPSULE, EXTENDED RELEASE ORAL
Qty: 60 CAPSULE | Refills: 5 | Status: SHIPPED | OUTPATIENT
Start: 2023-02-09

## 2023-02-13 ENCOUNTER — PATIENT MESSAGE (OUTPATIENT)
Dept: NEUROLOGY | Facility: CLINIC | Age: 73
End: 2023-02-13

## 2023-02-13 NOTE — TELEPHONE ENCOUNTER
Pls see below about pt's question about abnormal EEG   How does she feel about me having a 10 or 15 hyperbaric sessions to have teeth pulled  Is pt also cleared for this sessions?

## 2023-02-13 NOTE — TELEPHONE ENCOUNTER
Called and advised pt of the below  She verbalized clear understanding  Also, states that she was told that her EEG was abnormal  She takes about 15 different vitamins  Current meds  Mag 250 mg daily  Vit C 1 tab daily  Multivitamin 1 tab daily  Vit D3 2000 daily   Selenium 200 mg daily   She wants to know if there is any supplements that you can recommend to help her brain heal      Pt states that she will send list of all the vitamins she takes via Blinkbuggy      Also, see Blink[employer] message dated 2/8/23

## 2023-02-14 NOTE — TELEPHONE ENCOUNTER
No additional supplements which I would recommend at this time  She should continue ASA daily for secondary stroke prevention

## 2023-02-21 ENCOUNTER — RA CDI HCC (OUTPATIENT)
Dept: OTHER | Facility: HOSPITAL | Age: 73
End: 2023-02-21

## 2023-02-21 NOTE — PROGRESS NOTES
[facility]  coding opportunities       Chart reviewed, no opportunity found: CHART REVIEWED, NO OPPORTUNITY FOUND        Patients Insurance     Medicare Insurance: [employer] [facility] [employer]

## 2023-02-27 NOTE — TELEPHONE ENCOUNTER
Pt made aware via [employer] chest wall non-tender, breathing is unlabored without accessory muscle use, normal breath sounds

## 2023-03-01 ENCOUNTER — TELEPHONE (OUTPATIENT)
Dept: FAMILY MEDICINE CLINIC | Facility: CLINIC | Age: 73
End: 2023-03-01

## 2023-03-24 ENCOUNTER — TELEPHONE (OUTPATIENT)
Dept: NEUROLOGY | Facility: CLINIC | Age: 73
End: 2023-03-24

## 2023-03-24 NOTE — TELEPHONE ENCOUNTER
Received fax in [facility] asking for medical records  Scanned request into chart and faxed to medical records for completion

## 2023-11-29 ENCOUNTER — TELEPHONE (OUTPATIENT)
Dept: NEUROLOGY | Facility: CLINIC | Age: 73
End: 2023-11-29

## 2023-11-29 NOTE — TELEPHONE ENCOUNTER
Recd  11/28 1:25 PM      this is Tresea Gordon calling. I got a message to call you to get some information about how to pull records for my sister who passed away. Jovon Oakley, date of birth, was October, 13th, 1950. I'm on the run constantly. So hopefully you'll be able to get back to me very, very soon.  Okay, or at least  leave a message  and tell me what I need to do.    [phone]

## 2024-12-09 NOTE — PROGRESS NOTES
[facility]   Physical Therapy Treatment Note      Name: Zoë [facility] Number: [id number]    Therapy Diagnosis:   Encounter Diagnoses   Name Primary?    Right hip pain Yes    Weakness      Physician: Alba Torres PA-C    Visit Date: 12/9/2024    Physician Orders: PT Eval and Treat  Medical Diagnosis from Referral:   M54.16 (ICD-10-CM) - Lumbar radiculopathy   M16.11 (ICD-10-CM) - Primary osteoarthritis of right hip      Evaluation Date: 12/4/2024  Authorization Period Expiration: 12/31/2024  Plan of Care Expiration: 3/4/2025  Progress Note Due: 1/4/2024  Visit # / Visits authorized: 1/1, 1/20   FOTO: 1/ 3     Precautions: Standard and hx of CA      Time In: 10:01 am  Time Out: 10:44 am  Total Billable Time: 43 minutes    PTA Visit #: 0/5       Subjective     [facility] reports[facility] so she is sore. .  She was compliant with home exercise program.  Response to previous treatment: no adverse effects  Functional change: none reported    Pain: 2/10  Location: right hip      Objective      Objective Measures updated at progress report unless specified.     Treatment     Zoë received the treatments listed below:      therapeutic exercises to develop strength, ROM, flexibility, and core stabilization for 10 minutes including:  Triple flexion red theraband x10  Seated ball roll outs 3 directions x10 each way      neuromuscular re-education activities to improve: Balance, Coordination, Proprioception, and Posture for 21 minutes. The following activities were included:  Hip abd iso 1x1' B  SL clam 1x1' B  TrA activations 3 direction with SB x10, 5s  Supine pilates ring abd 3x10, 3s  Standing march with 2# x15 B      therapeutic activities to improve functional performance for 12  minutes, including:  Pallof 2x10 B GTB  SAPD with TrA 3x10  Standing hip abd/ext 2x10 B      Patient Education and Home Exercises       Education provided:   - HEP    Written Home  PT Evaluation     Today's date: 2021  Patient name: Sarai Massey  : 1950  MRN: [id number]  Referring provider: JOYCE Servin  Dx:   Encounter Diagnosis     ICD-10-CM    1  Hx of total hip arthroplasty, left  Z96 [id number]    2  Status post total hip replacement, right  Z96 [id number]                   Assessment  Assessment details: Pt is a 78 yo female s/o a L SCOUT on  and R SCOUT Aug 25  She was in a transitional care unit until  or  and then was discharge to her home where she lives along in a multistory home  Pt did not continue her L LE program due to pain in the right hip with exercises  She notes that she is now able to care for herself but will need some help to care for her house and yard  She is currently ambulating with a rolling walker but can walk short distances without it  She presents with weakness consistent with her diagnosis and would benefit from physical therapy to enable her to resume walking for fitness and light to moderate house and yard work to enable her to maintain independence  Impairments: abnormal gait, abnormal or restricted ROM, activity intolerance, lacks appropriate home exercise program and weight-bearing intolerance    Symptom irritability: moderateUnderstanding of Dx/Px/POC: excellent  Goals  STG: in 4 weeks  Amb without assistive device in home  Able to prepare meals without difficulty  LTG: by discharge  Resume walking for fitness  Independent with HEP  Amb without assistive device  Plan  Patient would benefit from: skilled physical therapy  Planned therapy interventions: manual therapy, neuromuscular re-education, patient education, strengthening, stretching, therapeutic exercise and home exercise program  Frequency: 2x week  Duration in weeks: 10  Treatment plan discussed with: patient        Subjective Evaluation    History of Present Illness  Mechanism of injury: L hip  and R hip Aug 25    She was in a transitional care unit Exercises Provided: Yes. Exercises were reviewed and Zoë was able to demonstrate them prior to the end of the session.  Zoë demonstrated good  understanding of the education provided. See Electronic Medical Record under Patient Instructions for exercises provided during therapy sessions    Assessment     Pt tolerated first session after initial evaluation well today with no adverse effects. Session with emphasis on hip and core strengthening.       Zoë Is progressing well towards her goals.   Patient prognosis is Good.   Patient will benefit from skilled outpatient Physical Therapy to address the deficits stated above and in the chart below, provide patient /family education, and to maximize patientt's level of independence.      Plan of care discussed with patient: Yes  Patient's spiritual, cultural and educational needs considered and patient is agreeable to the plan of care and goals as stated below:      Anticipated Barriers for therapy: pain, co-morbidities    Goals:   Short Term Goals (4 Weeks):  1. Pt will be compliant with HEP to supplement PT in decreasing pain with functional mobility.  2. Pt will perform pallof press with good control to demonstrate improved core strength.  3. Pt will improve lumbar ext ROM by 5 degrees to improve functional mobility.  4. Pt will improve impaired LE MMTs by 1/2 score to improve strength for functional tasks.  Long Term Goals (8 Weeks):   1. Pt will improve FOTO score to </= tbd% limited to decrease perceived limitation with maintaining/changing body position.   2. Pt will perform floor to waist lift with good control to demonstrate improved core strength.  3. Pt will improve impaired LE MMTs by 1 score to improve strength for functional tasks.  4. Pt will report no pain during lumbar ROM to promote functional mobility.        Plan     Plan of care Certification: 12/4/2024 to 3/4/2025.     Outpatient Physical Therapy 2 times weekly for 12 weeks to include the  until Sept 6 or 7     R hip gets really numb and achy when she sits  Pain  Current pain ratin  At worst pain ratin  Location: right hip  Aggravating factors: sitting  Progression: improved    Social Support  Stairs in house: yes   Lives in: multiple-level home  Lives with: alone    Patient Goals  Patient goal: yard work, walk in the morning, chair yoga        Objective     Observations     Additional Observation Details  Incision appears to be healing well with no redness or significant swelling        Active Range of Motion   Left Hip   Flexion: 90 degrees   Abduction: 25 degrees     Passive Range of Motion   Left Hip   Flexion: 110 degrees   Abduction: 40 degrees   External rotation (90/90): 35 degrees   Internal rotation (90/90): 35 degrees     Right Hip   Flexion: 90 degrees   Abduction: 30 degrees   External rotation (90/90): 35 degrees with pain    Strength/Myotome Testing     Left Hip   Planes of Motion   Flexion: 4  Abduction: 4  External rotation: 4  Internal rotation: 4    Right Hip   Planes of Motion   Flexion: 3+  Extension: 3+  Abduction: 3  Adduction: 3+  External rotation: 4-  Internal rotation: 4-    Precautions: tonsil cancer at age 54     Daily Treatment Diary      Assessment                       Eval/Reval                       FOTO         **         **   Manuals                                                     Exercise Diary                             standing hip abd, ext x10 ea                      marching x10                      HR/TR                        gastroc stretch                       Step ups                        romberg on foam                                                                        LAQ x10                     bridges x10                      SLR x10                                                                                                                     Modalities following interventions: Cervical/Lumbar Traction, Electrical Stimulation, Gait Training, Manual Therapy, Moist Heat/ Ice, Neuromuscular Re-ed, Therapeutic Activities, Therapeutic Exercise, Ultrasound, and dry needling, IASTM, and kinesiotaping PRN.     Singh Arcos, PT, DPT